# Patient Record
Sex: FEMALE | Race: WHITE | NOT HISPANIC OR LATINO | Employment: FULL TIME | RURAL
[De-identification: names, ages, dates, MRNs, and addresses within clinical notes are randomized per-mention and may not be internally consistent; named-entity substitution may affect disease eponyms.]

---

## 2023-08-24 ENCOUNTER — HOSPITAL ENCOUNTER (EMERGENCY)
Facility: HOSPITAL | Age: 33
Discharge: HOME | End: 2023-08-24
Payer: MEDICAID

## 2023-08-24 VITALS
BODY MASS INDEX: 40.01 KG/M2 | DIASTOLIC BLOOD PRESSURE: 93 MMHG | SYSTOLIC BLOOD PRESSURE: 128 MMHG | TEMPERATURE: 98 F | HEART RATE: 85 BPM | WEIGHT: 225.81 LBS | RESPIRATION RATE: 20 BRPM | HEIGHT: 63 IN | OXYGEN SATURATION: 97 %

## 2023-08-24 DIAGNOSIS — K59.00 CONSTIPATION, UNSPECIFIED CONSTIPATION TYPE: ICD-10-CM

## 2023-08-24 DIAGNOSIS — J02.9 PHARYNGITIS, UNSPECIFIED ETIOLOGY: ICD-10-CM

## 2023-08-24 DIAGNOSIS — B34.9 VIRAL SYNDROME: ICD-10-CM

## 2023-08-24 DIAGNOSIS — G44.209 ACUTE NON INTRACTABLE TENSION-TYPE HEADACHE: Primary | ICD-10-CM

## 2023-08-24 LAB
BILIRUB UR QL STRIP: NEGATIVE
CLARITY UR: CLEAR
COLOR UR: YELLOW
FLUAV AG UPPER RESP QL IA.RAPID: NEGATIVE
FLUBV AG UPPER RESP QL IA.RAPID: NEGATIVE
GLUCOSE UR STRIP-MCNC: NEGATIVE MG/DL
HCG UR QL IA.RAPID: NEGATIVE
KETONES UR STRIP-SCNC: NORMAL MG/DL
LEUKOCYTE ESTERASE UR QL STRIP: NEGATIVE
NITRITE UR QL STRIP: NEGATIVE
PH UR STRIP: 5.5 PH UNITS
PROT UR QL STRIP: NEGATIVE
RBC # UR STRIP: NEGATIVE /UL
SARS-COV-2 RDRP RESP QL NAA+PROBE: NEGATIVE
SP GR UR STRIP: 1.02
UROBILINOGEN UR STRIP-ACNC: 0.2 MG/DL

## 2023-08-24 PROCEDURE — 99284 EMERGENCY DEPT VISIT MOD MDM: CPT

## 2023-08-24 PROCEDURE — 99284 PR EMERGENCY DEPT VISIT,LEVEL IV: ICD-10-PCS | Mod: ,,, | Performed by: NURSE PRACTITIONER

## 2023-08-24 PROCEDURE — 63600175 PHARM REV CODE 636 W HCPCS: Performed by: NURSE PRACTITIONER

## 2023-08-24 PROCEDURE — 96372 THER/PROPH/DIAG INJ SC/IM: CPT | Performed by: NURSE PRACTITIONER

## 2023-08-24 PROCEDURE — 99284 EMERGENCY DEPT VISIT MOD MDM: CPT | Mod: ,,, | Performed by: NURSE PRACTITIONER

## 2023-08-24 PROCEDURE — 87804 INFLUENZA ASSAY W/OPTIC: CPT | Performed by: NURSE PRACTITIONER

## 2023-08-24 PROCEDURE — 87635 SARS-COV-2 COVID-19 AMP PRB: CPT | Performed by: NURSE PRACTITIONER

## 2023-08-24 PROCEDURE — 81003 URINALYSIS AUTO W/O SCOPE: CPT | Performed by: NURSE PRACTITIONER

## 2023-08-24 PROCEDURE — 81025 URINE PREGNANCY TEST: CPT | Performed by: NURSE PRACTITIONER

## 2023-08-24 RX ORDER — BUSPIRONE HYDROCHLORIDE 5 MG/1
5 TABLET ORAL 2 TIMES DAILY
COMMUNITY
End: 2023-10-01 | Stop reason: SDDI

## 2023-08-24 RX ORDER — FLUOXETINE HYDROCHLORIDE 40 MG/1
40 CAPSULE ORAL DAILY
COMMUNITY
End: 2023-10-01 | Stop reason: SDDI

## 2023-08-24 RX ORDER — KETOROLAC TROMETHAMINE 30 MG/ML
15 INJECTION, SOLUTION INTRAMUSCULAR; INTRAVENOUS
Status: COMPLETED | OUTPATIENT
Start: 2023-08-24 | End: 2023-08-24

## 2023-08-24 RX ADMIN — KETOROLAC TROMETHAMINE 15 MG: 30 INJECTION INTRAMUSCULAR; INTRAVENOUS at 09:08

## 2023-08-24 NOTE — DISCHARGE INSTRUCTIONS
Drink more water.  May take miralax or metemucil for constipation.  May use salt water gargles for sore throat.  Take tylenol or ibuprofen as directed if needed for headache.  Return for worsening condition or emergency needs.

## 2023-08-24 NOTE — Clinical Note
"Dalia Claudiomarcellus Collado was seen and treated in our emergency department on 8/24/2023.  She may return to work on 08/25/2023.       If you have any questions or concerns, please don't hesitate to call.      Nan Duarte, FNP"

## 2023-08-24 NOTE — ED TRIAGE NOTES
Patient presents to ED via POV. States she has just been feeling dizzy and weak for the last few days. She is also experiencing an upset stomach and headache. States that today it was just worsening and she didn't feel like she could properly work. VSS.

## 2023-08-24 NOTE — ED PROVIDER NOTES
Encounter Date: 8/24/2023       History     Chief Complaint   Patient presents with    Dizziness    Weakness    Headache     Ms Collado is a pleasant 33 yr old with no significant PMH to ED with c/o headache, sore throat, fever, generalized aching and weakness, dizziness for the past few days.  Reports her friend tested positive for Covid yesterday. Pt also reports constipation at times.  States had BM yesterday but prior to that, it had been a week.      The history is provided by the patient.   Headache   This is a new problem. The current episode started yesterday. The problem has been waxing and waning. The pain is located in the Bilateral region. The quality of the pain is described as aching. Associated symptoms include dizziness, a fever, a sore throat and weakness. Nothing aggravates the symptoms.     Review of patient's allergies indicates:  No Known Allergies  History reviewed. No pertinent past medical history.  History reviewed. No pertinent surgical history.  History reviewed. No pertinent family history.     Review of Systems   Constitutional:  Positive for fatigue and fever.   HENT:  Positive for sore throat.    Respiratory: Negative.     Cardiovascular: Negative.    Genitourinary: Negative.    Skin: Negative.    Neurological:  Positive for dizziness, weakness and headaches.       Physical Exam     Initial Vitals [08/24/23 0805]   BP Pulse Resp Temp SpO2   (!) 128/93 85 20 98.1 °F (36.7 °C) 97 %      MAP       --         Physical Exam    Nursing note and vitals reviewed.  Constitutional: She appears well-developed and well-nourished.   HENT:   Mouth/Throat: Posterior oropharyngeal erythema present.   Neck: Neck supple.   Cardiovascular:  Normal rate and regular rhythm.           Pulmonary/Chest: Breath sounds normal.   Abdominal: Abdomen is soft. There is no abdominal tenderness.   Musculoskeletal:         General: Normal range of motion.      Cervical back: Neck supple.     Neurological: She is  alert and oriented to person, place, and time.   Skin: Skin is warm and dry.         Medical Screening Exam   See Full Note    ED Course   Procedures  Labs Reviewed   RAPID INFLUENZA A/B - Normal   SARS-COV-2 RNA AMPLIFICATION, QUAL - Normal    Narrative:     Negative SARS-CoV results should not be used as the sole basis for treatment or patient management decisions; negative results should be considered in the context of a patient's recent exposures, history and the presene of clinical signs and symptoms consistent with COVID-19.  Negative results should be treated as presumptive and confirmed by molecular assay, if necessary for patient management.   HCG QUALITATIVE URINE - Normal   URINALYSIS          Imaging Results    None          Medications   ketorolac injection 15 mg (15 mg Intramuscular Given 8/24/23 0923)     Medical Decision Making  Ms Collado is a pleasant 33 yr old with no significant PMH to ED with c/o headache, sore throat, fever, generalized aching and weakness, dizziness for the past few days.  Reports her friend tested positive for Covid yesterday. Pt also reports constipation at times.  States had BM yesterday but prior to that, it had been a week.      Amount and/or Complexity of Data Reviewed  Labs: ordered.    Risk  Prescription drug management.                               Clinical Impression:   Final diagnoses:  [G44.209] Acute non intractable tension-type headache (Primary)  [B34.9] Viral syndrome  [J02.9] Pharyngitis, unspecified etiology  [K59.00] Constipation, unspecified constipation type        ED Disposition Condition    Discharge Stable          ED Prescriptions    None       Follow-up Information       Follow up With Specialties Details Why Contact Info    Poonam Hyman FNP Family Medicine Go in 3 days  [address] [facility]  [place] [address]  [phone]               Nan Duarte FNP  08/24/23 8256

## 2023-10-01 ENCOUNTER — HOSPITAL ENCOUNTER (EMERGENCY)
Facility: HOSPITAL | Age: 33
Discharge: HOME | End: 2023-10-01
Payer: MEDICAID

## 2023-10-01 VITALS
WEIGHT: 229 LBS | RESPIRATION RATE: 16 BRPM | HEIGHT: 63 IN | HEART RATE: 69 BPM | DIASTOLIC BLOOD PRESSURE: 85 MMHG | OXYGEN SATURATION: 100 % | BODY MASS INDEX: 40.57 KG/M2 | SYSTOLIC BLOOD PRESSURE: 130 MMHG | TEMPERATURE: 99 F

## 2023-10-01 DIAGNOSIS — S39.012A BACK STRAIN, INITIAL ENCOUNTER: Primary | ICD-10-CM

## 2023-10-01 DIAGNOSIS — N39.0 URINARY TRACT INFECTION WITHOUT HEMATURIA, SITE UNSPECIFIED: ICD-10-CM

## 2023-10-01 LAB
BACTERIA #/AREA URNS HPF: ABNORMAL /HPF
BILIRUB UR QL STRIP: NEGATIVE
CLARITY UR: CLEAR
COLOR UR: YELLOW
GLUCOSE UR STRIP-MCNC: NEGATIVE MG/DL
HCG UR QL IA.RAPID: NEGATIVE
KETONES UR STRIP-SCNC: ABNORMAL MG/DL
LEUKOCYTE ESTERASE UR QL STRIP: ABNORMAL
NITRITE UR QL STRIP: NEGATIVE
PH UR STRIP: 6 PH UNITS
PROT UR QL STRIP: NEGATIVE
RBC # UR STRIP: NEGATIVE /UL
RBC #/AREA URNS HPF: ABNORMAL /HPF
SP GR UR STRIP: 1.02
SQUAMOUS #/AREA URNS LPF: ABNORMAL /LPF
UROBILINOGEN UR STRIP-ACNC: 0.2 MG/DL
WBC #/AREA URNS HPF: ABNORMAL /HPF

## 2023-10-01 PROCEDURE — 81025 URINE PREGNANCY TEST: CPT | Performed by: NURSE PRACTITIONER

## 2023-10-01 PROCEDURE — 63600175 PHARM REV CODE 636 W HCPCS: Performed by: NURSE PRACTITIONER

## 2023-10-01 PROCEDURE — 99284 EMERGENCY DEPT VISIT MOD MDM: CPT | Mod: ,,, | Performed by: NURSE PRACTITIONER

## 2023-10-01 PROCEDURE — 81001 URINALYSIS AUTO W/SCOPE: CPT | Performed by: NURSE PRACTITIONER

## 2023-10-01 PROCEDURE — 99284 PR EMERGENCY DEPT VISIT,LEVEL IV: ICD-10-PCS | Mod: ,,, | Performed by: NURSE PRACTITIONER

## 2023-10-01 PROCEDURE — 96372 THER/PROPH/DIAG INJ SC/IM: CPT | Performed by: NURSE PRACTITIONER

## 2023-10-01 PROCEDURE — 99284 EMERGENCY DEPT VISIT MOD MDM: CPT

## 2023-10-01 RX ORDER — KETOROLAC TROMETHAMINE 30 MG/ML
30 INJECTION, SOLUTION INTRAMUSCULAR; INTRAVENOUS
Status: COMPLETED | OUTPATIENT
Start: 2023-10-01 | End: 2023-10-01

## 2023-10-01 RX ORDER — ORPHENADRINE CITRATE 30 MG/ML
60 INJECTION INTRAMUSCULAR; INTRAVENOUS
Status: COMPLETED | OUTPATIENT
Start: 2023-10-01 | End: 2023-10-01

## 2023-10-01 RX ORDER — CYCLOBENZAPRINE HCL 10 MG
10 TABLET ORAL 3 TIMES DAILY PRN
Qty: 15 TABLET | Refills: 0 | Status: SHIPPED | OUTPATIENT
Start: 2023-10-01 | End: 2023-10-06

## 2023-10-01 RX ORDER — CEPHALEXIN 500 MG/1
500 CAPSULE ORAL EVERY 12 HOURS
Qty: 10 CAPSULE | Refills: 0 | Status: SHIPPED | OUTPATIENT
Start: 2023-10-01 | End: 2023-10-06

## 2023-10-01 RX ORDER — NAPROXEN 250 MG/1
250 TABLET ORAL
Qty: 30 TABLET | Refills: 0 | Status: SHIPPED | OUTPATIENT
Start: 2023-10-01

## 2023-10-01 RX ADMIN — KETOROLAC TROMETHAMINE 30 MG: 30 INJECTION, SOLUTION INTRAMUSCULAR; INTRAVENOUS at 02:10

## 2023-10-01 RX ADMIN — ORPHENADRINE CITRATE 60 MG: 60 INJECTION INTRAMUSCULAR; INTRAVENOUS at 02:10

## 2023-10-01 NOTE — ED TRIAGE NOTES
Pt presents to the ED via POV w/ c/o back pain x 3 days. Pt states she was getting off of toilet and felt a catch in her back and it has been hurting ever since.

## 2023-10-01 NOTE — ED PROVIDER NOTES
Encounter Date: 10/1/2023       History     Chief Complaint   Patient presents with    Back Pain     32 y/o WF presents pov with c/o low back pain x 3 days. Pain began while getting up off the toilet.      Review of patient's allergies indicates:  No Known Allergies  Past Medical History:   Diagnosis Date    Depression      Past Surgical History:   Procedure Laterality Date    TUBAL LIGATION       History reviewed. No pertinent family history.  Social History     Tobacco Use    Smoking status: Never    Smokeless tobacco: Never   Substance Use Topics    Drug use: Never     Review of Systems   Constitutional:  Negative for chills and fever.   Respiratory:  Negative for apnea, cough, choking, chest tightness, shortness of breath, wheezing and stridor.    Cardiovascular:  Negative for chest pain, palpitations and leg swelling.   Genitourinary:  Negative for difficulty urinating, dysuria, flank pain, frequency and hematuria.   All other systems reviewed and are negative.      Physical Exam     Initial Vitals [10/01/23 1357]   BP Pulse Resp Temp SpO2   130/85 69 16 99.4 °F (37.4 °C) 100 %      MAP       --         Physical Exam    Nursing note and vitals reviewed.  Constitutional: She appears well-developed and well-nourished. No distress.   Cardiovascular:  Normal rate, regular rhythm, normal heart sounds and intact distal pulses.     Exam reveals no gallop and no friction rub.       No murmur heard.  Pulmonary/Chest: Breath sounds normal. No respiratory distress. She has no wheezes. She has no rhonchi. She has no rales. She exhibits no tenderness.   Abdominal: Abdomen is soft. Bowel sounds are normal.   Musculoskeletal:      Thoracic back: Spasms and tenderness present. No swelling, signs of trauma or bony tenderness. No scoliosis.     Neurological: She is alert and oriented to person, place, and time. She has normal strength.   Skin: Skin is warm and dry. Capillary refill takes less than 2 seconds.   Psychiatric: She  has a normal mood and affect. Her behavior is normal. Judgment and thought content normal.         Medical Screening Exam   See Full Note    ED Course   Procedures  Labs Reviewed   URINALYSIS, REFLEX TO URINE CULTURE - Abnormal; Notable for the following components:       Result Value    Leukocytes, UA Small (*)     All other components within normal limits   URINALYSIS, MICROSCOPIC - Abnormal; Notable for the following components:    WBC, UA 5-10 (*)     Bacteria, UA Few (*)     Squamous Epithelial Cells, UA Occasional (*)     All other components within normal limits   HCG QUALITATIVE URINE - Normal          Imaging Results    None          Medications   ketorolac injection 30 mg (30 mg Intramuscular Given 10/1/23 1432)   orphenadrine injection 60 mg (60 mg Intramuscular Given 10/1/23 1432)     Medical Decision Making  34 y/o WF presents pov with c/o low back pain x 3 days. Pain began while getting up off the toilet.    Amount and/or Complexity of Data Reviewed  Labs: ordered.     Details: UA:                               Clinical Impression:   Final diagnoses:  [S39.012A] Back strain, initial encounter (Primary)  [N39.0] Urinary tract infection without hematuria, site unspecified        ED Disposition Condition    Discharge Stable          ED Prescriptions       Medication Sig Dispense Start Date End Date Auth. Provider    naproxen (NAPROSYN) 250 MG tablet Take 1 tablet (250 mg total) by mouth every meal as needed (back pain). 30 tablet 10/1/2023 -- Gen Castellon FNP    cyclobenzaprine (FLEXERIL) 10 MG tablet Take 1 tablet (10 mg total) by mouth 3 (three) times daily as needed for Muscle spasms. 15 tablet 10/1/2023 10/6/2023 Gen Castellon FNP    cephALEXin (KEFLEX) 500 MG capsule Take 1 capsule (500 mg total) by mouth every 12 (twelve) hours. for 5 days 10 capsule 10/1/2023 10/6/2023 Gen Castellon FNP          Follow-up Information       Follow up With Specialties Details Why Contact Sophy Hyman  [facility] Go to  If symptoms worsen [address] [facility]  [place] [address]  [phone]               Gen Castellon FNP  10/01/23 6358

## 2024-10-20 ENCOUNTER — HOSPITAL ENCOUNTER (EMERGENCY)
Facility: HOSPITAL | Age: 34
Discharge: ANOTHER HOSPITAL | End: 2024-10-20
Attending: STUDENT IN AN ORGANIZED HEALTH CARE EDUCATION/TRAINING PROGRAM
Payer: MEDICAID

## 2024-10-20 ENCOUNTER — HOSPITAL ENCOUNTER (INPATIENT)
Facility: HOSPITAL | Age: 34
LOS: 2 days | Discharge: HOME | DRG: 123 | End: 2024-10-23
Attending: EMERGENCY MEDICINE | Admitting: STUDENT IN AN ORGANIZED HEALTH CARE EDUCATION/TRAINING PROGRAM
Payer: MEDICAID

## 2024-10-20 VITALS
RESPIRATION RATE: 15 BRPM | OXYGEN SATURATION: 100 % | DIASTOLIC BLOOD PRESSURE: 89 MMHG | HEIGHT: 63 IN | BODY MASS INDEX: 33.66 KG/M2 | WEIGHT: 190 LBS | TEMPERATURE: 98 F | HEART RATE: 83 BPM | SYSTOLIC BLOOD PRESSURE: 131 MMHG

## 2024-10-20 DIAGNOSIS — H46.9 OPTIC NEURITIS: Primary | ICD-10-CM

## 2024-10-20 DIAGNOSIS — H53.47 HEMIANOPSIA: Primary | ICD-10-CM

## 2024-10-20 DIAGNOSIS — R07.9 CHEST PAIN: ICD-10-CM

## 2024-10-20 LAB
ALBUMIN SERPL BCP-MCNC: 3.9 G/DL (ref 3.5–5.2)
ALP SERPL-CCNC: 114 U/L (ref 55–135)
ALT SERPL W/O P-5'-P-CCNC: 16 U/L (ref 10–44)
ANION GAP SERPL CALC-SCNC: 6 MMOL/L (ref 8–16)
AST SERPL-CCNC: 18 U/L (ref 10–40)
B-HCG UR QL: NEGATIVE
BACTERIA #/AREA URNS HPF: NORMAL /HPF
BASOPHILS # BLD AUTO: 0.06 K/UL (ref 0–0.2)
BASOPHILS NFR BLD: 0.7 % (ref 0–1.9)
BILIRUB SERPL-MCNC: 0.7 MG/DL (ref 0.1–1)
BILIRUB UR QL STRIP: NEGATIVE
BUN SERPL-MCNC: 8 MG/DL (ref 6–20)
CALCIUM SERPL-MCNC: 8.9 MG/DL (ref 8.7–10.5)
CHLORIDE SERPL-SCNC: 104 MMOL/L (ref 95–110)
CLARITY UR: CLEAR
CO2 SERPL-SCNC: 28 MMOL/L (ref 23–29)
COLOR UR: YELLOW
CREAT SERPL-MCNC: 0.7 MG/DL (ref 0.5–1.4)
CREAT SERPL-MCNC: 0.8 MG/DL (ref 0.5–1.4)
CTP QC/QA: YES
DIFFERENTIAL METHOD BLD: NORMAL
EOSINOPHIL # BLD AUTO: 0.2 K/UL (ref 0–0.5)
EOSINOPHIL NFR BLD: 2.1 % (ref 0–8)
ERYTHROCYTE [DISTWIDTH] IN BLOOD BY AUTOMATED COUNT: 13 % (ref 11.5–14.5)
EST. GFR  (NO RACE VARIABLE): >60 ML/MIN/1.73 M^2
GLUCOSE SERPL-MCNC: 91 MG/DL (ref 70–110)
GLUCOSE UR QL STRIP: NEGATIVE
HCT VFR BLD AUTO: 42.1 % (ref 37–48.5)
HCV AB SERPL QL IA: NORMAL
HGB BLD-MCNC: 14.2 G/DL (ref 12–16)
HGB UR QL STRIP: NEGATIVE
HIV 1+2 AB+HIV1 P24 AG SERPL QL IA: NORMAL
HYALINE CASTS #/AREA URNS LPF: 0 /LPF
IMM GRANULOCYTES # BLD AUTO: 0.03 K/UL (ref 0–0.04)
IMM GRANULOCYTES NFR BLD AUTO: 0.3 % (ref 0–0.5)
KETONES UR QL STRIP: NEGATIVE
LEUKOCYTE ESTERASE UR QL STRIP: NEGATIVE
LYMPHOCYTES # BLD AUTO: 2.2 K/UL (ref 1–4.8)
LYMPHOCYTES NFR BLD: 24.5 % (ref 18–48)
MAGNESIUM SERPL-MCNC: 1.9 MG/DL (ref 1.6–2.6)
MCH RBC QN AUTO: 30.1 PG (ref 27–31)
MCHC RBC AUTO-ENTMCNC: 33.7 G/DL (ref 32–36)
MCV RBC AUTO: 89 FL (ref 82–98)
MICROSCOPIC COMMENT: NORMAL
MONOCYTES # BLD AUTO: 0.8 K/UL (ref 0.3–1)
MONOCYTES NFR BLD: 8.7 % (ref 4–15)
NEUTROPHILS # BLD AUTO: 5.8 K/UL (ref 1.8–7.7)
NEUTROPHILS NFR BLD: 63.7 % (ref 38–73)
NITRITE UR QL STRIP: NEGATIVE
NRBC BLD-RTO: 0 /100 WBC
PH UR STRIP: 7 [PH] (ref 5–8)
PLATELET # BLD AUTO: 310 K/UL (ref 150–450)
PMV BLD AUTO: 9.7 FL (ref 9.2–12.9)
POTASSIUM SERPL-SCNC: 4.6 MMOL/L (ref 3.5–5.1)
PROT SERPL-MCNC: 7 G/DL (ref 6–8.4)
PROT UR QL STRIP: ABNORMAL
RBC # BLD AUTO: 4.72 M/UL (ref 4–5.4)
RBC #/AREA URNS HPF: 3 /HPF (ref 0–4)
SAMPLE: NORMAL
SODIUM SERPL-SCNC: 138 MMOL/L (ref 136–145)
SP GR UR STRIP: >1.03 (ref 1–1.03)
SQUAMOUS #/AREA URNS HPF: 3 /HPF
TSH SERPL DL<=0.005 MIU/L-ACNC: 2.19 UIU/ML (ref 0.34–5.6)
URN SPEC COLLECT METH UR: ABNORMAL
UROBILINOGEN UR STRIP-ACNC: NEGATIVE EU/DL
WBC # BLD AUTO: 9.1 K/UL (ref 3.9–12.7)
WBC #/AREA URNS HPF: 0 /HPF (ref 0–5)

## 2024-10-20 PROCEDURE — 80053 COMPREHEN METABOLIC PANEL: CPT | Performed by: NURSE PRACTITIONER

## 2024-10-20 PROCEDURE — 94761 N-INVAS EAR/PLS OXIMETRY MLT: CPT

## 2024-10-20 PROCEDURE — 25500020 PHARM REV CODE 255: Performed by: STUDENT IN AN ORGANIZED HEALTH CARE EDUCATION/TRAINING PROGRAM

## 2024-10-20 PROCEDURE — 84443 ASSAY THYROID STIM HORMONE: CPT | Performed by: NURSE PRACTITIONER

## 2024-10-20 PROCEDURE — 93005 ELECTROCARDIOGRAM TRACING: CPT

## 2024-10-20 PROCEDURE — 87389 HIV-1 AG W/HIV-1&-2 AB AG IA: CPT | Performed by: PHYSICIAN ASSISTANT

## 2024-10-20 PROCEDURE — A9585 GADOBUTROL INJECTION: HCPCS | Performed by: STUDENT IN AN ORGANIZED HEALTH CARE EDUCATION/TRAINING PROGRAM

## 2024-10-20 PROCEDURE — 99285 EMERGENCY DEPT VISIT HI MDM: CPT | Mod: 25

## 2024-10-20 PROCEDURE — 63600175 PHARM REV CODE 636 W HCPCS: Performed by: PHYSICIAN ASSISTANT

## 2024-10-20 PROCEDURE — 96375 TX/PRO/DX INJ NEW DRUG ADDON: CPT

## 2024-10-20 PROCEDURE — 93010 ELECTROCARDIOGRAM REPORT: CPT | Mod: ,,, | Performed by: INTERNAL MEDICINE

## 2024-10-20 PROCEDURE — 86363 MOG-IGG1 ANTB FLO CYTMTRY EA: CPT

## 2024-10-20 PROCEDURE — 63600175 PHARM REV CODE 636 W HCPCS

## 2024-10-20 PROCEDURE — 81025 URINE PREGNANCY TEST: CPT | Performed by: NURSE PRACTITIONER

## 2024-10-20 PROCEDURE — G0378 HOSPITAL OBSERVATION PER HR: HCPCS

## 2024-10-20 PROCEDURE — 86803 HEPATITIS C AB TEST: CPT | Performed by: PHYSICIAN ASSISTANT

## 2024-10-20 PROCEDURE — 81001 URINALYSIS AUTO W/SCOPE: CPT | Performed by: NURSE PRACTITIONER

## 2024-10-20 PROCEDURE — 25000003 PHARM REV CODE 250

## 2024-10-20 PROCEDURE — 96365 THER/PROPH/DIAG IV INF INIT: CPT

## 2024-10-20 PROCEDURE — 85025 COMPLETE CBC W/AUTO DIFF WBC: CPT | Performed by: NURSE PRACTITIONER

## 2024-10-20 PROCEDURE — 82565 ASSAY OF CREATININE: CPT

## 2024-10-20 PROCEDURE — 83735 ASSAY OF MAGNESIUM: CPT | Performed by: NURSE PRACTITIONER

## 2024-10-20 RX ORDER — TALC
6 POWDER (GRAM) TOPICAL NIGHTLY PRN
Status: DISCONTINUED | OUTPATIENT
Start: 2024-10-20 | End: 2024-10-23 | Stop reason: HOSPADM

## 2024-10-20 RX ORDER — ASPIRIN 325 MG
50000 TABLET, DELAYED RELEASE (ENTERIC COATED) ORAL
COMMUNITY
Start: 2024-07-01

## 2024-10-20 RX ORDER — ACETAMINOPHEN 325 MG/1
650 TABLET ORAL EVERY 4 HOURS PRN
Status: DISCONTINUED | OUTPATIENT
Start: 2024-10-20 | End: 2024-10-23 | Stop reason: HOSPADM

## 2024-10-20 RX ORDER — BISACODYL 10 MG/1
10 SUPPOSITORY RECTAL DAILY PRN
Status: DISCONTINUED | OUTPATIENT
Start: 2024-10-20 | End: 2024-10-23 | Stop reason: HOSPADM

## 2024-10-20 RX ORDER — POLYETHYLENE GLYCOL 3350 17 G/17G
17 POWDER, FOR SOLUTION ORAL DAILY PRN
Status: DISCONTINUED | OUTPATIENT
Start: 2024-10-20 | End: 2024-10-23 | Stop reason: HOSPADM

## 2024-10-20 RX ORDER — KETOROLAC TROMETHAMINE 30 MG/ML
10 INJECTION, SOLUTION INTRAMUSCULAR; INTRAVENOUS
Status: COMPLETED | OUTPATIENT
Start: 2024-10-20 | End: 2024-10-20

## 2024-10-20 RX ORDER — ENOXAPARIN SODIUM 100 MG/ML
40 INJECTION SUBCUTANEOUS EVERY 24 HOURS
Status: DISCONTINUED | OUTPATIENT
Start: 2024-10-21 | End: 2024-10-23 | Stop reason: HOSPADM

## 2024-10-20 RX ORDER — IPRATROPIUM BROMIDE AND ALBUTEROL SULFATE 2.5; .5 MG/3ML; MG/3ML
3 SOLUTION RESPIRATORY (INHALATION) EVERY 4 HOURS PRN
Status: DISCONTINUED | OUTPATIENT
Start: 2024-10-20 | End: 2024-10-23 | Stop reason: HOSPADM

## 2024-10-20 RX ORDER — GLUCAGON 1 MG
1 KIT INJECTION
Status: DISCONTINUED | OUTPATIENT
Start: 2024-10-20 | End: 2024-10-23 | Stop reason: HOSPADM

## 2024-10-20 RX ORDER — GADOBUTROL 604.72 MG/ML
8 INJECTION INTRAVENOUS
Status: COMPLETED | OUTPATIENT
Start: 2024-10-20 | End: 2024-10-20

## 2024-10-20 RX ORDER — IBUPROFEN 200 MG
16 TABLET ORAL
Status: DISCONTINUED | OUTPATIENT
Start: 2024-10-20 | End: 2024-10-23 | Stop reason: HOSPADM

## 2024-10-20 RX ORDER — IBUPROFEN 200 MG
24 TABLET ORAL
Status: DISCONTINUED | OUTPATIENT
Start: 2024-10-20 | End: 2024-10-23 | Stop reason: HOSPADM

## 2024-10-20 RX ADMIN — ACYCLOVIR SODIUM 520 MG: 50 INJECTION, SOLUTION INTRAVENOUS at 10:10

## 2024-10-20 RX ADMIN — IOHEXOL 100 ML: 350 INJECTION, SOLUTION INTRAVENOUS at 04:10

## 2024-10-20 RX ADMIN — GADOBUTROL 8 ML: 604.72 INJECTION INTRAVENOUS at 06:10

## 2024-10-20 RX ADMIN — KETOROLAC TROMETHAMINE 10 MG: 30 INJECTION, SOLUTION INTRAMUSCULAR; INTRAVENOUS at 09:10

## 2024-10-20 NOTE — Clinical Note
Diagnosis: Optic neuritis [420954]   Future Attending Provider: DEA MARTINO [[id number]]   Is the patient being sent to ED Observation?: No

## 2024-10-20 NOTE — ED PROVIDER NOTES
Encounter Date: 10/20/2024       History     Chief Complaint   Patient presents with    Eye Problem     Pt believes she has a retina detachment. Her vision had went dark and having trouble for the last 5 days.      34-year-old female presents emergency department for evaluation of visual disturbance.  The patient has a 5 day history of seeing floaters and what is described as a dark curtain over part of her visual field in the left eye.  She complains of sharp pressure that is severe in nature.  It is worse with exposure to bright light and with eye movements.  She is also complaining of pain behind her eye.  She denies anything improve his symptoms.  Denies taking any medications treat symptoms.  Denies any other symptoms review of systems.  Patient has no allergies.  Patient states last menstrual cycle was earlier this month.    The history is provided by the patient. No  was used.     Review of patient's allergies indicates:  No Known Allergies  Past Medical History:   Diagnosis Date    Depression      Past Surgical History:   Procedure Laterality Date    TUBAL LIGATION       No family history on file.  Social History     Tobacco Use    Smoking status: Never    Smokeless tobacco: Never   Substance Use Topics    Drug use: Never     Review of Systems   Constitutional:  Negative for chills and fever.   HENT:  Negative for rhinorrhea and sore throat.    Eyes:  Positive for visual disturbance.   Respiratory:  Negative for cough and shortness of breath.    Cardiovascular:  Negative for chest pain.   Gastrointestinal:  Negative for abdominal pain, nausea and vomiting.   Musculoskeletal:  Negative for back pain and neck pain.   Skin:  Negative for rash.   Neurological:  Negative for dizziness, weakness and headaches.   All other systems reviewed and are negative.      Physical Exam     Initial Vitals [10/20/24 1423]   BP Pulse Resp Temp SpO2   (!) 123/95 78 17 98.1 °F (36.7 °C) 99 %      MAP       --          Physical Exam    Nursing note and vitals reviewed.  Constitutional: She appears well-developed and well-nourished. She is not diaphoretic. No distress.   HENT:   Head: Normocephalic and atraumatic.   Nose: Nose normal.   Eyes: Conjunctivae, EOM and lids are normal. Pupils are equal, round, and reactive to light. Right eye exhibits no discharge. Left eye exhibits no discharge. Right eye exhibits normal extraocular motion. Left eye exhibits normal extraocular motion.   Neck: Neck supple.   Normal range of motion.  Cardiovascular:  Normal rate, regular rhythm, normal heart sounds and intact distal pulses.           Pulmonary/Chest: Breath sounds normal. No respiratory distress.   Abdominal: Abdomen is soft. Bowel sounds are normal. There is no abdominal tenderness.   Musculoskeletal:         General: Normal range of motion.      Cervical back: Normal range of motion and neck supple.     Neurological: She is alert and oriented to person, place, and time. She has normal strength. No sensory deficit.   Skin: Skin is warm and dry.   Psychiatric: She has a normal mood and affect. Thought content normal.         ED Course   Procedures  Labs Reviewed   CBC W/ AUTO DIFFERENTIAL       Result Value    WBC 9.10      RBC 4.72      Hemoglobin 14.2      Hematocrit 42.1      MCV 89      MCH 30.1      MCHC 33.7      RDW 13.0      Platelets 310      MPV 9.7      Immature Granulocytes 0.3      Gran # (ANC) 5.8      Immature Grans (Abs) 0.03      Lymph # 2.2      Mono # 0.8      Eos # 0.2      Baso # 0.06      nRBC 0      Gran % 63.7      Lymph % 24.5      Mono % 8.7      Eosinophil % 2.1      Basophil % 0.7      Differential Method Automated     COMPREHENSIVE METABOLIC PANEL   URINALYSIS, REFLEX TO URINE CULTURE   MAGNESIUM   TSH   POCT URINE PREGNANCY    POC Preg Test, Ur Negative       Acceptable Yes     ISTAT CREATININE    POC Creatinine 0.8      Sample VENOUS     POCT CREATININE          Imaging Results               CTA Brain (Final result)  Result time 10/20/24 16:23:36      Final result by Sonny Cruz MD (10/20/24 16:23:36)                   Impression:      No acute abnormality. No high-grade stenosis or major vessel occlusion.      Electronically signed by: Sonny Cruz MD  Date:    10/20/2024  Time:    16:23               Narrative:    EXAMINATION:  CTA HEAD    CLINICAL HISTORY:  visual disturbance;    TECHNIQUE:  CT angiogram was performed from the level of the bottom of C2 to the top of the head following the IV administration of 100 mL of Omnipaque 350.   Sagittal and coronal  maximum intensity projection reconstructions were performed.    COMPARISON:  None    FINDINGS:  Intracranial Compartment:    Limited intracranial views demonstrate no evidence of hydrocephalus or midline shift.  The sellar region appear grossly within normal limits.    Skull/Extracranial Contents (limited evaluation): No fracture. There is trace opacification of the left sphenoid sinus.  The remaining paranasal sinuses and mastoid air cells are relatively well aerated.    Intracranial Arteries: The bilateral intracranial ICAs are patent without evidence of high-grade stenosis or occlusion.  The bilateral MCA's and DONELL's are patent without evidence of high-grade stenosis or proximal large vessel occlusion.    The bilateral distal vertebral arteries and basilar artery are patent.  The bilateral PCA's appear patent without evidence of high-grade stenosis or proximal large vessel occlusion.    Venous structures (limited evaluation): The visualized major dural venous sinuses are patent.  No evidence of vascular malformation.                                       Medications   iohexoL (OMNIPAQUE 350) injection 100 mL (100 mLs Intravenous Given 10/20/24 1610)     Medical Decision Making  I have reviewed the patient's diagnostic workup done here in the emergency department and discussed the case with the ED attending physician.  The ED  attending physician performed a bedside ultrasound of the patient to rule out a retinal detachment as the cause of her symptoms.  The patient maintains normal visual acuity but states that there is a dark curtain over the lateral portion of the left eye only.  There is no acute finding noted on the patient's head CT.  We contacted the on-call neurologist, Dr. Ham Martínez, who recommended an MRI of the brain and admission.  He has also requested that the patient be evaluated within 24 hours by Ophthalmology.  Hospital medicine team has been contacted, and after speaking to the hospital medicine team, we were informed that ophthalmology does not see patient is in-house.  At that point it was recommended that the patient be transferred to another facility for inpatient evaluation by ophthalmology and neurology.    Amount and/or Complexity of Data Reviewed  Labs: ordered.  Radiology: ordered. Decision-making details documented in ED Course.    Risk  Prescription drug management.  Decision regarding hospitalization.               ED Course as of 10/20/24 1750   Sun Oct 20, 2024   1530 Bedside ultrasound performed demonstrates anterior and posterior lens intact, pupils reactive, no vitreous hemorrhage, no vitreous detachment, no retinal detachment, cranial nerves slightly enlarged to 56mm otherwise no structural abnormalities noted on exam.  Based on the sudden change in vision as well as pain behind the eye we will order CT angio of the head to further evaluate. [CH]   7111 CTA Brain  Limited intracranial views demonstrate no evidence of hydrocephalus or midline shift.  The sellar region appear grossly within normal limits.     Skull/Extracranial Contents (limited evaluation): No fracture. There is trace opacification of the left sphenoid sinus.  The remaining paranasal sinuses and mastoid air cells are relatively well aerated.     Intracranial Arteries: The bilateral intracranial ICAs are patent without evidence of  high-grade stenosis or occlusion.  The bilateral MCA's and DONELL's are patent without evidence of high-grade stenosis or proximal large vessel occlusion.     The bilateral distal vertebral arteries and basilar artery are patent.  The bilateral PCA's appear patent without evidence of high-grade stenosis or proximal large vessel occlusion.     Venous structures (limited evaluation): The visualized major dural venous sinuses are patent.  No evidence of vascular malformation.     Impression:     No acute abnormality. No high-grade stenosis or major vessel occlusion.      [CH]   1748 I have spoken with  at [facility].  He agrees that the patient needs to be transferred for ophthalmology and neurology evaluations.  Transfer center is coordinating. [LG]      ED Course User Index  [CH] Garrett Rondon MD  [LG] Thierry Rojas III, NP                             Clinical Impression:  Final diagnoses:  [H53.47] Hemianopsia (Primary)          ED Disposition Condition    Transfer to Another Facility Stable                Thierry Rojas III, NP  10/20/24 1716       Thierry Rojas III, NP  10/20/24 1733       Thierry Rojas III, NP  10/20/24 1756

## 2024-10-21 PROBLEM — E83.39 HYPOPHOSPHATEMIA: Status: ACTIVE | Noted: 2024-10-21

## 2024-10-21 LAB
ANION GAP SERPL CALC-SCNC: 12 MMOL/L (ref 8–16)
BASOPHILS # BLD AUTO: 0.04 K/UL (ref 0–0.2)
BASOPHILS NFR BLD: 0.4 % (ref 0–1.9)
BUN SERPL-MCNC: 9 MG/DL (ref 6–20)
CALCIUM SERPL-MCNC: 8.9 MG/DL (ref 8.7–10.5)
CHLORIDE SERPL-SCNC: 106 MMOL/L (ref 95–110)
CO2 SERPL-SCNC: 20 MMOL/L (ref 23–29)
CREAT SERPL-MCNC: 0.8 MG/DL (ref 0.5–1.4)
DIFFERENTIAL METHOD BLD: ABNORMAL
EOSINOPHIL # BLD AUTO: 0 K/UL (ref 0–0.5)
EOSINOPHIL NFR BLD: 0.3 % (ref 0–8)
ERYTHROCYTE [DISTWIDTH] IN BLOOD BY AUTOMATED COUNT: 12.9 % (ref 11.5–14.5)
EST. GFR  (NO RACE VARIABLE): >60 ML/MIN/1.73 M^2
GLUCOSE SERPL-MCNC: 177 MG/DL (ref 70–110)
HCT VFR BLD AUTO: 40.4 % (ref 37–48.5)
HGB BLD-MCNC: 13.5 G/DL (ref 12–16)
IMM GRANULOCYTES # BLD AUTO: 0.04 K/UL (ref 0–0.04)
IMM GRANULOCYTES NFR BLD AUTO: 0.4 % (ref 0–0.5)
LYMPHOCYTES # BLD AUTO: 1.1 K/UL (ref 1–4.8)
LYMPHOCYTES NFR BLD: 10.6 % (ref 18–48)
MAGNESIUM SERPL-MCNC: 1.9 MG/DL (ref 1.6–2.6)
MCH RBC QN AUTO: 29.5 PG (ref 27–31)
MCHC RBC AUTO-ENTMCNC: 33.4 G/DL (ref 32–36)
MCV RBC AUTO: 88 FL (ref 82–98)
MONOCYTES # BLD AUTO: 0.1 K/UL (ref 0.3–1)
MONOCYTES NFR BLD: 1.3 % (ref 4–15)
NEUTROPHILS # BLD AUTO: 8.9 K/UL (ref 1.8–7.7)
NEUTROPHILS NFR BLD: 87 % (ref 38–73)
NRBC BLD-RTO: 0 /100 WBC
OHS QRS DURATION: 96 MS
OHS QTC CALCULATION: 449 MS
PHOSPHATE SERPL-MCNC: 1.6 MG/DL (ref 2.7–4.5)
PLATELET # BLD AUTO: 292 K/UL (ref 150–450)
PMV BLD AUTO: 9.6 FL (ref 9.2–12.9)
POTASSIUM SERPL-SCNC: 3.6 MMOL/L (ref 3.5–5.1)
RBC # BLD AUTO: 4.58 M/UL (ref 4–5.4)
SODIUM SERPL-SCNC: 138 MMOL/L (ref 136–145)
WBC # BLD AUTO: 10.22 K/UL (ref 3.9–12.7)

## 2024-10-21 PROCEDURE — 84100 ASSAY OF PHOSPHORUS: CPT | Performed by: PHYSICIAN ASSISTANT

## 2024-10-21 PROCEDURE — 11000001 HC ACUTE MED/SURG PRIVATE ROOM

## 2024-10-21 PROCEDURE — 25000003 PHARM REV CODE 250

## 2024-10-21 PROCEDURE — 96367 TX/PROPH/DG ADDL SEQ IV INF: CPT

## 2024-10-21 PROCEDURE — 83735 ASSAY OF MAGNESIUM: CPT | Performed by: PHYSICIAN ASSISTANT

## 2024-10-21 PROCEDURE — 85025 COMPLETE CBC W/AUTO DIFF WBC: CPT | Performed by: PHYSICIAN ASSISTANT

## 2024-10-21 PROCEDURE — 99223 1ST HOSP IP/OBS HIGH 75: CPT | Mod: ,,, | Performed by: STUDENT IN AN ORGANIZED HEALTH CARE EDUCATION/TRAINING PROGRAM

## 2024-10-21 PROCEDURE — 63600175 PHARM REV CODE 636 W HCPCS

## 2024-10-21 PROCEDURE — 63600175 PHARM REV CODE 636 W HCPCS: Performed by: PHYSICIAN ASSISTANT

## 2024-10-21 PROCEDURE — 21400001 HC TELEMETRY ROOM

## 2024-10-21 PROCEDURE — 80048 BASIC METABOLIC PNL TOTAL CA: CPT | Performed by: PHYSICIAN ASSISTANT

## 2024-10-21 RX ORDER — SODIUM,POTASSIUM PHOSPHATES 280-250MG
2 POWDER IN PACKET (EA) ORAL
Status: CANCELLED | OUTPATIENT
Start: 2024-10-21 | End: 2024-10-22

## 2024-10-21 RX ORDER — DIPHENHYDRAMINE HYDROCHLORIDE 50 MG/ML
12.5 INJECTION INTRAMUSCULAR; INTRAVENOUS ONCE
Status: COMPLETED | OUTPATIENT
Start: 2024-10-21 | End: 2024-10-21

## 2024-10-21 RX ADMIN — METHYLPREDNISOLONE SODIUM SUCCINATE 1000 MG: 1 INJECTION INTRAMUSCULAR; INTRAVENOUS at 12:10

## 2024-10-21 RX ADMIN — ACYCLOVIR SODIUM 520 MG: 50 INJECTION, SOLUTION INTRAVENOUS at 11:10

## 2024-10-21 RX ADMIN — ACYCLOVIR SODIUM 520 MG: 50 INJECTION, SOLUTION INTRAVENOUS at 04:10

## 2024-10-21 RX ADMIN — DIPHENHYDRAMINE HYDROCHLORIDE 12.5 MG: 50 INJECTION, SOLUTION INTRAMUSCULAR; INTRAVENOUS at 08:10

## 2024-10-21 RX ADMIN — ACYCLOVIR SODIUM 520 MG: 50 INJECTION, SOLUTION INTRAVENOUS at 07:10

## 2024-10-21 RX ADMIN — ENOXAPARIN SODIUM 40 MG: 40 INJECTION SUBCUTANEOUS at 05:10

## 2024-10-21 NOTE — ED NOTES
Assumed care for pt after recieving report from CHAIM Jones. Pt. resting in bed in NAD, RR e/u. Vital signs stable and within desired limits at this time of assessment. Pt. offered bathroom assistance and denies need at this time. Explanation of care/wait provided. Pt verbalizes no needs at this time. Bed in low, locked position with rails up and call bell in reach. Pt's white board updated with today's care team and plan.

## 2024-10-21 NOTE — ASSESSMENT & PLAN NOTE
- presents with L eye pain x5 days and and dark shadow of L eye x1 day  - MRI with findings as detailed above  - opthalmology consulted; appreciate recs  - neuro consulted; recs below:   --start IVMP 1g daily x5 days  --start empiric acyclovir dosed for CNS coverage for now  --check serum CNS demyelinating disease panel (this includes both NMO and MOG- no need to order these tests separately)  --will obtain LP as next diagnostic measure  - neuro checks q4h

## 2024-10-21 NOTE — ASSESSMENT & PLAN NOTE
Dalia Collado is a 34 year old female who presents for optic neuritis. She has pain localized in the left eye that is exacerbated when looking to the left and minimal left nasal visual field defect. MRI with L optic neuritic and R FLAIR temporal hyperintensity that could be incidental vs. Inflammatory process; case reviewed with radiology. Recommend additional work up to rule out other possible demyelinating lesions and to perform LP after MRI for CSF studies.     Recommendations:  - Continue IV solumedrol x 5 days  - IV acyclovir  - Obtain MRI brain cervical/thoracic demyelinating w/wo contrast  - Will Perform LP after MRI  - Ordered CSF studies and added meningitis-encephalitis panel and additional serum encephalopathy panel  - Consider spot EEG after above work up to rule out focal brain wave abnormality in the right temporal lobe  - Will continue to follow

## 2024-10-21 NOTE — CARE UPDATE
I have reviewed the chart of Dalia Collado who is hospitalized for the following:    Active Hospital Problems    Diagnosis    *Optic neuritis    Hypophosphatemia     POA, Ph 1.6  Replete as needed and needs daily chemistry           Diogenes Calero PA-C  Unit Based [facility]

## 2024-10-21 NOTE — CONSULTS
"Consultation Report  Ophthalmology Service    Date: 10/20/2024    Reason for Consult: "visual disturbance.  Findings concerning for optic neuritis on MRI.  Transfer from [place]"     History of Present Illness: Dalia Collado is a 34 y.o. female with no past ocular history who was transferred from [facility] to [facility] for higher level of care. Symptoms started with pain with eye movements ~5 days ago and has been getting worse. She went tot [facility] because it was getting worse. MRI at OSH found signs of optic neuritis and patient was transferred for further care. Ophthalmology is being consulted to evaluate for optic neuritis. Patient denies any changes in vision, flashes, floaters, or curtain-veil in visual field ou. Has pain with eye movements.     POcularHx: Denies history of ocular problems or past ocular surgeries.    Current eye gtts: Denies     Family Hx: Denies family history of glaucoma, macular degeneration, or blindness. family history is not on file.     PMHx:  has a past medical history of Depression.     PSurgHx:  has a past surgical history that includes Tubal ligation.     Home Medications:   Prior to Admission medications    Medication Sig Start Date End Date Taking? Authorizing Provider   cholecalciferol, vitamin D3, 1,250 mcg (50,000 unit) capsule Take 50,000 Units by mouth every 7 days. 7/1/24   Provider, Historical   naproxen (NAPROSYN) 250 MG tablet Take 1 tablet (250 mg total) by mouth every meal as needed (back pain). 10/1/23   Gen Castellon FNP        Medications this encounter:    ketorolac  9.999 mg Intravenous ED 1 Time       Allergies: has No Known Allergies.     Social:  reports that she has never smoked. She has never used smokeless tobacco. She reports that she does not use drugs.     ROS: As per HPI    Ocular examination/Dilated fundus examination:  Base Eye Exam       Visual Acuity (Snellen - Linear)         Right Left    Dist sc 20/20 20/20              Tonometry " "(Tonopen, 9:16 PM)         Right Left    Pressure 15 14              Pupils         Dark Light Shape React APD    Right 5 3 Round Brisk None    Left 5 5 Round Minimal +3              Visual Fields (Counting fingers)         Right Left     Full Full              Extraocular Movement         Right Left     Full, Ortho Full, Ortho              Neuro/Psych       Oriented x3: Yes    Mood/Affect: Normal              Dilation       Both eyes: 1% Mydriacyl, 2.5% Phenylephrine @ 9:16 PM                  Slit Lamp and Fundus Exam       External Exam         Right Left    External Normal Normal              Slit Lamp Exam         Right Left    Lids/Lashes Normal Normal    Conjunctiva/Sclera Trace Injection Trace Injection    Cornea Clear Clear    Anterior Chamber Deep and quiet Deep and quiet    Iris Round and reactive Round and reactive    Lens Clear Clear    Anterior Vitreous Normal Normal              Fundus Exam         Right Left    Disc Pink and sharp Grade 3 ONH edema    C/D Ratio 0.55     Macula flat flat    Vessels Normal caliber and crossing Normal caliber and crossing    Periphery Flat 360, no H/T/D Flat 360, no H/T/D                      Assessment/Plan:     # Optic Neuritis, Left Eye  # Unilateral optic nerve head edema, left eye  - Patient presents with eye pain worse with eye movement of the affected eye. Also complains of dark shadow in the left eye.  - Base exam with excellent vision but main complain is pain with EOM. IOP 15 // 14. APD in the left eye.  - Color plates 12/12 right eye and 12/12 left eye, red desaturation is present and ~10% of color is lost ("lighter") in the left eye  - Confrontational fields reveals grossly full visual fields.  - Anterior segment exam without remarkable findings.  - Posterior segment exam with grade 3 ONH edema left eye  - Since 2/3 of optic neuritis presentations are retrobulbar, it is not unusual to have normal disc exam findings in optic neuritis cases.  - MRI brain and " orbits with T2 signal hyperintensity of the left optic nerve  - Recommend consulting Neurology or possible admission for IV steroids and workup to rule out NMO/MOG (as IV steroids affects final vision potential in such patients).      Patient's Best Contact Number: [phone]    Puma Soliman MD   [facility] Ophthalmology PGY2  10/20/2024  9:18 PM        Common Ophthalmologic Abbreviations  OD: right eye  OS: left eye  OU: both eyes  IOP: intraocular pressure  VA: visual acuity  PH: pinhole  HM: hand motion  LP: light perception  NLP: no light perception  DFE: dilated fundus examination  SLE: slit lamp examination  RD: retinal detachment   AT: artificial tears  PFAT: preservative free artificial tears

## 2024-10-21 NOTE — PLAN OF CARE
Chart reviewed and case discussed with attending.    Briefly, patient is a 34 year old female who presents with acute onset visual disturbances and pain involving the L eye. CTA was completed which shows no significant vascular abnormalities. MRI Brain completed which shows enhancement of the L optic nerve and Flair hyperintensity in the R temporal lobe.     Findings in the L optic nerve are consistent with optic neuritis.    Unclear at this time what the findings in the R temporal lobe represent. Would be an atypical location to represent a prior demyelinating event.    Recommendations:  -start IVMP 1g daily x5 days  -start empiric acyclovir dosed for CNS coverage for now  -check serum CNS demyelinating disease panel (this includes both NMO and MOG- no need to order these tests separately)  -will obtain LP as next diagnostic measure    Full consult note to follow on 10/21

## 2024-10-21 NOTE — PROGRESS NOTES
Patient was transferred to [facility].  Please call and ensure that they saw and followed up on MRI results

## 2024-10-21 NOTE — PLAN OF CARE
Problem: Adult Inpatient Plan of Care  Goal: Plan of Care Review  Outcome: Progressing  Goal: Patient-Specific Goal (Individualized)  Outcome: Progressing  Goal: Absence of Hospital-Acquired Illness or Injury  Outcome: Progressing  Goal: Optimal Comfort and Wellbeing  Outcome: Progressing  Goal: Readiness for Transition of Care  Outcome: Progressing     Problem: Infection  Goal: Absence of Infection Signs and Symptoms  Outcome: Progressing     Problem: Bariatric Environmental Safety  Goal: Safety Maintained with Care  Outcome: Progressing

## 2024-10-21 NOTE — SUBJECTIVE & OBJECTIVE
Interval History: Patient admitted to hospital medicine. Has ongoing eye pain with left lateral. Vision unchanged from yesterday reports still has dark curtain over vision    Review of Systems  Objective:     Vital Signs (Most Recent):  Temp: 98.7 °F (37.1 °C) (10/21/24 1122)  Pulse: 106 (10/21/24 1122)  Resp: 17 (10/21/24 1122)  BP: 126/84 (10/21/24 1122)  SpO2: 96 % (10/21/24 1122) Vital Signs (24h Range):  Temp:  [98.1 °F (36.7 °C)-98.8 °F (37.1 °C)] 98.7 °F (37.1 °C)  Pulse:  [] 106  Resp:  [14-18] 17  SpO2:  [95 %-100 %] 96 %  BP: (103-131)/(73-95) 126/84     Weight: 110.2 kg (242 lb 15.2 oz)  Body mass index is 43.04 kg/m².    Intake/Output Summary (Last 24 hours) at 10/21/2024 1331  Last data filed at 10/21/2024 1007  Gross per 24 hour   Intake 198.08 ml   Output --   Net 198.08 ml         Physical Exam  Vitals and nursing note reviewed.   Constitutional:       General: She is not in acute distress.     Appearance: She is well-developed. She is obese.   HENT:      Head: Normocephalic and atraumatic.      Mouth/Throat:      Pharynx: No oropharyngeal exudate.   Eyes:      General: No scleral icterus.     Conjunctiva/sclera: Conjunctivae normal.      Comments: Pain with eye movement   Cardiovascular:      Rate and Rhythm: Normal rate and regular rhythm.      Heart sounds: Normal heart sounds.   Pulmonary:      Effort: Pulmonary effort is normal. No respiratory distress.      Breath sounds: Normal breath sounds. No wheezing.   Abdominal:      General: Bowel sounds are normal. There is no distension.      Palpations: Abdomen is soft.      Tenderness: There is no abdominal tenderness.   Musculoskeletal:         General: No tenderness. Normal range of motion.      Cervical back: Normal range of motion and neck supple.   Lymphadenopathy:      Cervical: No cervical adenopathy.   Skin:     General: Skin is warm and dry.      Capillary Refill: Capillary refill takes less than 2 seconds.      Findings: No rash.    Neurological:      Mental Status: She is alert and oriented to person, place, and time.      Cranial Nerves: No cranial nerve deficit.      Sensory: No sensory deficit.      Coordination: Coordination normal.   Psychiatric:         Behavior: Behavior normal.         Thought Content: Thought content normal.         Judgment: Judgment normal.             Significant Labs: All pertinent labs within the past 24 hours have been reviewed.    Significant Imaging: I have reviewed all pertinent imaging results/findings within the past 24 hours.

## 2024-10-21 NOTE — ED NOTES
Report received from CHAIM Bruner. Assume care of pt. Pt resting comfortably and independently repositioned in stretcher with bed locked in lowest position for safety. NAD noted at this time. Respirations even and unlabored and visible chest rise noted.  Patient offered bathroom assistance and denies need at this time. Pt instructed to call if assistance is needed. Pt on continuous cardiac, BP, and O2 monitoring. Call light within reach. No needs at this time. Will continue to monitor.

## 2024-10-21 NOTE — PLAN OF CARE
[facility] - Emergency Dept  Initial Discharge Assessment       Primary Care Provider: Chelsea, Primary Doctor    Admission Diagnosis: Optic neuritis [H46.9]    Admission Date: 10/20/2024  Expected Discharge Date:     Pt stated that she is independent with her ambulation and ADL's and does not require assistance or equipment    Pt to d/c home with no needs when ready    Transition of Care Barriers: (P) None    Payor: MEDICAID / Plan: [employer] / Product Type: Managed Medicaid /     Extended Emergency Contact Information  Primary Emergency Contact: Papo Barbre   [place]  Mobile Phone: [phone]  Relation: Significant other  Preferred language: English   needed? No    Discharge Plan A: (P) Home  Discharge Plan B: (P) Home      [facility] - [place], [place] - [address]  [address]  [place] [address]  Phone: [phone] Fax: [phone]    [facility] [place], [place] - [address]  [address]  [place] [address]  Phone: [phone] Fax: [phone]      Initial Assessment (most recent)       Adult Discharge Assessment - 10/21/24 0718          Discharge Assessment    Assessment Type Discharge Planning Assessment (P)      Confirmed/corrected address, phone number and insurance Yes (P)      Confirmed Demographics Correct on Facesheet (P)      Source of Information patient (P)      Reason For Admission Optic neuritis (P)      People in Home significant other;child(una), dependent (P)      Facility Arrived From: home (P)      Do you expect to return to your current living situation? Yes (P)      Do you have help at home or someone to help you manage your care at home? No (P)      Prior to hospitilization cognitive status: Alert/Oriented;No Deficits (P)      Current cognitive status: Alert/Oriented;No Deficits (P)      Walking or Climbing Stairs Difficulty no (P)      Dressing/Bathing Difficulty no (P)      Home Accessibility wheelchair accessible (P)       Home Layout Able to live on 1st floor (P)      Equipment Currently Used at Home none (P)      Patient currently being followed by outpatient case management? No (P)      Do you currently have service(s) that help you manage your care at home? No (P)      Do you have any problems affording any of your prescribed medications? No (P)      Is the patient taking medications as prescribed? yes (P)      Who is going to help you get home at discharge? family/friends (P)      How do you get to doctors appointments? car, drives self (P)      Are you on dialysis? No (P)      Do you take coumadin? No (P)      Discharge Plan A Home (P)      Discharge Plan B Home (P)      DME Needed Upon Discharge  none (P)      Discharge Plan discussed with: Patient (P)      Transition of Care Barriers None (P)         Physical Activity    On average, how many days per week do you engage in moderate to strenuous exercise (like a brisk walk)? 0 days (P)      On average, how many minutes do you engage in exercise at this level? 0 min (P)         Financial Resource Strain    How hard is it for you to pay for the very basics like food, housing, medical care, and heating? Not very hard (P)         Housing Stability    In the last 12 months, was there a time when you were not able to pay the mortgage or rent on time? No (P)      At any time in the past 12 months, were you homeless or living in a shelter (including now)? No (P)         Transportation Needs    Has the lack of transportation kept you from medical appointments, meetings, work or from getting things needed for daily living? No (P)         Food Insecurity    Within the past 12 months, you worried that your food would run out before you got the money to buy more. Never true (P)      Within the past 12 months, the food you bought just didn't last and you didn't have money to get more. Never true (P)         Stress    Do you feel stress - tense, restless, nervous, or anxious, or unable to  sleep at night because your mind is troubled all the time - these days? To some extent (P)         Social Isolation    How often do you feel lonely or isolated from those around you?  Never (P)         Alcohol Use    Q1: How often do you have a drink containing alcohol? Never (P)      Q2: How many drinks containing alcohol do you have on a typical day when you are drinking? Patient does not drink (P)      Q3: How often do you have six or more drinks on one occasion? Never (P)         Health Literacy    How often do you need to have someone help you when you read instructions, pamphlets, or other written material from your doctor or pharmacy? Rarely (P)         OTHER    Name(s) of People in Home significant other Papo and 2 children (P)

## 2024-10-21 NOTE — SUBJECTIVE & OBJECTIVE
Past Medical History:   Diagnosis Date    Depression        Past Surgical History:   Procedure Laterality Date    TUBAL LIGATION         Review of patient's allergies indicates:  No Known Allergies    Current Facility-Administered Medications on File Prior to Encounter   Medication    [COMPLETED] gadobutroL (GADAVIST) injection 8 mL    [COMPLETED] iohexoL (OMNIPAQUE 350) injection 100 mL     Current Outpatient Medications on File Prior to Encounter   Medication Sig    cholecalciferol, vitamin D3, 1,250 mcg (50,000 unit) capsule Take 50,000 Units by mouth every 7 days.    naproxen (NAPROSYN) 250 MG tablet Take 1 tablet (250 mg total) by mouth every meal as needed (back pain).     Family History    None       Tobacco Use    Smoking status: Never    Smokeless tobacco: Never   Substance and Sexual Activity    Alcohol use: Not on file    Drug use: Never    Sexual activity: Not on file     Review of Systems   Constitutional:  Negative for chills, fatigue and fever.   Eyes:  Positive for pain and visual disturbance.   Respiratory:  Negative for chest tightness, shortness of breath and wheezing.    Cardiovascular:  Negative for chest pain and leg swelling.   Gastrointestinal:  Negative for abdominal distention, abdominal pain, nausea and vomiting.   Genitourinary:  Negative for decreased urine volume, dysuria, frequency and hematuria.   Musculoskeletal:  Negative for arthralgias.   Skin:  Negative for color change and wound.   Neurological:  Positive for numbness and headaches. Negative for dizziness, syncope and weakness.   Psychiatric/Behavioral:  Negative for behavioral problems, confusion and decreased concentration.      Objective:     Vital Signs (Most Recent):  Temp: 98.3 °F (36.8 °C) (10/21/24 0013)  Pulse: 74 (10/21/24 0013)  Resp: 18 (10/21/24 0013)  BP: 114/80 (10/21/24 0013)  SpO2: 97 % (10/21/24 0013) Vital Signs (24h Range):  Temp:  [98.1 °F (36.7 °C)-98.8 °F (37.1 °C)] 98.3 °F (36.8 °C)  Pulse:  [69-90]  74  Resp:  [15-18] 18  SpO2:  [97 %-100 %] 97 %  BP: (114-131)/(80-95) 114/80     Weight: 86.2 kg (190 lb)  Body mass index is 33.66 kg/m².     Physical Exam  Vitals and nursing note reviewed.   Constitutional:       General: She is not in acute distress.     Appearance: She is well-developed. She is obese.   HENT:      Head: Normocephalic and atraumatic.      Mouth/Throat:      Pharynx: No oropharyngeal exudate.   Eyes:      General: No scleral icterus.     Conjunctiva/sclera: Conjunctivae normal.      Comments: Pain with eye movement   Cardiovascular:      Rate and Rhythm: Normal rate and regular rhythm.      Heart sounds: Normal heart sounds.   Pulmonary:      Effort: Pulmonary effort is normal. No respiratory distress.      Breath sounds: Normal breath sounds. No wheezing.   Abdominal:      General: Bowel sounds are normal. There is no distension.      Palpations: Abdomen is soft.      Tenderness: There is no abdominal tenderness.   Musculoskeletal:         General: No tenderness. Normal range of motion.      Cervical back: Normal range of motion and neck supple.   Lymphadenopathy:      Cervical: No cervical adenopathy.   Skin:     General: Skin is warm and dry.      Capillary Refill: Capillary refill takes less than 2 seconds.      Findings: No rash.   Neurological:      Mental Status: She is alert and oriented to person, place, and time.      Cranial Nerves: No cranial nerve deficit.      Sensory: No sensory deficit.      Coordination: Coordination normal.   Psychiatric:         Behavior: Behavior normal.         Thought Content: Thought content normal.         Judgment: Judgment normal.                Significant Labs: All pertinent labs within the past 24 hours have been reviewed.  CBC:   Recent Labs   Lab 10/20/24  1713   WBC 9.10   HGB 14.2   HCT 42.1        CMP:   Recent Labs   Lab 10/20/24  1713      K 4.6      CO2 28   GLU 91   BUN 8   CREATININE 0.7   CALCIUM 8.9   PROT 7.0   ALBUMIN  3.9   BILITOT 0.7   ALKPHOS 114   AST 18   ALT 16   ANIONGAP 6*       Significant Imaging: I have reviewed all pertinent imaging results/findings within the past 24 hours.  MRI Brain W WO Contrast  Narrative: EXAMINATION:  MRI BRAIN W WO CONTRAST    CLINICAL HISTORY:  hemianopsia, thin cuts requested by neurology;    TECHNIQUE:  Multiplanar multisequence MR imaging of the brain was performed before and after the administration of 8 mL Gadavist intravenous contrast.    COMPARISON:  CTA head 10/20/2024    FINDINGS:  There is no evidence of abnormal restricted diffusion to suggest acute infarction.    There is apparent asymmetric T2/FLAIR hyperintensity within the right medial temporal lobe.  No definite associated restricted diffusion or enhancement appreciated.  There are a few scattered foci of nonspecific supratentorial T2/FLAIR hyperintensity.  There is no evidence of midline shift.  There is no hydrocephalus.  No extra-axial collections are appreciated.    There is apparent asymmetric enhancement and T2 hyperintensity of the left optic nerve which may reflect a nonspecific optic neuritis.  The globes appear intact, however correlation with direct physical examination advised.  The craniocervical junction is within normal limits.  The major skull base T2 flow voids are present.  Impression: 1.  Asymmetric T2/FLAIR hyperintensity within the medial right temporal lobe without definite corresponding restricted diffusion or enhancement appreciated.  This is nonspecific with differential considerations including but not limited to sequelae of prior seizure or nonspecific encephalitis including inflammatory, autoimmune (i.e.  limbic), or infectious etiologies including HSV related.  Low-grade neoplastic process not excluded and short-term interval repeat follow-up is advised.  Neurologic consultation is advised as well as consideration for CSF sampling as clinically warranted.    2.  Asymmetric enhancement and T2 signal  hyperintensity of the left optic nerve which may reflect a nonspecific optic neuritis including but not limited to inflammatory or infectious etiologies.    2.  Few scattered punctate foci of supratentorial T2/FLAIR hyperintensity without appreciable enhancement.  These lesions are nonspecific in appearance and may be seen with accelerated small vessel ischemic disease, vasculopathies, migraine headaches, and as the residua from inflammatory and traumatic insults to the brain.    This report was flagged in Epic as abnormal.    Electronically signed by: Sonny Cruz MD  Date:    10/20/2024  Time:    19:46  CTA Brain  Narrative: EXAMINATION:  CTA HEAD    CLINICAL HISTORY:  visual disturbance;    TECHNIQUE:  CT angiogram was performed from the level of the bottom of C2 to the top of the head following the IV administration of 100 mL of Omnipaque 350.   Sagittal and coronal  maximum intensity projection reconstructions were performed.    COMPARISON:  None    FINDINGS:  Intracranial Compartment:    Limited intracranial views demonstrate no evidence of hydrocephalus or midline shift.  The sellar region appear grossly within normal limits.    Skull/Extracranial Contents (limited evaluation): No fracture. There is trace opacification of the left sphenoid sinus.  The remaining paranasal sinuses and mastoid air cells are relatively well aerated.    Intracranial Arteries: The bilateral intracranial ICAs are patent without evidence of high-grade stenosis or occlusion.  The bilateral MCA's and DONELL's are patent without evidence of high-grade stenosis or proximal large vessel occlusion.    The bilateral distal vertebral arteries and basilar artery are patent.  The bilateral PCA's appear patent without evidence of high-grade stenosis or proximal large vessel occlusion.    Venous structures (limited evaluation): The visualized major dural venous sinuses are patent.  No evidence of vascular malformation.  Impression: No acute  abnormality. No high-grade stenosis or major vessel occlusion.    Electronically signed by: Sonny Cruz MD  Date:    10/20/2024  Time:    16:23

## 2024-10-21 NOTE — H&P
"  [facility] - Emergency Dept  [facility] Medicine  History & Physical    Patient Name: Dalia Collado  MRN: [id number]  Patient Class: OP- Observation  Admission Date: 10/20/2024  Attending Physician: Papo Turner MD   Primary Care Provider: Chelsea, Primary Doctor         Patient information was obtained from patient, past medical records, and ER records.     Subjective:     Principal Problem:Optic neuritis    Chief Complaint:   Chief Complaint   Patient presents with    [place] transfer     Retinal detachment, here for optho.         HPI: Dalia Collado is a 34 y.o. female with no significant PMHx who presents to [facility] from [facility] from evaluation of optic neuritis. Patient reports left eye pain began about 5 days ago. Pain is sharp and severe with eye movement, especially when looking to the left. She also feels pressure in her head when she lays down for the past few days. She reports vision changes described as a dark curtain over part of her visual field in the left eye began yesterday when she was outside in the bright sunlight. Says it's like she's looking through a dark film. Says the "dark curtain" vision changes only occur when in light so she's been sitting inside in darkness since yesterday. She has blurry vision since her eyes were dialted by ophthalmology at [facility], but denies any blurry vision prior to this. Additioanlly, she reports mild tingling sensation in her bilteral upper extremities from her elbow to her fingertips when she lays in certain positions for the past few days. Says it feels similar to when your arm or leg "falls asleep". She reports occasional tension headaches which typically resolves with aspirin or tylenol for many years. No thunderclap headaches. Denies any weakness, confusion, new neck/back pain, fever, chills, SOB or syncope.     Patient was seen at [place] where an MRI showed asymmetric T2/FLAIR hyperintensity within the medial right temporal lobe and concern for  " optic neuritis. She was sent here for ophthalmology and neuro eval. In the ED, AFVSS. No leukocytosis of electrolyte abnormalities. Given IV toradol.     Past Medical History:   Diagnosis Date    Depression        Past Surgical History:   Procedure Laterality Date    TUBAL LIGATION         Review of patient's allergies indicates:  No Known Allergies    Current Facility-Administered Medications on File Prior to Encounter   Medication    [COMPLETED] gadobutroL (GADAVIST) injection 8 mL    [COMPLETED] iohexoL (OMNIPAQUE 350) injection 100 mL     Current Outpatient Medications on File Prior to Encounter   Medication Sig    cholecalciferol, vitamin D3, 1,250 mcg (50,000 unit) capsule Take 50,000 Units by mouth every 7 days.    naproxen (NAPROSYN) 250 MG tablet Take 1 tablet (250 mg total) by mouth every meal as needed (back pain).     Family History    None       Tobacco Use    Smoking status: Never    Smokeless tobacco: Never   Substance and Sexual Activity    Alcohol use: Not on file    Drug use: Never    Sexual activity: Not on file     Review of Systems   Constitutional:  Negative for chills, fatigue and fever.   Eyes:  Positive for pain and visual disturbance.   Respiratory:  Negative for chest tightness, shortness of breath and wheezing.    Cardiovascular:  Negative for chest pain and leg swelling.   Gastrointestinal:  Negative for abdominal distention, abdominal pain, nausea and vomiting.   Genitourinary:  Negative for decreased urine volume, dysuria, frequency and hematuria.   Musculoskeletal:  Negative for arthralgias.   Skin:  Negative for color change and wound.   Neurological:  Positive for numbness and headaches. Negative for dizziness, syncope and weakness.   Psychiatric/Behavioral:  Negative for behavioral problems, confusion and decreased concentration.      Objective:     Vital Signs (Most Recent):  Temp: 98.3 °F (36.8 °C) (10/21/24 0013)  Pulse: 74 (10/21/24 0013)  Resp: 18 (10/21/24 0013)  BP: 114/80  (10/21/24 0013)  SpO2: 97 % (10/21/24 0013) Vital Signs (24h Range):  Temp:  [98.1 °F (36.7 °C)-98.8 °F (37.1 °C)] 98.3 °F (36.8 °C)  Pulse:  [69-90] 74  Resp:  [15-18] 18  SpO2:  [97 %-100 %] 97 %  BP: (114-131)/(80-95) 114/80     Weight: 86.2 kg (190 lb)  Body mass index is 33.66 kg/m².     Physical Exam  Vitals and nursing note reviewed.   Constitutional:       General: She is not in acute distress.     Appearance: She is well-developed. She is obese.   HENT:      Head: Normocephalic and atraumatic.      Mouth/Throat:      Pharynx: No oropharyngeal exudate.   Eyes:      General: No scleral icterus.     Conjunctiva/sclera: Conjunctivae normal.      Comments: Pain with eye movement   Cardiovascular:      Rate and Rhythm: Normal rate and regular rhythm.      Heart sounds: Normal heart sounds.   Pulmonary:      Effort: Pulmonary effort is normal. No respiratory distress.      Breath sounds: Normal breath sounds. No wheezing.   Abdominal:      General: Bowel sounds are normal. There is no distension.      Palpations: Abdomen is soft.      Tenderness: There is no abdominal tenderness.   Musculoskeletal:         General: No tenderness. Normal range of motion.      Cervical back: Normal range of motion and neck supple.   Lymphadenopathy:      Cervical: No cervical adenopathy.   Skin:     General: Skin is warm and dry.      Capillary Refill: Capillary refill takes less than 2 seconds.      Findings: No rash.   Neurological:      Mental Status: She is alert and oriented to person, place, and time.      Cranial Nerves: No cranial nerve deficit.      Sensory: No sensory deficit.      Coordination: Coordination normal.   Psychiatric:         Behavior: Behavior normal.         Thought Content: Thought content normal.         Judgment: Judgment normal.                Significant Labs: All pertinent labs within the past 24 hours have been reviewed.  CBC:   Recent Labs   Lab 10/20/24  1713   WBC 9.10   HGB 14.2   HCT 42.1   PLT  310     CMP:   Recent Labs   Lab 10/20/24  1713      K 4.6      CO2 28   GLU 91   BUN 8   CREATININE 0.7   CALCIUM 8.9   PROT 7.0   ALBUMIN 3.9   BILITOT 0.7   ALKPHOS 114   AST 18   ALT 16   ANIONGAP 6*       Significant Imaging: I have reviewed all pertinent imaging results/findings within the past 24 hours.  MRI Brain W WO Contrast  Narrative: EXAMINATION:  MRI BRAIN W WO CONTRAST    CLINICAL HISTORY:  hemianopsia, thin cuts requested by neurology;    TECHNIQUE:  Multiplanar multisequence MR imaging of the brain was performed before and after the administration of 8 mL Gadavist intravenous contrast.    COMPARISON:  CTA head 10/20/2024    FINDINGS:  There is no evidence of abnormal restricted diffusion to suggest acute infarction.    There is apparent asymmetric T2/FLAIR hyperintensity within the right medial temporal lobe.  No definite associated restricted diffusion or enhancement appreciated.  There are a few scattered foci of nonspecific supratentorial T2/FLAIR hyperintensity.  There is no evidence of midline shift.  There is no hydrocephalus.  No extra-axial collections are appreciated.    There is apparent asymmetric enhancement and T2 hyperintensity of the left optic nerve which may reflect a nonspecific optic neuritis.  The globes appear intact, however correlation with direct physical examination advised.  The craniocervical junction is within normal limits.  The major skull base T2 flow voids are present.  Impression: 1.  Asymmetric T2/FLAIR hyperintensity within the medial right temporal lobe without definite corresponding restricted diffusion or enhancement appreciated.  This is nonspecific with differential considerations including but not limited to sequelae of prior seizure or nonspecific encephalitis including inflammatory, autoimmune (i.e.  limbic), or infectious etiologies including HSV related.  Low-grade neoplastic process not excluded and short-term interval repeat follow-up is  advised.  Neurologic consultation is advised as well as consideration for CSF sampling as clinically warranted.    2.  Asymmetric enhancement and T2 signal hyperintensity of the left optic nerve which may reflect a nonspecific optic neuritis including but not limited to inflammatory or infectious etiologies.    2.  Few scattered punctate foci of supratentorial T2/FLAIR hyperintensity without appreciable enhancement.  These lesions are nonspecific in appearance and may be seen with accelerated small vessel ischemic disease, vasculopathies, migraine headaches, and as the residua from inflammatory and traumatic insults to the brain.    This report was flagged in Epic as abnormal.    Electronically signed by: Sonny Cruz MD  Date:    10/20/2024  Time:    19:46  CTA Brain  Narrative: EXAMINATION:  CTA HEAD    CLINICAL HISTORY:  visual disturbance;    TECHNIQUE:  CT angiogram was performed from the level of the bottom of C2 to the top of the head following the IV administration of 100 mL of Omnipaque 350.   Sagittal and coronal  maximum intensity projection reconstructions were performed.    COMPARISON:  None    FINDINGS:  Intracranial Compartment:    Limited intracranial views demonstrate no evidence of hydrocephalus or midline shift.  The sellar region appear grossly within normal limits.    Skull/Extracranial Contents (limited evaluation): No fracture. There is trace opacification of the left sphenoid sinus.  The remaining paranasal sinuses and mastoid air cells are relatively well aerated.    Intracranial Arteries: The bilateral intracranial ICAs are patent without evidence of high-grade stenosis or occlusion.  The bilateral MCA's and DONELL's are patent without evidence of high-grade stenosis or proximal large vessel occlusion.    The bilateral distal vertebral arteries and basilar artery are patent.  The bilateral PCA's appear patent without evidence of high-grade stenosis or proximal large vessel  occlusion.    Venous structures (limited evaluation): The visualized major dural venous sinuses are patent.  No evidence of vascular malformation.  Impression: No acute abnormality. No high-grade stenosis or major vessel occlusion.    Electronically signed by: Sonny Cruz MD  Date:    10/20/2024  Time:    16:23      Assessment/Plan:     * Optic neuritis  - presents with L eye pain x5 days and and dark shadow of L eye x1 day  - MRI with findings as detailed above  - opthalmology consulted; appreciate recs  - neuro consulted; recs below:   --start IVMP 1g daily x5 days  --start empiric acyclovir dosed for CNS coverage for now  --check serum CNS demyelinating disease panel (this includes both NMO and MOG- no need to order these tests separately)  --will obtain LP as next diagnostic measure  - neuro checks q4h      VTE Risk Mitigation (From admission, onward)           Ordered     enoxaparin injection 40 mg  Daily         10/20/24 2210     IP VTE HIGH RISK PATIENT  Once         10/20/24 2210                         On 10/21/2024, patient should be placed in hospital observation services under my care in collaboration with Dr. Erich Thomason.           Stephanie Aguilar PA-C  Department of Hospital Medicine  [facility] - Emergency Dept

## 2024-10-21 NOTE — CONSULTS
"[facility] - Internal Medicine Telemetry  Neurology  Consult Note    Patient Name: Dalia Collado  MRN: [id number]  Admission Date: 10/20/2024  Hospital Length of Stay: 0 days  Code Status: Full Code   Attending Provider: Jose Angel Chaney*   Consulting Provider: Juancarlos Zambrano MD  Primary Care Physician: Chelsea, Primary Doctor  Principal Problem:Optic neuritis    Inpatient consult to neurology  Consult performed by: Juancarlos Zambrano MD  Consult ordered by: Brunilda Simmons PA-C         Subjective:     Chief Complaint:  Optic neuritis      HPI:   Dalia Collado is a 34 year old female with history of depression who presents to [place] for optic neuritis. She reports sharp left eye pain that started 5 days ago, worse with looking to the left. In addition, she describes a "dark curtain" vision change in the left eye that has since improved. There is reported tingling in bilateral upper extremities from the elbow to her fingertips. CTA without vessel occlusions. MRI with L optic nerve enhancement and FLAIR hypertintensity in R temporal lobe. She denies other notable family history or new medications. Given findings on imaging we recommended starting IV solumedrol and acyclovir. Temporal lobe findings can be seen with HSV encephalitis though her exam is not consistent and could also be incidental. Nonetheless additional cervical and thoracic warranted to rule out demyelinating disease elsewhere.      Past Medical History:   Diagnosis Date    Depression        Past Surgical History:   Procedure Laterality Date    TUBAL LIGATION         Review of patient's allergies indicates:  No Known Allergies    Current Neurological Medications: see below     Current Facility-Administered Medications on File Prior to Encounter   Medication    [COMPLETED] gadobutroL (GADAVIST) injection 8 mL     Current Outpatient Medications on File Prior to Encounter   Medication Sig    cholecalciferol, vitamin D3, 1,250 mcg (50,000 unit) " capsule Take 50,000 Units by mouth every 7 days.    naproxen (NAPROSYN) 250 MG tablet Take 1 tablet (250 mg total) by mouth every meal as needed (back pain).     Family History    None       Tobacco Use    Smoking status: Never    Smokeless tobacco: Never   Substance and Sexual Activity    Alcohol use: Not on file    Drug use: Never    Sexual activity: Not on file     Review of Systems   Constitutional:  Negative for fever.   Eyes:  Positive for visual disturbance.   Respiratory:  Negative for shortness of breath.    Neurological:  Negative for tremors, seizures, weakness and headaches.   Psychiatric/Behavioral:  Negative for behavioral problems.      Objective:     Vital Signs (Most Recent):  Temp: 98 °F (36.7 °C) (10/21/24 1546)  Pulse: (!) 111 (10/21/24 1546)  Resp: 16 (10/21/24 1546)  BP: (!) 140/89 (10/21/24 1546)  SpO2: 99 % (10/21/24 1546) Vital Signs (24h Range):  Temp:  [98 °F (36.7 °C)-98.8 °F (37.1 °C)] 98 °F (36.7 °C)  Pulse:  [] 111  Resp:  [14-18] 16  SpO2:  [95 %-100 %] 99 %  BP: (103-140)/(73-94) 140/89     Weight: 110.2 kg (242 lb 15.2 oz)  Body mass index is 43.04 kg/m².     Physical Exam  Eyes:      Pupils: Pupils are equal, round, and reactive to light.      Comments: Small area of left nasal visual field defect, EOM normal, no gaze palsy   Cardiovascular:      Rate and Rhythm: Normal rate.   Pulmonary:      Effort: Pulmonary effort is normal.   Neurological:      Mental Status: She is alert and oriented to person, place, and time.      Cranial Nerves: Cranial nerves 2-12 are intact.          NEUROLOGICAL EXAMINATION:     MENTAL STATUS   Oriented to person, place, and time.     CRANIAL NERVES   Cranial nerves II through XII intact.     CN III, IV, VI   Pupils are equal, round, and reactive to light.    MOTOR EXAM   Overall muscle tone: normal    SENSORY EXAM   Light touch normal.     GAIT AND COORDINATION     Tremor   Resting tremor: absent      Significant Labs: CBC:   Recent Labs   Lab  10/20/24  1713 10/21/24  0415   WBC 9.10 10.22   HGB 14.2 13.5   HCT 42.1 40.4    292     CMP:   Recent Labs   Lab 10/20/24  1713 10/21/24  0415   GLU 91 177*    138   K 4.6 3.6    106   CO2 28 20*   BUN 8 9   CREATININE 0.7 0.8   CALCIUM 8.9 8.9   MG 1.9 1.9   PROT 7.0  --    ALBUMIN 3.9  --    BILITOT 0.7  --    ALKPHOS 114  --    AST 18  --    ALT 16  --    ANIONGAP 6* 12       Significant Imaging: I have reviewed all pertinent imaging results/findings within the past 24 hours.  Assessment and Plan:     * Optic neuritis  Dalia Collado is a 34 year old female who presents for optic neuritis. She has pain localized in the left eye that is exacerbated when looking to the left and minimal left nasal visual field defect. MRI with L optic neuritic and R FLAIR temporal hyperintensity that could be incidental vs. Inflammatory process; case reviewed with radiology. Recommend additional work up to rule out other possible demyelinating lesions and to perform LP after MRI for CSF studies.     Recommendations:  - Continue IV solumedrol x 5 days  - IV acyclovir  - Obtain MRI brain cervical/thoracic demyelinating w/wo contrast  - Will Perform LP after MRI  - Ordered CSF studies and added meningitis-encephalitis panel and additional serum encephalopathy panel  - Consider spot EEG after above work up to rule out focal brain wave abnormality in the right temporal lobe  - Will continue to follow         VTE Risk Mitigation (From admission, onward)           Ordered     enoxaparin injection 40 mg  Daily         10/20/24 2210     IP VTE HIGH RISK PATIENT  Once         10/20/24 2210                    Thank you for your consult. I will follow-up with patient. Please contact us if you have any additional questions.    Juancarlos Zambrano MD  Neurology  [facility] - Internal Medicine Telemetry

## 2024-10-21 NOTE — HPI
"Dalia Collado is a 34 y.o. female with no significant PMHx who presents to [facility] from [facility] from evaluation of optic neuritis. Patient reports left eye pain began about 5 days ago. Pain is sharp and severe with eye movement, especially when looking to the left. She also feels pressure in her head when she lays down for the past few days. She reports vision changes described as a dark curtain over part of her visual field in the left eye began yesterday when she was outside in the bright sunlight. Says it's like she's looking through a dark film. Says the "dark curtain" vision changes only occur when in light so she's been sitting inside in darkness since yesterday. She has blurry vision since her eyes were dialted by ophthalmology at [facility], but denies any blurry vision prior to this. Additioanlly, she reports mild tingling sensation in her bilteral upper extremities from her elbow to her fingertips when she lays in certain positions for the past few days. Says it feels similar to when your arm or leg "falls asleep". She reports occasional tension headaches which typically resolves with aspirin or tylenol for many years. No thunderclap headaches. Denies any weakness, confusion, new neck/back pain, fever, chills, SOB or syncope.     Patient was seen at [place] where an MRI showed asymmetric T2/FLAIR hyperintensity within the medial right temporal lobe and concern for  optic neuritis. She was sent here for ophthalmology and neuro eval. In the ED, AFVSS. No leukocytosis of electrolyte abnormalities. Given IV toradol.   "
"Dalia Collado is a 34 year old female with history of depression who presents to [place] for optic neuritis. She reports sharp left eye pain that started 5 days ago, worse with looking to the left. In addition, she describes a "dark curtain" vision change in the left eye that has since improved. There is reported tingling in bilateral upper extremities from the elbow to her fingertips. CTA without vessel occlusions. MRI with L optic nerve enhancement and FLAIR hypertintensity in R temporal lobe. She denies other notable family history or new medications. Given findings on imaging we recommended starting IV solumedrol and acyclovir. Temporal lobe findings can be seen with HSV encephalitis though her exam is not consistent and could also be incidental. Nonetheless additional cervical and thoracic warranted to rule out demyelinating disease elsewhere.   "
10

## 2024-10-21 NOTE — PROGRESS NOTES
"[facility] - Internal Medicine [facility] Medicine  Progress Note    Patient Name: Dalia Collado  MRN: [id number]  Patient Class: IP- Inpatient   Admission Date: 10/20/2024  Length of Stay: 0 days  Attending Physician: Jose Angel Chaney*  Primary Care Provider: Chelsea, Primary Doctor        Subjective:     Principal Problem:Optic neuritis        HPI:  Dalia Collado is a 34 y.o. female with no significant PMHx who presents to [facility] from [facility] from evaluation of optic neuritis. Patient reports left eye pain began about 5 days ago. Pain is sharp and severe with eye movement, especially when looking to the left. She also feels pressure in her head when she lays down for the past few days. She reports vision changes described as a dark curtain over part of her visual field in the left eye began yesterday when she was outside in the bright sunlight. Says it's like she's looking through a dark film. Says the "dark curtain" vision changes only occur when in light so she's been sitting inside in darkness since yesterday. She has blurry vision since her eyes were dialted by ophthalmology at [facility], but denies any blurry vision prior to this. Additioanlly, she reports mild tingling sensation in her bilteral upper extremities from her elbow to her fingertips when she lays in certain positions for the past few days. Says it feels similar to when your arm or leg "falls asleep". She reports occasional tension headaches which typically resolves with aspirin or tylenol for many years. No thunderclap headaches. Denies any weakness, confusion, new neck/back pain, fever, chills, SOB or syncope.     Patient was seen at [facility] where an MRI showed asymmetric T2/FLAIR hyperintensity within the medial right temporal lobe and concern for  optic neuritis. She was sent here for ophthalmology and neuro eval. In the ED, AFVSS. No leukocytosis of electrolyte abnormalities. Given IV toradol.     Overview/Hospital " Course:  No notes on file    Interval History: Patient admitted to hospital medicine. Has ongoing eye pain with left lateral. Vision unchanged from yesterday reports still has dark curtain over vision    Review of Systems  Objective:     Vital Signs (Most Recent):  Temp: 98.7 °F (37.1 °C) (10/21/24 1122)  Pulse: 106 (10/21/24 1122)  Resp: 17 (10/21/24 1122)  BP: 126/84 (10/21/24 1122)  SpO2: 96 % (10/21/24 1122) Vital Signs (24h Range):  Temp:  [98.1 °F (36.7 °C)-98.8 °F (37.1 °C)] 98.7 °F (37.1 °C)  Pulse:  [] 106  Resp:  [14-18] 17  SpO2:  [95 %-100 %] 96 %  BP: (103-131)/(73-95) 126/84     Weight: 110.2 kg (242 lb 15.2 oz)  Body mass index is 43.04 kg/m².    Intake/Output Summary (Last 24 hours) at 10/21/2024 1331  Last data filed at 10/21/2024 1007  Gross per 24 hour   Intake 198.08 ml   Output --   Net 198.08 ml         Physical Exam  Vitals and nursing note reviewed.   Constitutional:       General: She is not in acute distress.     Appearance: She is well-developed. She is obese.   HENT:      Head: Normocephalic and atraumatic.      Mouth/Throat:      Pharynx: No oropharyngeal exudate.   Eyes:      General: No scleral icterus.     Conjunctiva/sclera: Conjunctivae normal.      Comments: Pain with eye movement   Cardiovascular:      Rate and Rhythm: Normal rate and regular rhythm.      Heart sounds: Normal heart sounds.   Pulmonary:      Effort: Pulmonary effort is normal. No respiratory distress.      Breath sounds: Normal breath sounds. No wheezing.   Abdominal:      General: Bowel sounds are normal. There is no distension.      Palpations: Abdomen is soft.      Tenderness: There is no abdominal tenderness.   Musculoskeletal:         General: No tenderness. Normal range of motion.      Cervical back: Normal range of motion and neck supple.   Lymphadenopathy:      Cervical: No cervical adenopathy.   Skin:     General: Skin is warm and dry.      Capillary Refill: Capillary refill takes less than 2  seconds.      Findings: No rash.   Neurological:      Mental Status: She is alert and oriented to person, place, and time.      Cranial Nerves: No cranial nerve deficit.      Sensory: No sensory deficit.      Coordination: Coordination normal.   Psychiatric:         Behavior: Behavior normal.         Thought Content: Thought content normal.         Judgment: Judgment normal.             Significant Labs: All pertinent labs within the past 24 hours have been reviewed.    Significant Imaging: I have reviewed all pertinent imaging results/findings within the past 24 hours.    Assessment/Plan:      * Optic neuritis  - presents with L eye pain x5 days and and dark shadow of L eye x1 day  - MRI with findings as detailed above  - opthalmology consulted; appreciate recs  - neuro consulted; recs below:   --start IVMP 1g daily x5 days  --start empiric acyclovir dosed for CNS coverage for now  --check serum CNS demyelinating disease panel (this includes both NMO and MOG- no need to order these tests separately)  --will obtain LP as next diagnostic measure  - neuro checks q4h      VTE Risk Mitigation (From admission, onward)           Ordered     enoxaparin injection 40 mg  Daily         10/20/24 2210     IP VTE HIGH RISK PATIENT  Once         10/20/24 2210                    Discharge Planning   JORDEN:      Code Status: Full Code   Is the patient medically ready for discharge?:     Reason for patient still in hospital (select all that apply): Patient trending condition, Treatment, and Consult recommendations  Discharge Plan A: Home        Jose Angel Chaney MD  Department of Hospital Medicine   [facility] - Internal Medicine Telemetry

## 2024-10-21 NOTE — SUBJECTIVE & OBJECTIVE
Past Medical History:   Diagnosis Date    Depression        Past Surgical History:   Procedure Laterality Date    TUBAL LIGATION         Review of patient's allergies indicates:  No Known Allergies    Current Neurological Medications: see below     Current Facility-Administered Medications on File Prior to Encounter   Medication    [COMPLETED] gadobutroL (GADAVIST) injection 8 mL     Current Outpatient Medications on File Prior to Encounter   Medication Sig    cholecalciferol, vitamin D3, 1,250 mcg (50,000 unit) capsule Take 50,000 Units by mouth every 7 days.    naproxen (NAPROSYN) 250 MG tablet Take 1 tablet (250 mg total) by mouth every meal as needed (back pain).     Family History    None       Tobacco Use    Smoking status: Never    Smokeless tobacco: Never   Substance and Sexual Activity    Alcohol use: Not on file    Drug use: Never    Sexual activity: Not on file     Review of Systems   Constitutional:  Negative for fever.   Eyes:  Positive for visual disturbance.   Respiratory:  Negative for shortness of breath.    Neurological:  Negative for tremors, seizures, weakness and headaches.   Psychiatric/Behavioral:  Negative for behavioral problems.      Objective:     Vital Signs (Most Recent):  Temp: 98 °F (36.7 °C) (10/21/24 1546)  Pulse: (!) 111 (10/21/24 1546)  Resp: 16 (10/21/24 1546)  BP: (!) 140/89 (10/21/24 1546)  SpO2: 99 % (10/21/24 1546) Vital Signs (24h Range):  Temp:  [98 °F (36.7 °C)-98.8 °F (37.1 °C)] 98 °F (36.7 °C)  Pulse:  [] 111  Resp:  [14-18] 16  SpO2:  [95 %-100 %] 99 %  BP: (103-140)/(73-94) 140/89     Weight: 110.2 kg (242 lb 15.2 oz)  Body mass index is 43.04 kg/m².     Physical Exam  Eyes:      Pupils: Pupils are equal, round, and reactive to light.      Comments: Small area of left nasal visual field defect, EOM normal, no gaze palsy   Cardiovascular:      Rate and Rhythm: Normal rate.   Pulmonary:      Effort: Pulmonary effort is normal.   Neurological:      Mental Status: She  is alert and oriented to person, place, and time.      Cranial Nerves: Cranial nerves 2-12 are intact.          NEUROLOGICAL EXAMINATION:     MENTAL STATUS   Oriented to person, place, and time.     CRANIAL NERVES   Cranial nerves II through XII intact.     CN III, IV, VI   Pupils are equal, round, and reactive to light.    MOTOR EXAM   Overall muscle tone: normal    SENSORY EXAM   Light touch normal.     GAIT AND COORDINATION     Tremor   Resting tremor: absent      Significant Labs: CBC:   Recent Labs   Lab 10/20/24  1713 10/21/24  0415   WBC 9.10 10.22   HGB 14.2 13.5   HCT 42.1 40.4    292     CMP:   Recent Labs   Lab 10/20/24  1713 10/21/24  0415   GLU 91 177*    138   K 4.6 3.6    106   CO2 28 20*   BUN 8 9   CREATININE 0.7 0.8   CALCIUM 8.9 8.9   MG 1.9 1.9   PROT 7.0  --    ALBUMIN 3.9  --    BILITOT 0.7  --    ALKPHOS 114  --    AST 18  --    ALT 16  --    ANIONGAP 6* 12       Significant Imaging: I have reviewed all pertinent imaging results/findings within the past 24 hours.

## 2024-10-21 NOTE — ED TRIAGE NOTES
Pt Arrives to ED via EMS from [facility]  with complaints of eye pain and here for opthalmology. PT states she has a sharp left eye pain when trying to look left . She described the vision in her left  like a sheer curtain blurring her vision from the proximal side of her left eye to her distal side . She endorses a headache, photophobia , and the sensation of pressure in her left eye.  At [facility] they did an MRI , CT  and ruled out retinal detachment.

## 2024-10-21 NOTE — ED PROVIDER NOTES
"Encounter Date: 10/20/2024       History     Chief Complaint   Patient presents with    [facility] transfer     Retinal detachment, here for optho.      34-year-old female presents to the ED as a transfer for ophthalmology and neurology evaluation of visual disturbance as well as MRI imaging with evidence of optic neuritis.  Per previous ED provider:  "The patient has a 5 day history of seeing floaters and what is described as a dark curtain over part of her visual field in the left eye.  She complains of sharp pressure that is severe in nature.  It is worse with exposure to bright light and with eye movements.  She is also complaining of pain behind her eye.  She denies anything improve his symptoms.  Denies taking any medications treat symptoms."    Patient denies any acute changes in vision during transfer.      Review of patient's allergies indicates:  No Known Allergies  Past Medical History:   Diagnosis Date    Depression      Past Surgical History:   Procedure Laterality Date    TUBAL LIGATION       No family history on file.  Social History     Tobacco Use    Smoking status: Never    Smokeless tobacco: Never   Substance Use Topics    Drug use: Never     Review of Systems    Physical Exam     Initial Vitals [10/20/24 2024]   BP Pulse Resp Temp SpO2   (!) 131/94 90 17 98.8 °F (37.1 °C) 100 %      MAP       --         Physical Exam    Nursing note and vitals reviewed.  Constitutional: She appears well-developed and well-nourished. She is not diaphoretic.  Non-toxic appearance. She does not appear ill. No distress.   HENT:   Head: Normocephalic and atraumatic.   Neck: Neck supple.   Cardiovascular:  Normal rate and regular rhythm.     Exam reveals no gallop and no friction rub.       No murmur heard.  Pulmonary/Chest: Effort normal and breath sounds normal. No accessory muscle usage. No tachypnea. No respiratory distress. She has no decreased breath sounds. She has no wheezes. She has no rhonchi. She has no rales. "   Abdominal: She exhibits no distension.   Musculoskeletal:      Cervical back: Neck supple.     Neurological: She is alert.   Skin: No rash noted.   Psychiatric: She has a normal mood and affect. Her behavior is normal.         ED Course   Procedures  Labs Reviewed   GRAM STAIN   CULTURE, CSF  (INCLUDES STAIN)   HIV 1 / 2 ANTIBODY   HEPATITIS C ANTIBODY   CNS DEMYELINATING DIS EVAL (AQP-4 IGG AND MOG IGG)   CSF CELL COUNT WITH DIFFERENTIAL   PROTEIN, CSF   GLUCOSE, CSF   FREEZE AND HOLD -    CSF CELL COUNT WITH DIFFERENTIAL   MS PROFILE   MS PROFILE BLOOD COLLECTION   NMO AQUAPORIN-4-IGG, CSF          Imaging Results    None          Medications   methylPREDNISolone sodium succinate (SOLU-MEDROL) 1,000 mg in D5W 100 mL IVPB (has no administration in time range)   acyclovir 520 mg in D5W 100 mL IVPB (has no administration in time range)   enoxaparin injection 40 mg (has no administration in time range)   albuterol-ipratropium 2.5 mg-0.5 mg/3 mL nebulizer solution 3 mL (has no administration in time range)   melatonin tablet 6 mg (has no administration in time range)   polyethylene glycol packet 17 g (has no administration in time range)   bisacodyL suppository 10 mg (has no administration in time range)   acetaminophen tablet 650 mg (has no administration in time range)   glucose chewable tablet 16 g (has no administration in time range)   glucose chewable tablet 24 g (has no administration in time range)   glucagon (human recombinant) injection 1 mg (has no administration in time range)   dextrose 10% bolus 125 mL 125 mL (has no administration in time range)   dextrose 10% bolus 250 mL 250 mL (has no administration in time range)   ketorolac injection 9.999 mg (9.999 mg Intravenous Given 10/20/24 2148)     Medical Decision Making  34-year-old female presents to the ED as a transfer for neurology and ophthalmology evaluation of visual disturbance as well as MRI imaging concerning for optic neuritis.  Vitals  within while limits.  Afebrile.    Consulted Ophthalmology and Neurology promptly upon patient's arrival.  She will be admitted to Hospital Medicine for further management and treatment of optic neuritis.  Discussed likely possibility of LP with the patient.  She voiced understanding and is agreeable to admission.     Discussed with Hospital Medicine.      Risk  Prescription drug management.                                      Clinical Impression:  Final diagnoses:  [H46.9] Optic neuritis (Primary)  [R07.9] Chest pain          ED Disposition Condition    Observation Stable                Brunilda Simmons PA-C  10/20/24 9784

## 2024-10-21 NOTE — ED NOTES
Telemetry Verification   Patient placed on Telemetry Box  Verified with War Room  Box # [id number]   Monitor Tech War room   Rate 85   Rhythm Normal sinus

## 2024-10-22 LAB
ANION GAP SERPL CALC-SCNC: 9 MMOL/L (ref 8–16)
BASOPHILS # BLD AUTO: 0.01 K/UL (ref 0–0.2)
BASOPHILS NFR BLD: 0 % (ref 0–1.9)
BUN SERPL-MCNC: 10 MG/DL (ref 6–20)
C GATTII+NEOFOR DNA CSF QL NAA+NON-PROBE: NOT DETECTED
CALCIUM SERPL-MCNC: 9.4 MG/DL (ref 8.7–10.5)
CHLORIDE SERPL-SCNC: 109 MMOL/L (ref 95–110)
CLARITY CSF: CLEAR
CLARITY CSF: CLEAR
CMV DNA CSF QL NAA+NON-PROBE: NOT DETECTED
CO2 SERPL-SCNC: 22 MMOL/L (ref 23–29)
COLOR CSF: COLORLESS
COLOR CSF: COLORLESS
CREAT SERPL-MCNC: 0.8 MG/DL (ref 0.5–1.4)
CSF TUBE NUMBER: 2
CSF TUBE NUMBER: 2
DIFFERENTIAL METHOD BLD: ABNORMAL
E COLI K1 DNA CSF QL NAA+NON-PROBE: NOT DETECTED
EOSINOPHIL # BLD AUTO: 0 K/UL (ref 0–0.5)
EOSINOPHIL NFR BLD: 0 % (ref 0–8)
ERYTHROCYTE [DISTWIDTH] IN BLOOD BY AUTOMATED COUNT: 13 % (ref 11.5–14.5)
EST. GFR  (NO RACE VARIABLE): >60 ML/MIN/1.73 M^2
EV RNA CSF QL NAA+NON-PROBE: NOT DETECTED
GLUCOSE CSF-MCNC: 99 MG/DL (ref 40–70)
GLUCOSE SERPL-MCNC: 170 MG/DL (ref 70–110)
GP B STREP DNA CSF QL NAA+NON-PROBE: NOT DETECTED
HAEM INFLU DNA CSF QL NAA+NON-PROBE: NOT DETECTED
HAEM INFLU DNA CSF QL NAA+NON-PROBE: NOT DETECTED
HCT VFR BLD AUTO: 38.5 % (ref 37–48.5)
HGB BLD-MCNC: 13 G/DL (ref 12–16)
HHV6 DNA CSF QL NAA+NON-PROBE: NOT DETECTED
HSV1 DNA CSF QL NAA+NON-PROBE: NOT DETECTED
HSV2 DNA CSF QL NAA+NON-PROBE: NOT DETECTED
IMM GRANULOCYTES # BLD AUTO: 0.18 K/UL (ref 0–0.04)
IMM GRANULOCYTES NFR BLD AUTO: 0.9 % (ref 0–0.5)
LYMPHOCYTES # BLD AUTO: 1.1 K/UL (ref 1–4.8)
LYMPHOCYTES NFR BLD: 5.1 % (ref 18–48)
LYMPHOCYTES NFR CSF MANUAL: 72 % (ref 40–80)
LYMPHOCYTES NFR CSF MANUAL: 82 % (ref 40–80)
MAGNESIUM SERPL-MCNC: 1.9 MG/DL (ref 1.6–2.6)
MCH RBC QN AUTO: 29.5 PG (ref 27–31)
MCHC RBC AUTO-ENTMCNC: 33.8 G/DL (ref 32–36)
MCV RBC AUTO: 88 FL (ref 82–98)
MONOCYTES # BLD AUTO: 0.4 K/UL (ref 0.3–1)
MONOCYTES NFR BLD: 2 % (ref 4–15)
MONOS+MACROS NFR CSF MANUAL: 12 % (ref 15–45)
MONOS+MACROS NFR CSF MANUAL: 27 % (ref 15–45)
N MEN DNA CSF QL NAA+NON-PROBE: NOT DETECTED
NEUTROPHILS # BLD AUTO: 19.4 K/UL (ref 1.8–7.7)
NEUTROPHILS NFR BLD: 92 % (ref 38–73)
NEUTROPHILS NFR CSF MANUAL: 1 % (ref 0–6)
NEUTROPHILS NFR CSF MANUAL: 6 % (ref 0–6)
NRBC BLD-RTO: 0 /100 WBC
PARECHOVIRUS A RNA CSF QL NAA+NON-PROBE: NOT DETECTED
PHOSPHATE SERPL-MCNC: 2.6 MG/DL (ref 2.7–4.5)
PLATELET # BLD AUTO: 326 K/UL (ref 150–450)
PMV BLD AUTO: 10.2 FL (ref 9.2–12.9)
POTASSIUM SERPL-SCNC: 4.2 MMOL/L (ref 3.5–5.1)
PROT CSF-MCNC: 15 MG/DL (ref 15–40)
RBC # BLD AUTO: 4.4 M/UL (ref 4–5.4)
RBC # CSF: 1 /CU MM
RBC # CSF: 9 /CU MM
S PNEUM DNA CSF QL NAA+NON-PROBE: NOT DETECTED
SODIUM SERPL-SCNC: 140 MMOL/L (ref 136–145)
SPECIMEN VOL CSF: 6 ML
SPECIMEN VOL CSF: 6 ML
VZV DNA CSF QL NAA+NON-PROBE: NOT DETECTED
WBC # BLD AUTO: 21.1 K/UL (ref 3.9–12.7)
WBC # CSF: 1 /CU MM (ref 0–5)
WBC # CSF: 3 /CU MM (ref 0–5)

## 2024-10-22 PROCEDURE — 85025 COMPLETE CBC W/AUTO DIFF WBC: CPT | Performed by: PHYSICIAN ASSISTANT

## 2024-10-22 PROCEDURE — 80048 BASIC METABOLIC PNL TOTAL CA: CPT | Performed by: PHYSICIAN ASSISTANT

## 2024-10-22 PROCEDURE — 82945 GLUCOSE OTHER FLUID: CPT

## 2024-10-22 PROCEDURE — 21400001 HC TELEMETRY ROOM

## 2024-10-22 PROCEDURE — 87483 CNS DNA AMP PROBE TYPE 12-25: CPT

## 2024-10-22 PROCEDURE — 84157 ASSAY OF PROTEIN OTHER: CPT

## 2024-10-22 PROCEDURE — 25000003 PHARM REV CODE 250

## 2024-10-22 PROCEDURE — 86053 AQAPRN-4 ANTB FLO CYTMTRY EA: CPT

## 2024-10-22 PROCEDURE — A9585 GADOBUTROL INJECTION: HCPCS | Performed by: STUDENT IN AN ORGANIZED HEALTH CARE EDUCATION/TRAINING PROGRAM

## 2024-10-22 PROCEDURE — 84100 ASSAY OF PHOSPHORUS: CPT | Performed by: PHYSICIAN ASSISTANT

## 2024-10-22 PROCEDURE — 63600175 PHARM REV CODE 636 W HCPCS

## 2024-10-22 PROCEDURE — 009U3ZX DRAINAGE OF SPINAL CANAL, PERCUTANEOUS APPROACH, DIAGNOSTIC: ICD-10-PCS | Performed by: STUDENT IN AN ORGANIZED HEALTH CARE EDUCATION/TRAINING PROGRAM

## 2024-10-22 PROCEDURE — 25500020 PHARM REV CODE 255: Performed by: STUDENT IN AN ORGANIZED HEALTH CARE EDUCATION/TRAINING PROGRAM

## 2024-10-22 PROCEDURE — 99233 SBSQ HOSP IP/OBS HIGH 50: CPT | Mod: ,,, | Performed by: STUDENT IN AN ORGANIZED HEALTH CARE EDUCATION/TRAINING PROGRAM

## 2024-10-22 PROCEDURE — 89051 BODY FLUID CELL COUNT: CPT

## 2024-10-22 PROCEDURE — 86255 FLUORESCENT ANTIBODY SCREEN: CPT | Mod: 59

## 2024-10-22 PROCEDURE — 25000003 PHARM REV CODE 250: Performed by: PHYSICIAN ASSISTANT

## 2024-10-22 PROCEDURE — 87070 CULTURE OTHR SPECIMN AEROBIC: CPT

## 2024-10-22 PROCEDURE — 87205 SMEAR GRAM STAIN: CPT

## 2024-10-22 PROCEDURE — 83735 ASSAY OF MAGNESIUM: CPT | Performed by: PHYSICIAN ASSISTANT

## 2024-10-22 RX ORDER — GADOBUTROL 604.72 MG/ML
10 INJECTION INTRAVENOUS
Status: COMPLETED | OUTPATIENT
Start: 2024-10-22 | End: 2024-10-22

## 2024-10-22 RX ADMIN — ACYCLOVIR SODIUM 520 MG: 50 INJECTION, SOLUTION INTRAVENOUS at 11:10

## 2024-10-22 RX ADMIN — ACYCLOVIR SODIUM 520 MG: 50 INJECTION, SOLUTION INTRAVENOUS at 02:10

## 2024-10-22 RX ADMIN — ACETAMINOPHEN 650 MG: 325 TABLET ORAL at 08:10

## 2024-10-22 RX ADMIN — GADOBUTROL 10 ML: 604.72 INJECTION INTRAVENOUS at 09:10

## 2024-10-22 RX ADMIN — METHYLPREDNISOLONE SODIUM SUCCINATE 1000 MG: 1 INJECTION INTRAMUSCULAR; INTRAVENOUS at 12:10

## 2024-10-22 NOTE — ASSESSMENT & PLAN NOTE
Dalia Collado is a 34 year old female who presents for optic neuritis. She has pain localized in the left eye that is exacerbated when looking to the left and minimal left nasal visual field defect. MRI with L optic neuritic and R FLAIR temporal hyperintensity that could be incidental vs. Inflammatory process; case reviewed with radiology. Recommend additional work up to rule out other possible demyelinating lesions and to perform LP after MRI for CSF studies.     10/22: MRI cervical/thoracic without acute demyelinating lesions. Spondylosis at C5-C6. LP performed this afternoon and CSF samples sent to lab.    Recommendations:  - Continue IV solumedrol x 5 days, day 2  - IV acyclovir, day 2  - LP completed this afternoon; pending csf studies   - Advised patient to lay flat for minimum of two hours and to stay hydrated  - Added meningitis-encephalitis panel and additional serum encephalopathy panel  - Perform Spot EEG tomorrow   - Will follow up

## 2024-10-22 NOTE — HOSPITAL COURSE
Transferred to [facility] and admitted to hospital medicine. Neurology and ophthalmology consulted. Started on pulse dose steroids. Started on acyclovir for temporal enhancement of uncertain etiology.

## 2024-10-22 NOTE — SUBJECTIVE & OBJECTIVE
Interval History: No acute events. MRI with No focal cord signal abnormalities or abnormal enhancement in the cervicothoracic spine.   D3 of solumedrol    Review of Systems  Objective:     Vital Signs (Most Recent):  Temp: 98.1 °F (36.7 °C) (10/22/24 1146)  Pulse: 90 (10/22/24 1146)  Resp: 18 (10/22/24 1146)  BP: 128/79 (10/22/24 1146)  SpO2: 97 % (10/22/24 1146) Vital Signs (24h Range):  Temp:  [97.5 °F (36.4 °C)-98.1 °F (36.7 °C)] 98.1 °F (36.7 °C)  Pulse:  [] 90  Resp:  [16-18] 18  SpO2:  [94 %-99 %] 97 %  BP: ()/(52-89) 128/79     Weight: 110.2 kg (242 lb 15.2 oz)  Body mass index is 43.04 kg/m².    Intake/Output Summary (Last 24 hours) at 10/22/2024 1214  Last data filed at 10/21/2024 1838  Gross per 24 hour   Intake 542.57 ml   Output --   Net 542.57 ml         Physical Exam  Vitals and nursing note reviewed.   Constitutional:       General: She is not in acute distress.     Appearance: She is well-developed. She is obese.   HENT:      Head: Normocephalic and atraumatic.      Mouth/Throat:      Pharynx: No oropharyngeal exudate.   Eyes:      General: No scleral icterus.     Extraocular Movements: Extraocular movements intact.      Conjunctiva/sclera: Conjunctivae normal.      Comments: Pain with eye movement   Cardiovascular:      Rate and Rhythm: Normal rate and regular rhythm.      Heart sounds: Normal heart sounds.   Pulmonary:      Effort: Pulmonary effort is normal. No respiratory distress.      Breath sounds: Normal breath sounds. No wheezing.   Abdominal:      General: Bowel sounds are normal. There is no distension.      Palpations: Abdomen is soft.      Tenderness: There is no abdominal tenderness.   Musculoskeletal:         General: No tenderness. Normal range of motion.      Cervical back: Normal range of motion and neck supple.   Lymphadenopathy:      Cervical: No cervical adenopathy.   Skin:     General: Skin is warm and dry.      Capillary Refill: Capillary refill takes less than 2  seconds.      Findings: No rash.   Neurological:      Mental Status: She is alert and oriented to person, place, and time.      Cranial Nerves: No cranial nerve deficit.      Sensory: No sensory deficit.      Coordination: Coordination normal.   Psychiatric:         Judgment: Judgment normal.             Significant Labs: All pertinent labs within the past 24 hours have been reviewed.    Significant Imaging: I have reviewed all pertinent imaging results/findings within the past 24 hours.

## 2024-10-22 NOTE — PROCEDURES
"Dalia Collado is a 34 y.o. female patient  presents for optic neuritis. She has pain localized in the left eye that is exacerbated when looking to the left and minimal left nasal visual field defect. MRI with L optic neuritic and R FLAIR temporal hyperintensity that could be incidental vs. Inflammatory process; case reviewed with radiology. Recommend additional work up to rule out other possible demyelinating lesions and to perform LP after MRI for CSF studies.     Temp: 97.7 °F (36.5 °C) (10/22/24 1642)  Pulse: 74 (10/22/24 1642)  Resp: 18 (10/22/24 1642)  BP: 122/75 (10/22/24 1642)  SpO2: 97 % (10/22/24 1642)  Weight: 110.2 kg (242 lb 15.2 oz) (10/21/24 0830)  Height: 5' 3" (160 cm) (10/21/24 0830)       Lumbar Puncture    Date/Time: 10/22/2024 6:25 PM  Location procedure was performed: [facility] NEUROLOGY    Performed by: Douglas You MD  Authorized by: Douglas You MD  Assisting provider: Juancarlos Zambrano MD  Pre-operative diagnosis:  Optic Neuritis  Post-operative diagnosis: Optic Neuritis  Consent Done: Yes  Indications: evaluation for infection  Anesthesia: local infiltration    Anesthesia:  Local Anesthetic: lidocaine 1% without epinephrine  Description of findings: na   Lumbar space: L3-L4 interspace  Patient's position: right lateral decubitus  Needle gauge: 18  Needle type: spinal needle - Quincke tip  Needle length: 3.5 in  Number of attempts: 3  Tubes of fluid: 4  Total volume: 21 ml  Post-procedure: site cleaned, pressure dressing applied and adhesive bandage applied  Technical procedures used: na  Significant surgical tasks conducted by the assistant(s): na  Complications: No  Estimated blood loss (mL): 0.5  Specimens: No  Implants: No        10/22/2024    "

## 2024-10-22 NOTE — PROGRESS NOTES
"[facility] - Internal Medicine [facility] Medicine  Progress Note    Patient Name: Dalia Collado  MRN: [id number]  Patient Class: IP- Inpatient   Admission Date: 10/20/2024  Length of Stay: 1 days  Attending Physician: Jose Angel Chaney*  Primary Care Provider: Chelsea, Primary Doctor        Subjective:     Principal Problem:Optic neuritis        HPI:  Dalia Collado is a 34 y.o. female with no significant PMHx who [place] [facility] [place] [facility] from evaluation of optic neuritis. Patient reports left eye pain began about 5 days ago. Pain is sharp and severe with eye movement, especially when looking to the left. She also feels pressure in her head when she lays down for the past few days. She reports vision changes described as a dark curtain over part of her visual field in the left eye began yesterday when she was outside in the bright sunlight. Says it's like she's looking through a dark film. Says the "dark curtain" vision changes only occur when in light so she's been sitting inside in darkness since yesterday. She has blurry vision since her eyes were dialted by ophthalmology at [facility], but denies any blurry vision prior to this. Additioanlly, she reports mild tingling sensation in her bilteral upper extremities from her elbow to her fingertips when she lays in certain positions for the past few days. Says it feels similar to when your arm or leg "falls asleep". She reports occasional tension headaches which typically resolves with aspirin or tylenol for many years. No thunderclap headaches. Denies any weakness, confusion, new neck/back pain, fever, chills, SOB or syncope.     Patient was seen at [facility] where an MRI showed asymmetric T2/FLAIR hyperintensity within the medial right temporal lobe and concern for  optic neuritis. She was sent here for ophthalmology and neuro eval. In the ED, AFVSS. No leukocytosis of electrolyte abnormalities. Given IV toradol.     Overview/Hospital " Course:  Transferred to [facility] [place] and admitted to hospital medicine. Neurology and ophthalmology consulted. Started on pulse dose steroids. Started on acyclovir for temporal enhancement of uncertain etiology.     Interval History: No acute events. MRI with No focal cord signal abnormalities or abnormal enhancement in the cervicothoracic spine.   D3 of solumedrol    Review of Systems  Objective:     Vital Signs (Most Recent):  Temp: 98.1 °F (36.7 °C) (10/22/24 1146)  Pulse: 90 (10/22/24 1146)  Resp: 18 (10/22/24 1146)  BP: 128/79 (10/22/24 1146)  SpO2: 97 % (10/22/24 1146) Vital Signs (24h Range):  Temp:  [97.5 °F (36.4 °C)-98.1 °F (36.7 °C)] 98.1 °F (36.7 °C)  Pulse:  [] 90  Resp:  [16-18] 18  SpO2:  [94 %-99 %] 97 %  BP: ()/(52-89) 128/79     Weight: 110.2 kg (242 lb 15.2 oz)  Body mass index is 43.04 kg/m².    Intake/Output Summary (Last 24 hours) at 10/22/2024 1214  Last data filed at 10/21/2024 1838  Gross per 24 hour   Intake 542.57 ml   Output --   Net 542.57 ml         Physical Exam  Vitals and nursing note reviewed.   Constitutional:       General: She is not in acute distress.     Appearance: She is well-developed. She is obese.   HENT:      Head: Normocephalic and atraumatic.      Mouth/Throat:      Pharynx: No oropharyngeal exudate.   Eyes:      General: No scleral icterus.     Extraocular Movements: Extraocular movements intact.      Conjunctiva/sclera: Conjunctivae normal.      Comments: Pain with eye movement   Cardiovascular:      Rate and Rhythm: Normal rate and regular rhythm.      Heart sounds: Normal heart sounds.   Pulmonary:      Effort: Pulmonary effort is normal. No respiratory distress.      Breath sounds: Normal breath sounds. No wheezing.   Abdominal:      General: Bowel sounds are normal. There is no distension.      Palpations: Abdomen is soft.      Tenderness: There is no abdominal tenderness.   Musculoskeletal:         General: No tenderness. Normal range of motion.      Cervical  back: Normal range of motion and neck supple.   Lymphadenopathy:      Cervical: No cervical adenopathy.   Skin:     General: Skin is warm and dry.      Capillary Refill: Capillary refill takes less than 2 seconds.      Findings: No rash.   Neurological:      Mental Status: She is alert and oriented to person, place, and time.      Cranial Nerves: No cranial nerve deficit.      Sensory: No sensory deficit.      Coordination: Coordination normal.   Psychiatric:         Judgment: Judgment normal.             Significant Labs: All pertinent labs within the past 24 hours have been reviewed.    Significant Imaging: I have reviewed all pertinent imaging results/findings within the past 24 hours.    Assessment/Plan:      * Optic neuritis  - presents with L eye pain x5 days and and dark shadow of L eye x1 day  - MRI with findings as detailed above  - opthalmology consulted; appreciate recs  - neuro consulted; recs below:   --start IVMP 1g daily x5 days  --start empiric acyclovir dosed for CNS coverage for now  --check serum CNS demyelinating disease panel (this includes both NMO and MOG- no need to order these tests separately)  --will obtain LP as next diagnostic measure  - neuro checks q4h  - MRI c/t spine No focal cord signal abnormalities or abnormal enhancement in the cervicothoracic spine.       VTE Risk Mitigation (From admission, onward)           Ordered     enoxaparin injection 40 mg  Daily         10/20/24 2210     IP VTE HIGH RISK PATIENT  Once         10/20/24 2210                    Discharge Planning   JORDEN: 10/25/2024     Code Status: Full Code   Is the patient medically ready for discharge?:     Reason for patient still in hospital (select all that apply): Patient trending condition, Treatment, and Consult recommendations  Discharge Plan A: Home        Jose Angel Chaney MD  Department of Hospital Medicine   [facility] - Internal Medicine Telemetry

## 2024-10-22 NOTE — SUBJECTIVE & OBJECTIVE
Interval History: No overnight events. No changes in vision since yesterday. MRI cervical and thoracic without acute findings. LP completed this afternoon and CSF samples sent to lab.    Past Medical History:   Diagnosis Date    Depression        Past Surgical History:   Procedure Laterality Date    TUBAL LIGATION         Review of patient's allergies indicates:  No Known Allergies    Current Neurological Medications: see below     No current facility-administered medications on file prior to encounter.     Current Outpatient Medications on File Prior to Encounter   Medication Sig    cholecalciferol, vitamin D3, 1,250 mcg (50,000 unit) capsule Take 50,000 Units by mouth every 7 days.    naproxen (NAPROSYN) 250 MG tablet Take 1 tablet (250 mg total) by mouth every meal as needed (back pain).     Family History    None       Tobacco Use    Smoking status: Never    Smokeless tobacco: Never   Substance and Sexual Activity    Alcohol use: Not on file    Drug use: Never    Sexual activity: Not on file     Review of Systems   Constitutional:  Negative for fever.   Eyes:  Positive for visual disturbance.   Respiratory:  Negative for shortness of breath.    Neurological:  Negative for tremors, seizures, weakness and headaches.   Psychiatric/Behavioral:  Negative for behavioral problems.      Objective:     Vital Signs (Most Recent):  Temp: 97.6 °F (36.4 °C) (10/22/24 0754)  Pulse: 70 (10/22/24 0755)  Resp: 16 (10/22/24 0754)  BP: (!) 93/52 (10/22/24 0755)  SpO2: (!) 94 % (10/22/24 0755) Vital Signs (24h Range):  Temp:  [97.5 °F (36.4 °C)-98 °F (36.7 °C)] 97.6 °F (36.4 °C)  Pulse:  [] 70  Resp:  [16-18] 16  SpO2:  [94 %-99 %] 94 %  BP: ()/(52-89) 93/52     Weight: 110.2 kg (242 lb 15.2 oz)  Body mass index is 43.04 kg/m².     Physical Exam  Eyes:      Pupils: Pupils are equal, round, and reactive to light.      Comments: Small area of left nasal visual field defect, EOM normal, no gaze palsy   Cardiovascular:       Rate and Rhythm: Normal rate.   Pulmonary:      Effort: Pulmonary effort is normal.   Neurological:      Mental Status: She is alert and oriented to person, place, and time.      Cranial Nerves: Cranial nerves 2-12 are intact.          NEUROLOGICAL EXAMINATION:     MENTAL STATUS   Oriented to person, place, and time.     CRANIAL NERVES   Cranial nerves II through XII intact.     CN III, IV, VI   Pupils are equal, round, and reactive to light.    MOTOR EXAM   Overall muscle tone: normal    SENSORY EXAM   Light touch normal.     GAIT AND COORDINATION     Tremor   Resting tremor: absent      Significant Labs: CBC:   Recent Labs   Lab 10/20/24  1713 10/21/24  0415 10/22/24  0443   WBC 9.10 10.22 21.10*   HGB 14.2 13.5 13.0   HCT 42.1 40.4 38.5    292 326     CMP:   Recent Labs   Lab 10/20/24  1713 10/21/24  0415 10/22/24  0443   GLU 91 177* 170*    138 140   K 4.6 3.6 4.2    106 109   CO2 28 20* 22*   BUN 8 9 10   CREATININE 0.7 0.8 0.8   CALCIUM 8.9 8.9 9.4   MG 1.9 1.9 1.9   PROT 7.0  --   --    ALBUMIN 3.9  --   --    BILITOT 0.7  --   --    ALKPHOS 114  --   --    AST 18  --   --    ALT 16  --   --    ANIONGAP 6* 12 9       Significant Imaging: I have reviewed all pertinent imaging results/findings within the past 24 hours.

## 2024-10-22 NOTE — ASSESSMENT & PLAN NOTE
- presents with L eye pain x5 days and and dark shadow of L eye x1 day  - MRI with findings as detailed above  - opthalmology consulted; appreciate recs  - neuro consulted; recs below:   --start IVMP 1g daily x5 days  --start empiric acyclovir dosed for CNS coverage for now  --check serum CNS demyelinating disease panel (this includes both NMO and MOG- no need to order these tests separately)  --will obtain LP as next diagnostic measure  - neuro checks q4h  - MRI c/t spine No focal cord signal abnormalities or abnormal enhancement in the cervicothoracic spine.

## 2024-10-22 NOTE — PROGRESS NOTES
"[facility] - Internal Medicine Telemetry  Neurology  Progress Note    Patient Name: Dalia Collado  MRN: [id number]  Admission Date: 10/20/2024  Hospital Length of Stay: 1 days  Code Status: Full Code   Attending Provider: Jose Angel Chaney*  Primary Care Physician: Chelsea, Primary Doctor   Principal Problem:Optic neuritis    HPI:   Dalia Collado is a 34 year old female with history of depression who presents to [place] for optic neuritis. She reports sharp left eye pain that started 5 days ago, worse with looking to the left. In addition, she describes a "dark curtain" vision change in the left eye that has since improved. There is reported tingling in bilateral upper extremities from the elbow to her fingertips. CTA without vessel occlusions. MRI with L optic nerve enhancement and FLAIR hypertintensity in R temporal lobe. She denies other notable family history or new medications. Given findings on imaging we recommended starting IV solumedrol and acyclovir. Temporal lobe findings can be seen with HSV encephalitis though her exam is not consistent and could also be incidental. Nonetheless additional cervical and thoracic warranted to rule out demyelinating disease elsewhere.     Overview/Hospital Course:  No notes on file      Interval History: No overnight events. No changes in vision since yesterday. MRI cervical and thoracic without acute findings. LP completed this afternoon and CSF samples sent to lab.    Past Medical History:   Diagnosis Date    Depression        Past Surgical History:   Procedure Laterality Date    TUBAL LIGATION         Review of patient's allergies indicates:  No Known Allergies    Current Neurological Medications: see below     No current facility-administered medications on file prior to encounter.     Current Outpatient Medications on File Prior to Encounter   Medication Sig    cholecalciferol, vitamin D3, 1,250 mcg (50,000 unit) capsule Take 50,000 Units by mouth every 7 " days.    naproxen (NAPROSYN) 250 MG tablet Take 1 tablet (250 mg total) by mouth every meal as needed (back pain).     Family History    None       Tobacco Use    Smoking status: Never    Smokeless tobacco: Never   Substance and Sexual Activity    Alcohol use: Not on file    Drug use: Never    Sexual activity: Not on file     Review of Systems   Constitutional:  Negative for fever.   Eyes:  Positive for visual disturbance.   Respiratory:  Negative for shortness of breath.    Neurological:  Negative for tremors, seizures, weakness and headaches.   Psychiatric/Behavioral:  Negative for behavioral problems.      Objective:     Vital Signs (Most Recent):  Temp: 97.6 °F (36.4 °C) (10/22/24 0754)  Pulse: 70 (10/22/24 0755)  Resp: 16 (10/22/24 0754)  BP: (!) 93/52 (10/22/24 0755)  SpO2: (!) 94 % (10/22/24 0755) Vital Signs (24h Range):  Temp:  [97.5 °F (36.4 °C)-98 °F (36.7 °C)] 97.6 °F (36.4 °C)  Pulse:  [] 70  Resp:  [16-18] 16  SpO2:  [94 %-99 %] 94 %  BP: ()/(52-89) 93/52     Weight: 110.2 kg (242 lb 15.2 oz)  Body mass index is 43.04 kg/m².     Physical Exam  Eyes:      Pupils: Pupils are equal, round, and reactive to light.      Comments: Small area of left nasal visual field defect, EOM normal, no gaze palsy   Cardiovascular:      Rate and Rhythm: Normal rate.   Pulmonary:      Effort: Pulmonary effort is normal.   Neurological:      Mental Status: She is alert and oriented to person, place, and time.      Cranial Nerves: Cranial nerves 2-12 are intact.          NEUROLOGICAL EXAMINATION:     MENTAL STATUS   Oriented to person, place, and time.     CRANIAL NERVES   Cranial nerves II through XII intact.     CN III, IV, VI   Pupils are equal, round, and reactive to light.    MOTOR EXAM   Overall muscle tone: normal    SENSORY EXAM   Light touch normal.     GAIT AND COORDINATION     Tremor   Resting tremor: absent      Significant Labs: CBC:   Recent Labs   Lab 10/20/24  1713 10/21/24  0415 10/22/24  9327    WBC 9.10 10.22 21.10*   HGB 14.2 13.5 13.0   HCT 42.1 40.4 38.5    292 326     CMP:   Recent Labs   Lab 10/20/24  1713 10/21/24  0415 10/22/24  0443   GLU 91 177* 170*    138 140   K 4.6 3.6 4.2    106 109   CO2 28 20* 22*   BUN 8 9 10   CREATININE 0.7 0.8 0.8   CALCIUM 8.9 8.9 9.4   MG 1.9 1.9 1.9   PROT 7.0  --   --    ALBUMIN 3.9  --   --    BILITOT 0.7  --   --    ALKPHOS 114  --   --    AST 18  --   --    ALT 16  --   --    ANIONGAP 6* 12 9       Significant Imaging: I have reviewed all pertinent imaging results/findings within the past 24 hours.  Assessment and Plan:     * Optic neuritis  Dalia Collado is a 34 year old female who presents for optic neuritis. She has pain localized in the left eye that is exacerbated when looking to the left and minimal left nasal visual field defect. MRI with L optic neuritic and R FLAIR temporal hyperintensity that could be incidental vs. Inflammatory process; case reviewed with radiology. Recommend additional work up to rule out other possible demyelinating lesions and to perform LP after MRI for CSF studies.     10/22: MRI cervical/thoracic without acute demyelinating lesions. Spondylosis at C5-C6. LP performed this afternoon and CSF samples sent to lab.    Recommendations:  - Continue IV solumedrol x 5 days, day 2  - IV acyclovir, day 2  - LP completed this afternoon; pending csf studies   - Advised patient to lay flat for minimum of two hours and to stay hydrated  - Added meningitis-encephalitis panel and additional serum encephalopathy panel  - Perform Spot EEG tomorrow   - Will follow up        VTE Risk Mitigation (From admission, onward)           Ordered     enoxaparin injection 40 mg  Daily         10/20/24 2210     IP VTE HIGH RISK PATIENT  Once         10/20/24 2210                    Juancarlos Zambrano MD  Neurology  [facility] - Internal Medicine Telemetry

## 2024-10-23 ENCOUNTER — DOCUMENTATION ONLY (OUTPATIENT)
Dept: NEUROLOGY | Facility: CLINIC | Age: 34
End: 2024-10-23
Payer: MEDICAID

## 2024-10-23 VITALS
RESPIRATION RATE: 17 BRPM | SYSTOLIC BLOOD PRESSURE: 126 MMHG | HEIGHT: 63 IN | DIASTOLIC BLOOD PRESSURE: 86 MMHG | HEART RATE: 70 BPM | TEMPERATURE: 98 F | WEIGHT: 242.94 LBS | BODY MASS INDEX: 43.05 KG/M2 | OXYGEN SATURATION: 96 %

## 2024-10-23 LAB
ANION GAP SERPL CALC-SCNC: 9 MMOL/L (ref 8–16)
BASOPHILS # BLD AUTO: 0.01 K/UL (ref 0–0.2)
BASOPHILS NFR BLD: 0.1 % (ref 0–1.9)
BUN SERPL-MCNC: 15 MG/DL (ref 6–20)
CALCIUM SERPL-MCNC: 8.8 MG/DL (ref 8.7–10.5)
CCP AB SER IA-ACNC: <0.5 U/ML
CHLORIDE SERPL-SCNC: 108 MMOL/L (ref 95–110)
CO2 SERPL-SCNC: 21 MMOL/L (ref 23–29)
CREAT SERPL-MCNC: 0.8 MG/DL (ref 0.5–1.4)
CRP SERPL-MCNC: <0.3 MG/L (ref 0–8.2)
DIFFERENTIAL METHOD BLD: ABNORMAL
EOSINOPHIL # BLD AUTO: 0 K/UL (ref 0–0.5)
EOSINOPHIL NFR BLD: 0 % (ref 0–8)
ERYTHROCYTE [DISTWIDTH] IN BLOOD BY AUTOMATED COUNT: 13.1 % (ref 11.5–14.5)
ERYTHROCYTE [SEDIMENTATION RATE] IN BLOOD BY PHOTOMETRIC METHOD: 9 MM/HR (ref 0–36)
EST. GFR  (NO RACE VARIABLE): >60 ML/MIN/1.73 M^2
GLUCOSE SERPL-MCNC: 139 MG/DL (ref 70–110)
HCT VFR BLD AUTO: 39 % (ref 37–48.5)
HGB BLD-MCNC: 13.2 G/DL (ref 12–16)
IMM GRANULOCYTES # BLD AUTO: 0.12 K/UL (ref 0–0.04)
IMM GRANULOCYTES NFR BLD AUTO: 0.6 % (ref 0–0.5)
LYMPHOCYTES # BLD AUTO: 1.3 K/UL (ref 1–4.8)
LYMPHOCYTES NFR BLD: 6.5 % (ref 18–48)
MAGNESIUM SERPL-MCNC: 1.9 MG/DL (ref 1.6–2.6)
MCH RBC QN AUTO: 29.9 PG (ref 27–31)
MCHC RBC AUTO-ENTMCNC: 33.8 G/DL (ref 32–36)
MCV RBC AUTO: 88 FL (ref 82–98)
MONOCYTES # BLD AUTO: 0.8 K/UL (ref 0.3–1)
MONOCYTES NFR BLD: 4 % (ref 4–15)
NEUTROPHILS # BLD AUTO: 17.3 K/UL (ref 1.8–7.7)
NEUTROPHILS NFR BLD: 88.8 % (ref 38–73)
NRBC BLD-RTO: 0 /100 WBC
PHOSPHATE SERPL-MCNC: 2.7 MG/DL (ref 2.7–4.5)
PLATELET # BLD AUTO: 303 K/UL (ref 150–450)
PMV BLD AUTO: 10.1 FL (ref 9.2–12.9)
POTASSIUM SERPL-SCNC: 4 MMOL/L (ref 3.5–5.1)
RBC # BLD AUTO: 4.42 M/UL (ref 4–5.4)
RHEUMATOID FACT SERPL-ACNC: <13 IU/ML (ref 0–15)
SODIUM SERPL-SCNC: 138 MMOL/L (ref 136–145)
WBC # BLD AUTO: 19.46 K/UL (ref 3.9–12.7)

## 2024-10-23 PROCEDURE — 85025 COMPLETE CBC W/AUTO DIFF WBC: CPT | Performed by: PHYSICIAN ASSISTANT

## 2024-10-23 PROCEDURE — 36415 COLL VENOUS BLD VENIPUNCTURE: CPT | Performed by: PHYSICIAN ASSISTANT

## 2024-10-23 PROCEDURE — 36415 COLL VENOUS BLD VENIPUNCTURE: CPT | Performed by: STUDENT IN AN ORGANIZED HEALTH CARE EDUCATION/TRAINING PROGRAM

## 2024-10-23 PROCEDURE — 99233 SBSQ HOSP IP/OBS HIGH 50: CPT | Mod: ,,, | Performed by: STUDENT IN AN ORGANIZED HEALTH CARE EDUCATION/TRAINING PROGRAM

## 2024-10-23 PROCEDURE — 86140 C-REACTIVE PROTEIN: CPT | Performed by: STUDENT IN AN ORGANIZED HEALTH CARE EDUCATION/TRAINING PROGRAM

## 2024-10-23 PROCEDURE — 86225 DNA ANTIBODY NATIVE: CPT | Performed by: STUDENT IN AN ORGANIZED HEALTH CARE EDUCATION/TRAINING PROGRAM

## 2024-10-23 PROCEDURE — 86431 RHEUMATOID FACTOR QUANT: CPT | Performed by: STUDENT IN AN ORGANIZED HEALTH CARE EDUCATION/TRAINING PROGRAM

## 2024-10-23 PROCEDURE — 83735 ASSAY OF MAGNESIUM: CPT | Performed by: PHYSICIAN ASSISTANT

## 2024-10-23 PROCEDURE — 86146 BETA-2 GLYCOPROTEIN ANTIBODY: CPT | Performed by: STUDENT IN AN ORGANIZED HEALTH CARE EDUCATION/TRAINING PROGRAM

## 2024-10-23 PROCEDURE — 86038 ANTINUCLEAR ANTIBODIES: CPT | Performed by: STUDENT IN AN ORGANIZED HEALTH CARE EDUCATION/TRAINING PROGRAM

## 2024-10-23 PROCEDURE — 25000003 PHARM REV CODE 250

## 2024-10-23 PROCEDURE — 95813 EEG EXTND MNTR 61-119 MIN: CPT

## 2024-10-23 PROCEDURE — 84100 ASSAY OF PHOSPHORUS: CPT | Performed by: PHYSICIAN ASSISTANT

## 2024-10-23 PROCEDURE — 85652 RBC SED RATE AUTOMATED: CPT | Performed by: STUDENT IN AN ORGANIZED HEALTH CARE EDUCATION/TRAINING PROGRAM

## 2024-10-23 PROCEDURE — 80048 BASIC METABOLIC PNL TOTAL CA: CPT | Performed by: PHYSICIAN ASSISTANT

## 2024-10-23 PROCEDURE — 95816 EEG AWAKE AND DROWSY: CPT | Mod: 26,,, | Performed by: PSYCHIATRY & NEUROLOGY

## 2024-10-23 PROCEDURE — 86225 DNA ANTIBODY NATIVE: CPT | Mod: 59 | Performed by: STUDENT IN AN ORGANIZED HEALTH CARE EDUCATION/TRAINING PROGRAM

## 2024-10-23 PROCEDURE — 63600175 PHARM REV CODE 636 W HCPCS

## 2024-10-23 PROCEDURE — 86200 CCP ANTIBODY: CPT | Performed by: STUDENT IN AN ORGANIZED HEALTH CARE EDUCATION/TRAINING PROGRAM

## 2024-10-23 PROCEDURE — 86147 CARDIOLIPIN ANTIBODY EA IG: CPT | Performed by: STUDENT IN AN ORGANIZED HEALTH CARE EDUCATION/TRAINING PROGRAM

## 2024-10-23 RX ORDER — METHYLPREDNISOLONE SODIUM SUCCINATE 1 G/16ML
INJECTION INTRAMUSCULAR; INTRAVENOUS
Qty: 1 EACH | Refills: 0 | Status: SHIPPED | OUTPATIENT
Start: 2024-10-23

## 2024-10-23 RX ADMIN — METHYLPREDNISOLONE SODIUM SUCCINATE 1000 MG: 1 INJECTION INTRAMUSCULAR; INTRAVENOUS at 12:10

## 2024-10-23 RX ADMIN — ACYCLOVIR SODIUM 520 MG: 50 INJECTION, SOLUTION INTRAVENOUS at 06:10

## 2024-10-23 NOTE — PLAN OF CARE
Follow up with [facility] NEUROLOGY  [facility] called to schedule Neurology appt, Lupe sent message to clinic to call patient to schedule appt. [address]  [facility] [id number]  [phone]

## 2024-10-23 NOTE — PROGRESS NOTES
"[facility] - Internal Medicine Telemetry  Neurology  Progress Note    Patient Name: Dalia Collado  MRN: [id number]  Admission Date: 10/20/2024  Hospital Length of Stay: 2 days  Code Status: Full Code   Attending Provider: Jose Angel Chaney*  Primary Care Physician: Chelsea, Primary Doctor   Principal Problem:Optic neuritis    HPI:   Dalia Collado is a 34 year old female with history of depression who presents to [place] for optic neuritis. She reports sharp left eye pain that started 5 days ago, worse with looking to the left. In addition, she describes a "dark curtain" vision change in the left eye that has since improved. There is reported tingling in bilateral upper extremities from the elbow to her fingertips. CTA without vessel occlusions. MRI with L optic nerve enhancement and FLAIR hypertintensity in R temporal lobe. She denies other notable family history or new medications. Given findings on imaging we recommended starting IV solumedrol and acyclovir. Temporal lobe findings can be seen with HSV encephalitis though her exam is not consistent and could also be incidental. Nonetheless additional cervical and thoracic warranted to rule out demyelinating disease elsewhere.     Overview/Hospital Course:  No notes on file      Interval History: LP completed yesterday afternoon. Preliminary labs non revealing, pending demyelinating work up. Acyclovir discontinued. EEG completed without focal deficits. Plan to continue remaining course of steroids outpatient in the form of a smoothie. Follow up with neurology outpatient.     Past Medical History:   Diagnosis Date    Depression        Past Surgical History:   Procedure Laterality Date    TUBAL LIGATION         Review of patient's allergies indicates:  No Known Allergies    Current Neurological Medications: see below     No current facility-administered medications on file prior to encounter.     Current Outpatient Medications on File Prior to Encounter "   Medication Sig    cholecalciferol, vitamin D3, 1,250 mcg (50,000 unit) capsule Take 50,000 Units by mouth every 7 days.    naproxen (NAPROSYN) 250 MG tablet Take 1 tablet (250 mg total) by mouth every meal as needed (back pain).     Family History    None       Tobacco Use    Smoking status: Never    Smokeless tobacco: Never   Substance and Sexual Activity    Alcohol use: Not on file    Drug use: Never    Sexual activity: Not on file     Review of Systems   Constitutional:  Negative for fever.   Eyes:  Positive for visual disturbance.   Respiratory:  Negative for shortness of breath.    Neurological:  Negative for tremors, seizures, weakness and headaches.   Psychiatric/Behavioral:  Negative for behavioral problems.      Objective:     Vital Signs (Most Recent):  Temp: 98.6 °F (37 °C) (10/23/24 1139)  Pulse: 72 (10/23/24 1149)  Resp: 18 (10/23/24 1139)  BP: 122/80 (10/23/24 1139)  SpO2: 97 % (10/23/24 1149) Vital Signs (24h Range):  Temp:  [97.7 °F (36.5 °C)-98.6 °F (37 °C)] 98.6 °F (37 °C)  Pulse:  [] 72  Resp:  [18] 18  SpO2:  [94 %-97 %] 97 %  BP: ()/(60-86) 122/80     Weight: 110.2 kg (242 lb 15.2 oz)  Body mass index is 43.04 kg/m².     Physical Exam  Eyes:      Pupils: Pupils are equal, round, and reactive to light.      Comments: Small area of left nasal visual field defect, EOM normal, no gaze palsy   Cardiovascular:      Rate and Rhythm: Normal rate.   Pulmonary:      Effort: Pulmonary effort is normal.   Neurological:      Mental Status: She is alert and oriented to person, place, and time.      Cranial Nerves: Cranial nerves 2-12 are intact.          NEUROLOGICAL EXAMINATION:     MENTAL STATUS   Oriented to person, place, and time.     CRANIAL NERVES   Cranial nerves II through XII intact.     CN III, IV, VI   Pupils are equal, round, and reactive to light.    MOTOR EXAM   Overall muscle tone: normal    SENSORY EXAM   Light touch normal.     GAIT AND COORDINATION     Tremor   Resting tremor:  absent      Significant Labs: CBC:   Recent Labs   Lab 10/22/24  0443 10/23/24  0327   WBC 21.10* 19.46*   HGB 13.0 13.2   HCT 38.5 39.0    303     CMP:   Recent Labs   Lab 10/22/24  0443 10/23/24  0327   * 139*    138   K 4.2 4.0    108   CO2 22* 21*   BUN 10 15   CREATININE 0.8 0.8   CALCIUM 9.4 8.8   MG 1.9 1.9   ANIONGAP 9 9       Significant Imaging: I have reviewed all pertinent imaging results/findings within the past 24 hours.  Assessment and Plan:     * Optic neuritis  Dalia Collado is a 34 year old female who presents for optic neuritis. She has pain localized in the left eye that is exacerbated when looking to the left and minimal left nasal visual field defect. MRI with L optic neuritic and R FLAIR temporal hyperintensity that could be incidental vs. Inflammatory process; case reviewed with radiology. Recommend additional work up to rule out other possible demyelinating lesions and to perform LP after MRI for CSF studies.     10/23: Initial LP studies non revealing. EEG without focal abnormality. Plan to continue remaining dose of steroids in smoothie form. Complete general rheumatological work up. Follow up with neurology outpatient.     Recommendations:  - IV solumedrol x 5 days, day 3 ; Complete remaining two days in smoothie form outpatient  - Discontinue acyclovir  - STEPHANIE, dsDNA, Rf, anti-ccp, ESR, CRP   - Follow up with neurology outpatient  - Signing off; reach out if additional questions         VTE Risk Mitigation (From admission, onward)           Ordered     enoxaparin injection 40 mg  Daily         10/20/24 2210     IP VTE HIGH RISK PATIENT  Once         10/20/24 2210                    Juancarlos Zambrano MD  Neurology  [facility] - Internal Medicine Telemetry

## 2024-10-23 NOTE — SUBJECTIVE & OBJECTIVE
Interval History: LP completed yesterday afternoon. Preliminary labs non revealing, pending demyelinating work up. Acyclovir discontinued. EEG completed without focal deficits. Plan to continue remaining course of steroids outpatient in the form of a smoothie. Follow up with neurology outpatient.     Past Medical History:   Diagnosis Date    Depression        Past Surgical History:   Procedure Laterality Date    TUBAL LIGATION         Review of patient's allergies indicates:  No Known Allergies    Current Neurological Medications: see below     No current facility-administered medications on file prior to encounter.     Current Outpatient Medications on File Prior to Encounter   Medication Sig    cholecalciferol, vitamin D3, 1,250 mcg (50,000 unit) capsule Take 50,000 Units by mouth every 7 days.    naproxen (NAPROSYN) 250 MG tablet Take 1 tablet (250 mg total) by mouth every meal as needed (back pain).     Family History    None       Tobacco Use    Smoking status: Never    Smokeless tobacco: Never   Substance and Sexual Activity    Alcohol use: Not on file    Drug use: Never    Sexual activity: Not on file     Review of Systems   Constitutional:  Negative for fever.   Eyes:  Positive for visual disturbance.   Respiratory:  Negative for shortness of breath.    Neurological:  Negative for tremors, seizures, weakness and headaches.   Psychiatric/Behavioral:  Negative for behavioral problems.      Objective:     Vital Signs (Most Recent):  Temp: 98.6 °F (37 °C) (10/23/24 1139)  Pulse: 72 (10/23/24 1149)  Resp: 18 (10/23/24 1139)  BP: 122/80 (10/23/24 1139)  SpO2: 97 % (10/23/24 1149) Vital Signs (24h Range):  Temp:  [97.7 °F (36.5 °C)-98.6 °F (37 °C)] 98.6 °F (37 °C)  Pulse:  [] 72  Resp:  [18] 18  SpO2:  [94 %-97 %] 97 %  BP: ()/(60-86) 122/80     Weight: 110.2 kg (242 lb 15.2 oz)  Body mass index is 43.04 kg/m².     Physical Exam  Eyes:      Pupils: Pupils are equal, round, and reactive to light.       Comments: Small area of left nasal visual field defect, EOM normal, no gaze palsy   Cardiovascular:      Rate and Rhythm: Normal rate.   Pulmonary:      Effort: Pulmonary effort is normal.   Neurological:      Mental Status: She is alert and oriented to person, place, and time.      Cranial Nerves: Cranial nerves 2-12 are intact.          NEUROLOGICAL EXAMINATION:     MENTAL STATUS   Oriented to person, place, and time.     CRANIAL NERVES   Cranial nerves II through XII intact.     CN III, IV, VI   Pupils are equal, round, and reactive to light.    MOTOR EXAM   Overall muscle tone: normal    SENSORY EXAM   Light touch normal.     GAIT AND COORDINATION     Tremor   Resting tremor: absent      Significant Labs: CBC:   Recent Labs   Lab 10/22/24  0443 10/23/24  0327   WBC 21.10* 19.46*   HGB 13.0 13.2   HCT 38.5 39.0    303     CMP:   Recent Labs   Lab 10/22/24  0443 10/23/24  0327   * 139*    138   K 4.2 4.0    108   CO2 22* 21*   BUN 10 15   CREATININE 0.8 0.8   CALCIUM 9.4 8.8   MG 1.9 1.9   ANIONGAP 9 9       Significant Imaging: I have reviewed all pertinent imaging results/findings within the past 24 hours.

## 2024-10-23 NOTE — ASSESSMENT & PLAN NOTE
Dalia Collado is a 34 year old female who presents for optic neuritis. She has pain localized in the left eye that is exacerbated when looking to the left and minimal left nasal visual field defect. MRI with L optic neuritic and R FLAIR temporal hyperintensity that could be incidental vs. Inflammatory process; case reviewed with radiology. Recommend additional work up to rule out other possible demyelinating lesions and to perform LP after MRI for CSF studies.     10/23: Initial LP studies non revealing. EEG without focal abnormality. Plan to continue remaining dose of steroids in smoothie form. Complete general rheumatological work up. Follow up with neurology outpatient.     Recommendations:  - IV solumedrol x 5 days, day 3 ; Complete remaining two days in smoothie form outpatient  - Discontinue acyclovir  - STEPHANIE, dsDNA, Rf, anti-ccp, ESR, CRP   - Follow up with neurology outpatient  - Signing off; reach out if additional questions

## 2024-10-23 NOTE — PLAN OF CARE
1358-CM notified CHW to assist with neurology follow up appointment.       Lit Larios, RN, BSN, MSN  RN Case Management  [phone]

## 2024-10-23 NOTE — PLAN OF CARE
[facility] - Internal Medicine Telemetry  Discharge Final Note    Primary Care Provider: Chelsea, Primary Doctor    Expected Discharge Date: 10/23/2024    Patient discharged to home via Papo Barber (spouse) will provide transportation.     Patient's bedside nurse and pt notified of the above.    Discharge Plan A and Plan B have been determined by review of patient's clinical status, future medical and therapeutic needs, and coverage/benefits for post-acute care in coordination with multidisciplinary team members.        Final Discharge Note (most recent)       Final Note - 10/23/24 1426          Final Note    Assessment Type Final Discharge Note     Anticipated Discharge Disposition Home or Self Care     Hospital Resources/Appts/Education Provided Provided patient/caregiver with written discharge plan information (P)         Post-Acute Status    Discharge Delays None known at this time (P)                      Important Message from Medicare             Contact Info       [facility]UR[facility]   Specialty: Neurology    [address]  [facility] [address]   Phone: [phone]       Next Steps: Follow up    Instructions: BRET called to schedule Neurology appt, Lupe sent message to clinic to call patient to schedule appt.            No future appointments.    Lit Larios, RN, BSN, MSN  RN Case Management  [phone]

## 2024-10-23 NOTE — PROGRESS NOTES
EEG Hook up  AM Check Electrodes had to be fixed.Yes    Skin Integrity: Normal   No signs of skin breakdown seen during hookup  Kamille Lane   10/23/2024 9:54 AM

## 2024-10-23 NOTE — PROCEDURES
DATE: 10/23/24    EEG NUMBER:       REFERRING PHYSICIAN:  Dr. Adan      This EEG was performed to assess for evidence of underlying epilepsy.     ELECTROENCEPHALOGRAM REPORT     METHODOLOGY:  Electroencephalographic (EEG) recording is with electrodes placed according to the International 10-20 placement system.  Thirty two (32) channels of digital signal are simultaneously recorded from the scalp and may include EKG, EMG, and/or eye monitors.   Recording band pass was 0.1 to 512 hz.  Digital video recording of the patient is simultaneously recorded with the EEG.  The staff report clinical symptoms and may press an event button when the patient has symptoms of clinical interest to the treating physicians.  EEG and video recording is stored and archived in digital format.  The entire recording is visually reviewed, and the times identified by computer analysis as being spikes or seizures are reviewed again.  Activation procedures which include photic stimulation, hyperventilation and instructing patients to perform simple task are done in selected patients.   Compresses spectral analysis (CSA) is also performed on the activity recorded from each individual channel.  This is displayed as a power display of frequencies from 0 to 30 Hz over time.   The CSA analysis is done and displayed continuously.  This is reviewed for asymmetries in power between homologous areas of the scalp and for presence of changes in power which can be seen when seizures occur.  Sections of suspected abnormalities on the CSA is then compared with the original EEG recording.                [employer] software was also utilized in the review of this study.  This software suite analyzes the EEG recording in multiple domains.  Coherence and rhythmicity is computed to identify EEG sections which may contain organized seizures.  Each channel undergoes analysis to detect presence of spike and sharp waves which have special and morphological  characteristic of epileptic activity.  The routine EEG recording is converted from spacial into frequency domain.  This is then displayed comparing homologous areas to identify areas of significant asymmetry.  Algorithm to identify non-cortically generated artifact is used to separate eye movement, EMG and other artifact from the EEG.     EEG FINDINGS:  The recording was obtained with a number of standard bipolar and referential montages during wakefulness, drowsiness.  In the alert state, the posterior background rhythm was a symmetric, well-modulated 9 to 10 Hz alpha rhythm, which reacted symmetrically to eye opening.  Activation procedures were not performed  During drowsiness, the background rhythm waxed and waned and there were periods of slowing. There were no focal abnormalities.  There were no interictal epileptiform abnormalities and no clinical or electrographic seizures were recorded.    The EKG channel revealed a sinus rhythm.     IMPRESSION:  This is a normal EEG during wakefulness, drowsiness     CLINICAL CORRELATION:  The patient is a  34-year-old male who is being evaluated for altered sensorium.  The patient is currently not maintained on any anti-seizure medications.  This is a normal EEG during wakefulness, drowsiness.  There is no evidence for neither cortical dysfunction nor an epileptic process on this recording.  No seizures were recorded during this study.

## 2024-10-24 LAB
ANA SER QL IF: NORMAL
DNA TITER: 0
DSDNA AB SER-ACNC: NORMAL [IU]/ML
KAPPA LC FREE CSF-MCNC: <0.0083 MG/DL

## 2024-10-26 LAB
BACTERIA CSF CULT: NORMAL
GRAM STN SPEC: NORMAL

## 2024-10-28 DIAGNOSIS — H46.9 OPTIC NEURITIS: Primary | ICD-10-CM

## 2024-11-18 ENCOUNTER — TELEPHONE (OUTPATIENT)
Dept: OPHTHALMOLOGY | Facility: CLINIC | Age: 34
End: 2024-11-18
Payer: MEDICAID

## 2024-11-18 NOTE — TELEPHONE ENCOUNTER
----- Message from Glennraymond sent at 11/18/2024  2:14 PM CST -----  Regarding: appt  Type:  Sooner Apoointment Request      Name of Caller:pt     When is the first available appointment?dept booked     Symptoms:vision gone in right eye/ blurry vision in left eye       Best Call Back Number:[phone]      Additional Information: pt is looking to get schedule asap.   please call to discuss.

## 2024-11-19 ENCOUNTER — OFFICE VISIT (OUTPATIENT)
Dept: OPHTHALMOLOGY | Facility: CLINIC | Age: 34
End: 2024-11-19
Payer: MEDICAID

## 2024-11-19 ENCOUNTER — HOSPITAL ENCOUNTER (INPATIENT)
Facility: HOSPITAL | Age: 34
LOS: 3 days | Discharge: HOME | DRG: 059 | End: 2024-11-22
Attending: EMERGENCY MEDICINE | Admitting: INTERNAL MEDICINE
Payer: MEDICAID

## 2024-11-19 DIAGNOSIS — H46.9 OPTIC NEURITIS: Primary | ICD-10-CM

## 2024-11-19 DIAGNOSIS — R00.0 TACHYCARDIA: ICD-10-CM

## 2024-11-19 DIAGNOSIS — R07.9 CHEST PAIN: ICD-10-CM

## 2024-11-19 DIAGNOSIS — H53.9 VISUAL DISTURBANCE: ICD-10-CM

## 2024-11-19 DIAGNOSIS — G37.81 MYELIN OLIGODENDROCYTE GLYCOPROTEIN ANTIBODY DISORDER (MOGAD): ICD-10-CM

## 2024-11-19 PROBLEM — E66.01 MORBID OBESITY: Status: ACTIVE | Noted: 2024-11-19

## 2024-11-19 LAB
ALBUMIN SERPL BCP-MCNC: 4.1 G/DL (ref 3.5–5.2)
ALP SERPL-CCNC: 126 U/L (ref 55–135)
ALT SERPL W/O P-5'-P-CCNC: 21 U/L (ref 10–44)
ANION GAP SERPL CALC-SCNC: 7 MMOL/L (ref 8–16)
AST SERPL-CCNC: 14 U/L (ref 10–40)
BASOPHILS # BLD AUTO: 0.06 K/UL (ref 0–0.2)
BASOPHILS NFR BLD: 0.7 % (ref 0–1.9)
BILIRUB SERPL-MCNC: 0.8 MG/DL (ref 0.1–1)
BUN SERPL-MCNC: 7 MG/DL (ref 6–20)
CALCIUM SERPL-MCNC: 9.4 MG/DL (ref 8.7–10.5)
CHLORIDE SERPL-SCNC: 105 MMOL/L (ref 95–110)
CO2 SERPL-SCNC: 27 MMOL/L (ref 23–29)
CREAT SERPL-MCNC: 0.7 MG/DL (ref 0.5–1.4)
CRP SERPL-MCNC: 0.2 MG/DL
DIFFERENTIAL METHOD BLD: NORMAL
EOSINOPHIL # BLD AUTO: 0.2 K/UL (ref 0–0.5)
EOSINOPHIL NFR BLD: 2.1 % (ref 0–8)
ERYTHROCYTE [DISTWIDTH] IN BLOOD BY AUTOMATED COUNT: 13.3 % (ref 11.5–14.5)
ERYTHROCYTE [SEDIMENTATION RATE] IN BLOOD BY WESTERGREN METHOD: 4 MM/HR (ref 0–20)
EST. GFR  (NO RACE VARIABLE): >60 ML/MIN/1.73 M^2
GLUCOSE SERPL-MCNC: 103 MG/DL (ref 70–110)
HCT VFR BLD AUTO: 39.4 % (ref 37–48.5)
HGB BLD-MCNC: 13.4 G/DL (ref 12–16)
IMM GRANULOCYTES # BLD AUTO: 0.02 K/UL (ref 0–0.04)
IMM GRANULOCYTES NFR BLD AUTO: 0.2 % (ref 0–0.5)
LYMPHOCYTES # BLD AUTO: 2 K/UL (ref 1–4.8)
LYMPHOCYTES NFR BLD: 24.3 % (ref 18–48)
MCH RBC QN AUTO: 30.5 PG (ref 27–31)
MCHC RBC AUTO-ENTMCNC: 34 G/DL (ref 32–36)
MCV RBC AUTO: 90 FL (ref 82–98)
MONOCYTES # BLD AUTO: 0.7 K/UL (ref 0.3–1)
MONOCYTES NFR BLD: 8.2 % (ref 4–15)
NEUTROPHILS # BLD AUTO: 5.4 K/UL (ref 1.8–7.7)
NEUTROPHILS NFR BLD: 64.5 % (ref 38–73)
NRBC BLD-RTO: 0 /100 WBC
PLATELET # BLD AUTO: 364 K/UL (ref 150–450)
PMV BLD AUTO: 10.5 FL (ref 9.2–12.9)
POTASSIUM SERPL-SCNC: 3.9 MMOL/L (ref 3.5–5.1)
PROT SERPL-MCNC: 7 G/DL (ref 6–8.4)
RBC # BLD AUTO: 4.39 M/UL (ref 4–5.4)
SODIUM SERPL-SCNC: 139 MMOL/L (ref 136–145)
WBC # BLD AUTO: 8.41 K/UL (ref 3.9–12.7)

## 2024-11-19 PROCEDURE — 86140 C-REACTIVE PROTEIN: CPT | Performed by: EMERGENCY MEDICINE

## 2024-11-19 PROCEDURE — A9585 GADOBUTROL INJECTION: HCPCS | Performed by: INTERNAL MEDICINE

## 2024-11-19 PROCEDURE — 1159F MED LIST DOCD IN RCRD: CPT | Mod: CPTII,,, | Performed by: OPHTHALMOLOGY

## 2024-11-19 PROCEDURE — 99212 OFFICE O/P EST SF 10 MIN: CPT | Mod: PBBFAC,PO | Performed by: OPHTHALMOLOGY

## 2024-11-19 PROCEDURE — 92133 CPTRZD OPH DX IMG PST SGM ON: CPT | Mod: PBBFAC,PO | Performed by: OPHTHALMOLOGY

## 2024-11-19 PROCEDURE — 99214 OFFICE O/P EST MOD 30 MIN: CPT | Mod: S$PBB,,, | Performed by: OPHTHALMOLOGY

## 2024-11-19 PROCEDURE — 63600175 PHARM REV CODE 636 W HCPCS: Performed by: EMERGENCY MEDICINE

## 2024-11-19 PROCEDURE — 12000002 HC ACUTE/MED SURGE SEMI-PRIVATE ROOM

## 2024-11-19 PROCEDURE — 99285 EMERGENCY DEPT VISIT HI MDM: CPT | Mod: 25

## 2024-11-19 PROCEDURE — 25000003 PHARM REV CODE 250: Performed by: INTERNAL MEDICINE

## 2024-11-19 PROCEDURE — 99999 PR PBB SHADOW E&M-EST. PATIENT-LVL II: CPT | Mod: PBBFAC,,, | Performed by: OPHTHALMOLOGY

## 2024-11-19 PROCEDURE — 25000003 PHARM REV CODE 250: Performed by: EMERGENCY MEDICINE

## 2024-11-19 PROCEDURE — 85025 COMPLETE CBC W/AUTO DIFF WBC: CPT | Performed by: EMERGENCY MEDICINE

## 2024-11-19 PROCEDURE — 85651 RBC SED RATE NONAUTOMATED: CPT | Performed by: EMERGENCY MEDICINE

## 2024-11-19 PROCEDURE — 25500020 PHARM REV CODE 255: Performed by: INTERNAL MEDICINE

## 2024-11-19 PROCEDURE — 1111F DSCHRG MED/CURRENT MED MERGE: CPT | Mod: CPTII,,, | Performed by: OPHTHALMOLOGY

## 2024-11-19 PROCEDURE — 80053 COMPREHEN METABOLIC PANEL: CPT | Performed by: EMERGENCY MEDICINE

## 2024-11-19 PROCEDURE — 1160F RVW MEDS BY RX/DR IN RCRD: CPT | Mod: CPTII,,, | Performed by: OPHTHALMOLOGY

## 2024-11-19 RX ORDER — SODIUM,POTASSIUM PHOSPHATES 280-250MG
2 POWDER IN PACKET (EA) ORAL
Status: DISCONTINUED | OUTPATIENT
Start: 2024-11-19 | End: 2024-11-22 | Stop reason: HOSPADM

## 2024-11-19 RX ORDER — ACETAMINOPHEN 325 MG/1
650 TABLET ORAL EVERY 8 HOURS PRN
Status: DISCONTINUED | OUTPATIENT
Start: 2024-11-19 | End: 2024-11-22 | Stop reason: HOSPADM

## 2024-11-19 RX ORDER — FAMOTIDINE 20 MG/1
20 TABLET, FILM COATED ORAL 2 TIMES DAILY
Status: DISCONTINUED | OUTPATIENT
Start: 2024-11-19 | End: 2024-11-22 | Stop reason: HOSPADM

## 2024-11-19 RX ORDER — HYDROCODONE BITARTRATE AND ACETAMINOPHEN 5; 325 MG/1; MG/1
1 TABLET ORAL EVERY 6 HOURS PRN
Status: DISCONTINUED | OUTPATIENT
Start: 2024-11-19 | End: 2024-11-22 | Stop reason: HOSPADM

## 2024-11-19 RX ORDER — ALUMINUM HYDROXIDE, MAGNESIUM HYDROXIDE, AND SIMETHICONE 1200; 120; 1200 MG/30ML; MG/30ML; MG/30ML
30 SUSPENSION ORAL 4 TIMES DAILY PRN
Status: DISCONTINUED | OUTPATIENT
Start: 2024-11-19 | End: 2024-11-22 | Stop reason: HOSPADM

## 2024-11-19 RX ORDER — GADOBUTROL 604.72 MG/ML
10 INJECTION INTRAVENOUS
Status: COMPLETED | OUTPATIENT
Start: 2024-11-19 | End: 2024-11-19

## 2024-11-19 RX ORDER — LANOLIN ALCOHOL/MO/W.PET/CERES
800 CREAM (GRAM) TOPICAL
Status: DISCONTINUED | OUTPATIENT
Start: 2024-11-19 | End: 2024-11-22 | Stop reason: HOSPADM

## 2024-11-19 RX ORDER — ONDANSETRON HYDROCHLORIDE 2 MG/ML
4 INJECTION, SOLUTION INTRAVENOUS EVERY 6 HOURS PRN
Status: DISCONTINUED | OUTPATIENT
Start: 2024-11-19 | End: 2024-11-22 | Stop reason: HOSPADM

## 2024-11-19 RX ORDER — TALC
6 POWDER (GRAM) TOPICAL NIGHTLY PRN
Status: DISCONTINUED | OUTPATIENT
Start: 2024-11-19 | End: 2024-11-22 | Stop reason: HOSPADM

## 2024-11-19 RX ORDER — ACETAMINOPHEN 325 MG/1
650 TABLET ORAL EVERY 4 HOURS PRN
Status: DISCONTINUED | OUTPATIENT
Start: 2024-11-19 | End: 2024-11-22 | Stop reason: HOSPADM

## 2024-11-19 RX ORDER — NALOXONE HCL 0.4 MG/ML
0.02 VIAL (ML) INJECTION
Status: DISCONTINUED | OUTPATIENT
Start: 2024-11-19 | End: 2024-11-22 | Stop reason: HOSPADM

## 2024-11-19 RX ADMIN — GADOBUTROL 10 ML: 604.72 INJECTION INTRAVENOUS at 09:11

## 2024-11-19 RX ADMIN — METHYLPREDNISOLONE SODIUM SUCCINATE 1000 MG: 500 INJECTION INTRAMUSCULAR; INTRAVENOUS at 08:11

## 2024-11-19 RX ADMIN — FAMOTIDINE 20 MG: 20 TABLET ORAL at 10:11

## 2024-11-19 NOTE — PROGRESS NOTES
HPI    New pt here for black out vision OD x 6 days. States OD is getting really   dim and blurry. Worse since yesterday .     Pain OD. Feels pressure and pain in upper park of eyes.     No gtts.       States went to hospital on 10/20 with dimming vision. States was in pt for   a few day. Given IV steroids. MRI, CT and lumbar puncture done. Given dx   of optic neuritis.  was told to f/u with neuro opht. States hasn't   been able to make appt yet.     States after leaving hospital OD vision started getting dimmer and darker,   states can no longer see anything OD. States has been about 6 days. Pt   states OS is now getting dimmer.       States sent home with methylprednisolone syringe to take with smoothie. Pt   states did take medication     No gtts.     Last edited by Katelynn Jones on 11/19/2024  2:25 PM.            Assessment /Plan     For exam results, see Encounter Report.    Optic neuritis    Myelin oligodendrocyte glycoprotein antibody disorder (MOGAD)      Pt with known hx of optic neuritis OS, s/p admission and workup last month with neurology  MOGAD+  Today she presents with LP vision OD, 20/400 vision OS with optic neuritis OD>OS  She has been off steroids for nearly a month, has not yet seen neurology as outpatient    Discussed risk of permanent vision loss, need for urgent steroid treatment  Discussed case with Dr. Rdz with Neurology, who recommended admission for IV pulse steroids  Pt can't make it to [facility], will present to [facility] ED for admission/neurology/IV steroids

## 2024-11-20 PROBLEM — R00.0 TACHYCARDIA: Status: ACTIVE | Noted: 2024-11-20

## 2024-11-20 LAB
ALBUMIN SERPL BCP-MCNC: 4.1 G/DL (ref 3.5–5.2)
ALP SERPL-CCNC: 122 U/L (ref 55–135)
ALT SERPL W/O P-5'-P-CCNC: 18 U/L (ref 10–44)
ANION GAP SERPL CALC-SCNC: 9 MMOL/L (ref 8–16)
AST SERPL-CCNC: 11 U/L (ref 10–40)
BASOPHILS # BLD AUTO: 0.01 K/UL (ref 0–0.2)
BASOPHILS NFR BLD: 0.1 % (ref 0–1.9)
BILIRUB SERPL-MCNC: 0.6 MG/DL (ref 0.1–1)
BUN SERPL-MCNC: 10 MG/DL (ref 6–20)
CALCIUM SERPL-MCNC: 9.7 MG/DL (ref 8.7–10.5)
CHLORIDE SERPL-SCNC: 109 MMOL/L (ref 95–110)
CO2 SERPL-SCNC: 20 MMOL/L (ref 23–29)
CREAT SERPL-MCNC: 0.7 MG/DL (ref 0.5–1.4)
DIFFERENTIAL METHOD BLD: ABNORMAL
EOSINOPHIL # BLD AUTO: 0 K/UL (ref 0–0.5)
EOSINOPHIL NFR BLD: 0 % (ref 0–8)
ERYTHROCYTE [DISTWIDTH] IN BLOOD BY AUTOMATED COUNT: 13.1 % (ref 11.5–14.5)
EST. GFR  (NO RACE VARIABLE): >60 ML/MIN/1.73 M^2
GLUCOSE SERPL-MCNC: 195 MG/DL (ref 70–110)
HCT VFR BLD AUTO: 40.2 % (ref 37–48.5)
HGB BLD-MCNC: 13.9 G/DL (ref 12–16)
IMM GRANULOCYTES # BLD AUTO: 0.09 K/UL (ref 0–0.04)
IMM GRANULOCYTES NFR BLD AUTO: 0.6 % (ref 0–0.5)
LYMPHOCYTES # BLD AUTO: 1.1 K/UL (ref 1–4.8)
LYMPHOCYTES NFR BLD: 7.7 % (ref 18–48)
MAGNESIUM SERPL-MCNC: 1.8 MG/DL (ref 1.6–2.6)
MCH RBC QN AUTO: 30.5 PG (ref 27–31)
MCHC RBC AUTO-ENTMCNC: 34.6 G/DL (ref 32–36)
MCV RBC AUTO: 88 FL (ref 82–98)
MONOCYTES # BLD AUTO: 0.3 K/UL (ref 0.3–1)
MONOCYTES NFR BLD: 2 % (ref 4–15)
NEUTROPHILS # BLD AUTO: 13.2 K/UL (ref 1.8–7.7)
NEUTROPHILS NFR BLD: 89.6 % (ref 38–73)
NRBC BLD-RTO: 0 /100 WBC
PLATELET # BLD AUTO: 350 K/UL (ref 150–450)
PMV BLD AUTO: 9.5 FL (ref 9.2–12.9)
POTASSIUM SERPL-SCNC: 4 MMOL/L (ref 3.5–5.1)
PROT SERPL-MCNC: 7.2 G/DL (ref 6–8.4)
RBC # BLD AUTO: 4.56 M/UL (ref 4–5.4)
SODIUM SERPL-SCNC: 138 MMOL/L (ref 136–145)
TSH SERPL DL<=0.005 MIU/L-ACNC: 1.05 UIU/ML (ref 0.34–5.6)
WBC # BLD AUTO: 14.69 K/UL (ref 3.9–12.7)

## 2024-11-20 PROCEDURE — 99900031 HC PATIENT EDUCATION (STAT)

## 2024-11-20 PROCEDURE — 25000003 PHARM REV CODE 250: Performed by: INTERNAL MEDICINE

## 2024-11-20 PROCEDURE — 63600175 PHARM REV CODE 636 W HCPCS: Performed by: INTERNAL MEDICINE

## 2024-11-20 PROCEDURE — 80053 COMPREHEN METABOLIC PANEL: CPT | Performed by: INTERNAL MEDICINE

## 2024-11-20 PROCEDURE — 94761 N-INVAS EAR/PLS OXIMETRY MLT: CPT

## 2024-11-20 PROCEDURE — 12000002 HC ACUTE/MED SURGE SEMI-PRIVATE ROOM

## 2024-11-20 PROCEDURE — 84443 ASSAY THYROID STIM HORMONE: CPT | Performed by: INTERNAL MEDICINE

## 2024-11-20 PROCEDURE — 93005 ELECTROCARDIOGRAM TRACING: CPT | Performed by: INTERNAL MEDICINE

## 2024-11-20 PROCEDURE — 93010 ELECTROCARDIOGRAM REPORT: CPT | Mod: ,,, | Performed by: INTERNAL MEDICINE

## 2024-11-20 PROCEDURE — 99900035 HC TECH TIME PER 15 MIN (STAT)

## 2024-11-20 PROCEDURE — 83735 ASSAY OF MAGNESIUM: CPT | Performed by: INTERNAL MEDICINE

## 2024-11-20 PROCEDURE — 94799 UNLISTED PULMONARY SVC/PX: CPT

## 2024-11-20 PROCEDURE — 85025 COMPLETE CBC W/AUTO DIFF WBC: CPT | Performed by: INTERNAL MEDICINE

## 2024-11-20 RX ADMIN — FAMOTIDINE 20 MG: 20 TABLET ORAL at 09:11

## 2024-11-20 RX ADMIN — FAMOTIDINE 20 MG: 20 TABLET ORAL at 08:11

## 2024-11-20 RX ADMIN — DEXTROSE MONOHYDRATE 1000 MG: 50 INJECTION, SOLUTION INTRAVENOUS at 11:11

## 2024-11-20 NOTE — HPI
24 year old pt getting admitted with optic neuritis  On October she was diagnosed with optic neuritis on L eye  She was admitted in [facility] [address] and had MRI/LP and ws started on iv steroids  She did well for about a month  Later she staretd having vision problems on R eye   She saw an Ophthalmologist  today and advised to come to ER and got admitted

## 2024-11-20 NOTE — CONSULTS
[facility]  Department of Neurology  Neurology Consultation Note        PATIENT NAME: Dalia Collado  MRN: [id number]  CSN: [id number]      TODAY'S DATE: 11/20/2024  ADMIT DATE: 11/19/2024                            CONSULTING PROVIDER: Blake Rdz MD  CONSULT REQUESTED BY: Tarik Rodrigez MD      Reason for consult:  Optic neuritis      History obtained from chart review and the patient.    HPI: Dalia Collado is a 34 y.o. female with a history of optic neuritis, recently diagnosed MOGAD, presents to the hospital with right optic neuritis.      Patient had decreased vision in her right eye associated with painful eye movements started about 8 days ago and also worsening vision in the left eye for the past 4-5 days for which she presented to the ophthalmologist office and was diagnosed with optic neuritis of the right eye and was recommended to go to the ER.    Patient was admitted to [facility] about a month ago with left eye optic neuritis and workup with MRI brain showing FLAIR hyperintensity in the right temporal lobe without enhancement, enhancement of the left optic nerve was noted and lumbar puncture was done at that time with negative workup in CSF.  She was treated with 5 days of pulse dose steroids 1000 mg daily with some improvement in her vision pain with eye movements in left eye.  Later Serum MOG antibody titer was reported positive however does not followed up with neurology clinic sensory discharge and she is not on any immunosuppressants.    Now presented to the hospital with decreased vision in right greater than left eyes.  She denies any other associated symptoms including nausea, vomiting, gait imbalance, focal weakness or sensory changes, bowel bladder abnormalities.        PREVIOUS MEDICAL HISTORY:  Past Medical History:   Diagnosis Date    Depression      PREVIOUS SURGICAL HISTORY:  Past Surgical History:   Procedure Laterality Date    TUBAL LIGATION  "      FAMILY MEDICAL HISTORY:  Family History   Problem Relation Name Age of Onset    Arthritis Mother       ALLERGIES:  Review of patient's allergies indicates:  No Known Allergies  HOME MEDICATIONS:  Prior to Admission medications    Medication Sig Start Date End Date Taking? Authorizing Provider   methylPREDNISolone sodium succinate (SOLU-MEDROL) 1,000 mg injection Mix contents of 1 syringe smoothie for 1 dose  Patient not taking: Reported on 11/19/2024 10/23/24   Jose Angel Chaney MD     CURRENT SCHEDULED MEDICATIONS:   famotidine  20 mg Oral BID    methylPREDNISolone injection (PEDS and ADULTS)  1,000 mg Intravenous Q12H     CURRENT INFUSIONS:    CURRENT PRN MEDICATIONS:    Current Facility-Administered Medications:     acetaminophen, 650 mg, Oral, Q8H PRN    acetaminophen, 650 mg, Oral, Q4H PRN    aluminum-magnesium hydroxide-simethicone, 30 mL, Oral, QID PRN    HYDROcodone-acetaminophen, 1 tablet, Oral, Q6H PRN    magnesium oxide, 800 mg, Oral, PRN    magnesium oxide, 800 mg, Oral, PRN    melatonin, 6 mg, Oral, Nightly PRN    naloxone, 0.02 mg, Intravenous, PRN    ondansetron, 4 mg, Intravenous, Q6H PRN    potassium bicarbonate, 35 mEq, Oral, PRN    potassium bicarbonate, 50 mEq, Oral, PRN    potassium bicarbonate, 60 mEq, Oral, PRN    potassium, sodium phosphates, 2 packet, Oral, PRN    potassium, sodium phosphates, 2 packet, Oral, PRN    potassium, sodium phosphates, 2 packet, Oral, PRN    REVIEW OF SYSTEMS:  Please refer to the HPI for all pertinent positive and negative findings. A comprehensive review of all other systems was negative.    PHYSICAL EXAM:  Patient Vitals for the past 24 hrs:   BP Temp Temp src Pulse Resp SpO2 Height Weight   11/20/24 0730 114/74 97.9 °F (36.6 °C) Oral 84 18 95 % -- --   11/20/24 0703 -- -- -- 92 -- -- -- --   11/20/24 0400 (!) 97/52 97.6 °F (36.4 °C) -- 99 16 95 % -- --   11/20/24 0015 -- -- -- -- -- -- 5' 3" (1.6 m) 112 kg (246 lb 14.6 oz)   11/20/24 0007 -- " "-- -- 86 17 97 % -- --   11/19/24 2356 120/85 98.6 °F (37 °C) Oral 88 16 97 % -- --   11/19/24 2236 116/76 -- -- 86 16 96 % -- --   11/19/24 1603 (!) 134/95 98.5 °F (36.9 °C) Oral 93 16 97 % 5' 3" (1.6 m) 110 kg (242 lb 8 oz)       GENERAL APPEARANCE: Alert, well-developed, well-nourished female in no acute distress.  HEENT: Normocephalic and atraumatic. PERRL. Oropharynx unremarkable.  PULM: Normal respiratory effort. No accessory muscle use.  CV: RRR.  ABDOMEN: Soft, nontender.  EXTREMITIES: No obvious signs of vascular compromise. Pulses present. No cyanosis, clubbing or edema.  SKIN: Clear; no rashes, lesions or skin breaks in exposed areas.    NEURO:  MENTAL STATUS: Patient awake and oriented to time, place, and person, recent/remote memory normal, attention span/concentration normal, and speech fluent without paraphasic errors.  Affect euthymic.    CRANIAL NERVES:  CN I: Not tested.  CN II: Fundoscopic exam deferred.  CN III, IV, VI: Pupils equal, round and reactive to light.  Extraocular movements full and intact.  Diminished vision right greater than left  CN V: Facial sensation normal.  CN VII: Facial asymmetry absent.  CN VIII: Hearing grossly normal and equal bilaterally.  No skew deviation or pathologic nystagmus.  CN IX, X: Palate elevates symmetrically. Speech/articulation is clear without dysarthria.  CN XI: Shoulder shrug and chin rotation equal with good strength.  CN XII: Tongue protrusion midline.    MOTOR:  Bulk normal. Tone normal and symmetric throughout.  Abnormal movements absent.  Tremor: none present.  Strength 5/5 throughout.    REFLEXES:  DTRs 2+ throughout.  Plantar response downgoing bilaterally.  SENSATION: grossly intact throughout.  COORDINATION: normal finger-to-nose.  STATION: not tested.  GAIT: not tested.         Labs:  Recent Labs   Lab 11/19/24 2034      K 3.9      CO2 27   BUN 7   CREATININE 0.7      CALCIUM 9.4     Recent Labs   Lab 11/19/24 2034   WBC " "8.41   HGB 13.4   HCT 39.4        Recent Labs   Lab 11/19/24 2034   ALBUMIN 4.1   PROT 7.0   BILITOT 0.8   ALKPHOS 126   ALT 21   AST 14     No results found for: "PT", "PTT", "INR"  No results found for: "CHOLTOT", "TRIG", "HDL", "CHOLHDL", "LDL"  No results found for: "HGBA1C"  Lab Results   Component Value Date    PROTEINCSF 15 10/22/2024    GLUCCSF 99 (H) 10/22/2024       Imaging:  I have reviewed and interpreted the pertinent imaging and lab results.      MRI Brain W WO Contrast  Narrative: EXAMINATION:  MRI BRAIN W WO CONTRAST    CLINICAL HISTORY:  optic neuritis;    TECHNIQUE:  Multiplanar multisequence MR imaging of the brain was performed with and without IV contrast.    COMPARISON:  MR brain 10/20/2024.    FINDINGS:  Gray matter distinction is preserved throughout the brain parenchyma.  The ventricles are midline without mass effect. There is interval resolution of previously described FLAIR and T2 hyperintensity of the medial right temporal lobe.  No new abnormal areas of FLAIR or T2 hyperintensity.  No intra-axial hemorrhage or restricted diffusion.  No intra-axial fluid collection or mass.  There is no abnormal intracranial enhancement.  Bone marrow signal of the calvarium is within normal limits.  Major vascular flow voids are within normal limits.  The orbits globes and optic nerves are grossly unremarkable.  Paranasal sinuses are unremarkable.  Impression: Interval resolution of previously described medial right temporal lobe FLAIR T2 hyperintensity.  No acute intracranial abnormality observed.    Electronically signed by: Long Obregon  Date:    11/20/2024  Time:    07:06  MRI Cervical Spine W WO Cont  Narrative: EXAMINATION:  MRI CERVICAL SPINE W WO CONTRAST    CLINICAL HISTORY:  optic neuritis;    TECHNIQUE:  Multisequence, multiplanar MR imaging of the cervical spine with and without 10 cc Gadavist    COMPARISON:  10/22/2024    FINDINGS:  Slight reversal of the normal cervical " lordosis.    Normal bone marrow signal without fracture or aggressive marrow infiltrative process.    Normal cord signal and contour.  No abnormal enhancement.    C5-6: Mild broad-based disc bulge abuts the ventral thecal sac.  Superimposed 3 mm right posterolateral disc herniation of the protrusion type with annular fissure.  Mild central canal stenosis and mild right foraminal stenosis.  No left foraminal stenosis.    Remaining levels are unremarkable.    Unremarkable prevertebral soft tissues  Impression: No demyelinating disease.  Other findings above    Electronically signed by: Akil Walker  Date:    11/20/2024  Time:    00:03         ASSESSMENT & PLAN:      Optic neuritis  MOGAD    Plan:   Presented to the hospital with optic neuritis which likely secondary to MOGAD.  Workup during her previous hospitalization for NMO disease, MS profile has been negative.  MOG antibody titer was positive.    Treat with pulse dose steroids 1000 mg daily for 5 days.    MRI brain with and without contrast repeated now showed interval resolution of the medial temporal lobe FLAIR hyperintensity.  MRI orbits with and without contrast started and pending.    PT OT evaluate and treat   Outpatient follow-up with neuro immunologist.        Thank you kindly for including us in the care of this patient. Please do not hesitate to contact us with any questions.           Blake Rdz MD  Neurology/vascular Neurology  Date of Service: 11/20/2024  10:00 AM    --------------------------------------------------------------------------------------------------------------------------------------------------------------------------------------------------------------------------------------------------------------  Please note: This note was transcribed using voice recognition software. Because of this technology there are often uinintended grammatical, spelling, and other transcription errors. Please disregard these errors.

## 2024-11-20 NOTE — ASSESSMENT & PLAN NOTE
Body mass index is 43.74 kg/m². Morbid obesity complicates all aspects of disease management from diagnostic modalities to treatment.

## 2024-11-20 NOTE — PROGRESS NOTES
Formerly [facility] Medicine  Progress Note    Patient Name: Dalia Collado  MRN: [id number]  Patient Class: IP- Inpatient   Admission Date: 11/19/2024  Length of Stay: 1 days  Attending Physician: Tarik Rodrigez MD  Primary Care Provider: Chelsea, Primary Doctor        Subjective:     Principal Problem:Optic neuritis        HPI:  24 year old pt getting admitted with optic neuritis  On October she was diagnosed with optic neuritis on L eye  She was admitted in [facility] [address] and had MRI/LP and ws started on iv steroids  She did well for about a month  Later she staretd having vision problems on R eye   She saw an Ophthalmologist  today and advised to come to ER and got admitted     Overview/Hospital Course:  No notes on file    Interval History:  Patient awake alert and in no acute distress.  Remains afebrile.  Denies chest pain shortness for breath.  Sitting up in bed.  Complains of blurry vision worse in her right eye.  Complains of double vision in her left eye.  States when she was admitted at [facility] in October they gave her 3 days of high-dose Solu-Medrol 1000 mg IV q.8h or q.12 hours.  It helped her vision.  She was sent home with a dose of oral Solu-Medrol.  Patient denies any anxiety.  Nurses reported tachycardia with heart rate ranging from 120s 145.  Patient is currently tachycardic but appears comfortable.  Appears to be sinus tach.  Likely due to steroids and maybe a component of anxiety    Review of Systems   Constitutional:  Negative for activity change, fatigue and fever.   Eyes:  Positive for visual disturbance.   Respiratory:  Negative for cough and shortness of breath.    Cardiovascular:  Negative for chest pain and leg swelling.   Gastrointestinal:  Negative for abdominal pain, nausea and vomiting.   All other systems reviewed and are negative.    Objective:     Vital Signs (Most Recent):  Temp: 97.9 °F (36.6 °C) (11/20/24 0730)  Pulse: 84 (11/20/24 0730)  Resp: 18 (11/20/24  0730)  BP: 114/74 (11/20/24 0730)  SpO2: 95 % (11/20/24 0730) Vital Signs (24h Range):  Temp:  [97.6 °F (36.4 °C)-98.6 °F (37 °C)] 97.9 °F (36.6 °C)  Pulse:  [84-99] 84  Resp:  [16-18] 18  SpO2:  [95 %-97 %] 95 %  BP: ()/(52-95) 114/74     Weight: 112 kg (246 lb 14.6 oz)  Body mass index is 43.74 kg/m².    Intake/Output Summary (Last 24 hours) at 11/20/2024 0901  Last data filed at 11/20/2024 0559  Gross per 24 hour   Intake 340 ml   Output --   Net 340 ml         Physical Exam  Constitutional:       General: She is not in acute distress.     Appearance: Normal appearance. She is obese.   HENT:      Head: Normocephalic and atraumatic.      Nose: Nose normal.      Mouth/Throat:      Mouth: Mucous membranes are moist.   Eyes:      Comments: Left eye vision very poor.  Patient barely able to see my silhouette from 3 ft.  Right eye vision is better.  Patient able to see me without overt difficulty but states it is dim   Cardiovascular:      Rate and Rhythm: Regular rhythm. Tachycardia present.      Pulses: Normal pulses.      Heart sounds: Normal heart sounds. No murmur heard.     No friction rub. No gallop.   Pulmonary:      Effort: Pulmonary effort is normal. No respiratory distress.      Breath sounds: Normal breath sounds.   Abdominal:      General: Bowel sounds are normal. There is no distension.      Tenderness: There is no abdominal tenderness.   Neurological:      General: No focal deficit present.      Mental Status: She is alert and oriented to person, place, and time.      Cranial Nerves: No cranial nerve deficit.   Psychiatric:         Mood and Affect: Mood normal.         Behavior: Behavior normal.             Significant Labs: All pertinent labs within the past 24 hours have been reviewed.    Significant Imaging: I have reviewed all pertinent imaging results/findings within the past 24 hours.    Assessment/Plan:      * Optic neuritis  -POA   -afebrile   -no leukocytosis = may have subsequent  leukocytosis due to steroids  -sed rate on 11/19/2024 = 4 (within normal limits)  -CRP on 11/19/2024 = within normal limits  -MRI Brain with and without contrast on 11/19/2024 =Interval resolution of previously described medial right temporal lobe FLAIR T2 hyperintensity. No acute intracranial abnormality observed.   -MRI Cervical spine W WO Contrast on 11/19/2024 =No demyelinating disease   -neuro checks  -status post Solu-Medrol 1 g IV once on 11/19/2024  -Solu-Medrol 1 g IV b.i.d. x1 day.  Ordered on 11/2024  -Consulted neurology = follow recommendations      Tachycardia  -patient denies chest pain or shortness on breath.  She is asymptomatic   -likely due to steroids and maybe a component of anxiety post steroids and due to visual disturbance  -EKG on 11/20/2024 = pending  -TSH on 11/20/2024 = pending      Morbid obesity  Body mass index is 43.74 kg/m². Morbid obesity complicates all aspects of disease management from diagnostic modalities to treatment.       VTE Risk Mitigation (From admission, onward)           Ordered     IP VTE HIGH RISK PATIENT  Once         11/19/24 2017     Place sequential compression device  Until discontinued         11/19/24 2017                    Discharge Planning   JORDEN: 11/22/2024     Code Status: Full Code   Is the patient medically ready for discharge?:     Reason for patient still in hospital (select all that apply): Patient trending condition, Treatment, and Consult recommendations  Discharge Plan A: Home with family          Discharge planning to home upon clinical improvement and when cleared by Neurology.  Possible discharge in 1-2 days        Orestes Willis MD  Department of Hospital Medicine   [facility]

## 2024-11-20 NOTE — ASSESSMENT & PLAN NOTE
Body mass index is 42.96 kg/m². Morbid obesity complicates all aspects of disease management from diagnostic modalities to treatment.

## 2024-11-20 NOTE — PLAN OF CARE
Formerly [facility], later [facility] - Emergency Dept  Initial Discharge Assessment       Primary Care Provider: Chelsea, Primary Doctor    Admission Diagnosis: Optic neuritis [H46.9]    Admission Date: 11/19/2024  Expected Discharge Date:      met with Pt at bedside to complete discharge assessment. Pt AAOx4s. Demographics, PCP, and insurance verified. No home health. No dialysis. Pt reports ability to complete ADLs without assistance. Pt verbalized plan to discharge home via family transport. Pt has no other needs to be addressed at this time.     Transition of Care Barriers: (P) None    Payor: MEDICAID / Plan: [employer] / Product Type: Managed Medicaid /     Extended Emergency Contact Information  Primary Emergency Contact: Papo Barber  Address: [address] [place] [address] [place]  Mobile Phone: [phone]  Relation: Significant other  Preferred language: English   needed? No    Discharge Plan A: (P) Home with family  Discharge Plan B: (P) Home      [facility] [address] [facility] [address] [facility].  [address] [place] [id number]  Phone: [phone] Fax: [phone]      Initial Assessment (most recent)       Adult Discharge Assessment - 11/19/24 2048          Discharge Assessment    Assessment Type Discharge Planning Assessment (P)      Confirmed/corrected address, phone number and insurance Yes (P)      Confirmed Demographics Correct on Facesheet (P)      Source of Information patient (P)      When was your last doctors appointment? -- (P)    No PCP    Communicated JORDEN with patient/caregiver Date not available/Unable to determine (P)      Reason For Admission Optic Neuritis (P)      People in Home significant other (P)      Facility Arrived From: Home (P)      Do you expect to return to your current living situation? Yes (P)      Do you have help at home or someone to help you manage your care at home? Yes (P)      Who are your  caregiver(s) and their phone number(s)? Significant Other: Papo Barber [phone] (P)      Prior to hospitilization cognitive status: Alert/Oriented (P)      Current cognitive status: Alert/Oriented (P)      Walking or Climbing Stairs Difficulty no (P)      Dressing/Bathing Difficulty no (P)      Home Accessibility wheelchair accessible (P)      Home Layout Able to live on 1st floor (P)      Equipment Currently Used at Home none (P)      Readmission within 30 days? Yes (P)      Patient currently being followed by outpatient case management? No (P)      Do you currently have service(s) that help you manage your care at home? No (P)      Do you take prescription medications? Yes (P)      Do you have prescription coverage? Yes (P)      Coverage Payor: MEDICAID - [employer] - (P)      Do you have any problems affording any of your prescribed medications? No (P)      Is the patient taking medications as prescribed? yes (P)      Who is going to help you get home at discharge? Significant Other: Papo Barber [phone] (P)      How do you get to doctors appointments? family or friend will provide (P)      Are you on dialysis? No (P)      Do you take coumadin? No (P)      Discharge Plan A Home with family (P)      Discharge Plan B Home (P)      DME Needed Upon Discharge  none (P)      Discharge Plan discussed with: Patient (P)      Transition of Care Barriers None (P)

## 2024-11-20 NOTE — ASSESSMENT & PLAN NOTE
-POA   -afebrile   -no leukocytosis = may have subsequent leukocytosis due to steroids  -sed rate on 11/19/2024 = 4 (within normal limits)  -CRP on 11/19/2024 = within normal limits  -MRI Brain with and without contrast on 11/19/2024 =Interval resolution of previously described medial right temporal lobe FLAIR T2 hyperintensity. No acute intracranial abnormality observed.   -MRI Cervical spine W WO Contrast on 11/19/2024 =No demyelinating disease   -neuro checks  -status post Solu-Medrol 1 g IV once on 11/19/2024  -Solu-Medrol 1 g IV b.i.d. x1 day.  Ordered on 11/2024  -Consulted neurology = follow recommendations

## 2024-11-20 NOTE — SUBJECTIVE & OBJECTIVE
Past Medical History:   Diagnosis Date    Depression        Past Surgical History:   Procedure Laterality Date    TUBAL LIGATION         Review of patient's allergies indicates:  No Known Allergies    No current facility-administered medications on file prior to encounter.     Current Outpatient Medications on File Prior to Encounter   Medication Sig    methylPREDNISolone sodium succinate (SOLU-MEDROL) 1,000 mg injection Mix contents of 1 syringe smoothie for 1 dose (Patient not taking: Reported on 11/19/2024)    [DISCONTINUED] cholecalciferol, vitamin D3, 1,250 mcg (50,000 unit) capsule Take 50,000 Units by mouth every 7 days.    [DISCONTINUED] naproxen (NAPROSYN) 250 MG tablet Take 1 tablet (250 mg total) by mouth every meal as needed (back pain).     Family History       Problem Relation (Age of Onset)    Arthritis Mother          Tobacco Use    Smoking status: Never    Smokeless tobacco: Never   Substance and Sexual Activity    Alcohol use: Not on file    Drug use: Never    Sexual activity: Not on file     Review of Systems   Constitutional:  Negative for activity change and appetite change.   HENT:  Negative for congestion and dental problem.    Eyes:  Negative for discharge and itching.   Respiratory:  Negative for shortness of breath.    Cardiovascular:  Negative for chest pain.   Gastrointestinal:  Negative for abdominal distention and abdominal pain.   Endocrine: Negative for cold intolerance.   Genitourinary:  Negative for difficulty urinating and dysuria.   Musculoskeletal:  Negative for arthralgias and back pain.   Skin:  Negative for color change.   Neurological:  Negative for dizziness and facial asymmetry.   Hematological:  Negative for adenopathy.   Psychiatric/Behavioral:  Negative for agitation and behavioral problems.      Objective:     Vital Signs (Most Recent):  Temp: 98.5 °F (36.9 °C) (11/19/24 1603)  Pulse: 93 (11/19/24 1603)  Resp: 16 (11/19/24 1603)  BP: (!) 134/95 (11/19/24 1603)  SpO2: 97  "% (11/19/24 1603) Vital Signs (24h Range):  Temp:  [98.5 °F (36.9 °C)] 98.5 °F (36.9 °C)  Pulse:  [93] 93  Resp:  [16] 16  SpO2:  [97 %] 97 %  BP: (134)/(95) 134/95     Weight: 110 kg (242 lb 8 oz)  Body mass index is 42.96 kg/m².     Physical Exam  Vitals and nursing note reviewed.   Constitutional:       General: She is not in acute distress.  HENT:      Head: Atraumatic.      Right Ear: External ear normal.      Left Ear: External ear normal.      Nose: Nose normal.      Mouth/Throat:      Mouth: Mucous membranes are moist.   Eyes:      Extraocular Movements: Extraocular movements intact.   Cardiovascular:      Rate and Rhythm: Normal rate.   Pulmonary:      Effort: Pulmonary effort is normal.   Musculoskeletal:         General: Normal range of motion.      Cervical back: Normal range of motion.   Skin:     General: Skin is dry.   Neurological:      Mental Status: She is alert and oriented to person, place, and time.   Psychiatric:         Behavior: Behavior normal.                Significant Labs: All pertinent labs within the past 24 hours have been reviewed.  CBC: No results for input(s): "WBC", "HGB", "HCT", "PLT" in the last 48 hours.  CMP: No results for input(s): "NA", "K", "CL", "CO2", "GLU", "BUN", "CREATININE", "CALCIUM", "PROT", "ALBUMIN", "BILITOT", "ALKPHOS", "AST", "ALT", "ANIONGAP", "EGFRNONAA" in the last 48 hours.    Invalid input(s): "ESTGFAFRICA"    Significant Imaging: I have reviewed all pertinent imaging results/findings within the past 24 hours.  "

## 2024-11-20 NOTE — SUBJECTIVE & OBJECTIVE
Interval History:  Patient awake alert and in no acute distress.  Remains afebrile.  Denies chest pain shortness for breath.  Sitting up in bed.  Complains of blurry vision worse in her right eye.  Complains of double vision in her left eye.  States when she was admitted at [facility] in October they gave her 3 days of high-dose Solu-Medrol 1000 mg IV q.8h or q.12 hours.  It helped her vision.  She was sent home with a dose of oral Solu-Medrol.  Patient denies any anxiety.  Nurses reported tachycardia with heart rate ranging from 120s 145.  Patient is currently tachycardic but appears comfortable.  Appears to be sinus tach.  Likely due to steroids and maybe a component of anxiety    Review of Systems   Constitutional:  Negative for activity change, fatigue and fever.   Eyes:  Positive for visual disturbance.   Respiratory:  Negative for cough and shortness of breath.    Cardiovascular:  Negative for chest pain and leg swelling.   Gastrointestinal:  Negative for abdominal pain, nausea and vomiting.   All other systems reviewed and are negative.    Objective:     Vital Signs (Most Recent):  Temp: 97.9 °F (36.6 °C) (11/20/24 0730)  Pulse: 84 (11/20/24 0730)  Resp: 18 (11/20/24 0730)  BP: 114/74 (11/20/24 0730)  SpO2: 95 % (11/20/24 0730) Vital Signs (24h Range):  Temp:  [97.6 °F (36.4 °C)-98.6 °F (37 °C)] 97.9 °F (36.6 °C)  Pulse:  [84-99] 84  Resp:  [16-18] 18  SpO2:  [95 %-97 %] 95 %  BP: ()/(52-95) 114/74     Weight: 112 kg (246 lb 14.6 oz)  Body mass index is 43.74 kg/m².    Intake/Output Summary (Last 24 hours) at 11/20/2024 0901  Last data filed at 11/20/2024 0559  Gross per 24 hour   Intake 340 ml   Output --   Net 340 ml         Physical Exam  Constitutional:       General: She is not in acute distress.     Appearance: Normal appearance. She is obese.   HENT:      Head: Normocephalic and atraumatic.      Nose: Nose normal.      Mouth/Throat:      Mouth: Mucous membranes are moist.   Eyes:      Comments: Left  eye vision very poor.  Patient barely able to see my silhouette from 3 ft.  Right eye vision is better.  Patient able to see me without overt difficulty but states it is dim   Cardiovascular:      Rate and Rhythm: Regular rhythm. Tachycardia present.      Pulses: Normal pulses.      Heart sounds: Normal heart sounds. No murmur heard.     No friction rub. No gallop.   Pulmonary:      Effort: Pulmonary effort is normal. No respiratory distress.      Breath sounds: Normal breath sounds.   Abdominal:      General: Bowel sounds are normal. There is no distension.      Tenderness: There is no abdominal tenderness.   Neurological:      General: No focal deficit present.      Mental Status: She is alert and oriented to person, place, and time.      Cranial Nerves: No cranial nerve deficit.   Psychiatric:         Mood and Affect: Mood normal.         Behavior: Behavior normal.             Significant Labs: All pertinent labs within the past 24 hours have been reviewed.    Significant Imaging: I have reviewed all pertinent imaging results/findings within the past 24 hours.

## 2024-11-20 NOTE — ED PROVIDER NOTES
Encounter Date: 11/19/2024       History     Chief Complaint   Patient presents with    Eye Problem     R eye optic nerve is very swollen and patient's vision in right eye is very dark and dim in left.  Patient also has issue with L optic nerve.  Patient sent by Dr. Pineda to be admitted to the hospital for IV steroids.     34-year-old female presents emergency department with visual disturbance.  Patient emergency department has a history of optic neuritis.  Likely is having a flare of her optic neuritis.  Was evaluated today by Ophthalmology who saw evidence of optic neuritis right eye.  Left eye within normal limits.  Patient has had blurry vision in the right eye now can not see this out of the right eye.  Please see ophthalmology note for further details intra-ocular pressure was normal in both eyes.  Patient had this issue 1 month ago and was on high dose steroids.  Patient has been off of steroids for 3 weeks.  She says since being off of steroids her symptoms have worsened.  She says she was tested for MS and it was negative.  Case was discussed with Dr. Rdz who is aware of the patient.      Review of patient's allergies indicates:  No Known Allergies  Past Medical History:   Diagnosis Date    Depression      Past Surgical History:   Procedure Laterality Date    TUBAL LIGATION       Family History   Problem Relation Name Age of Onset    Arthritis Mother       Social History     Tobacco Use    Smoking status: Never    Smokeless tobacco: Never   Substance Use Topics    Drug use: Never     Review of Systems   Constitutional:  Negative for chills and fever.   HENT:  Negative for sore throat.    Eyes:  Positive for visual disturbance. Negative for pain.   Respiratory:  Negative for shortness of breath.    Cardiovascular:  Negative for chest pain.   Gastrointestinal:  Negative for nausea and vomiting.   Genitourinary:  Negative for dysuria.   Musculoskeletal:  Negative for back pain.   Skin:  Negative for rash.    Neurological:  Negative for weakness.   Hematological:  Does not bruise/bleed easily.   All other systems reviewed and are negative.      Physical Exam     Initial Vitals [11/19/24 1603]   BP Pulse Resp Temp SpO2   (!) 134/95 93 16 98.5 °F (36.9 °C) 97 %      MAP       --         Physical Exam    Nursing note and vitals reviewed.  Constitutional: She appears well-developed and well-nourished. No distress.   HENT:   Head: Normocephalic and atraumatic. Mouth/Throat: No oropharyngeal exudate.   Eyes: Conjunctivae and EOM are normal. Pupils are equal, round, and reactive to light.   No nystagmus.  Patient has no visual field cut.  Patient only see shadows in light out of the right eye.  Visual acuity as noted in ophthalmology note.   Neck: Neck supple. No tracheal deviation present.   Normal range of motion.  Cardiovascular:  Normal rate, regular rhythm, normal heart sounds and intact distal pulses.           No murmur heard.  Pulmonary/Chest: Breath sounds normal. No stridor. No respiratory distress. She has no wheezes. She has no rhonchi. She has no rales.   Abdominal: Abdomen is soft. She exhibits no distension. There is no abdominal tenderness. There is no rebound.   Musculoskeletal:         General: No tenderness or edema. Normal range of motion.      Cervical back: Normal range of motion and neck supple.     Neurological: She is alert and oriented to person, place, and time. She has normal strength. No cranial nerve deficit or sensory deficit.   Skin: Skin is warm and dry. Capillary refill takes less than 2 seconds. No rash noted. No erythema. No pallor.   Psychiatric: She has a normal mood and affect. Thought content normal.         ED Course   Procedures  Labs Reviewed   COMPREHENSIVE METABOLIC PANEL - Abnormal       Result Value    Sodium 139      Potassium 3.9      Chloride 105      CO2 27      Glucose 103      BUN 7      Creatinine 0.7      Calcium 9.4      Total Protein 7.0      Albumin 4.1      Total  Bilirubin 0.8      Alkaline Phosphatase 126      AST 14      ALT 21      eGFR >60.0      Anion Gap 7 (*)    CBC W/ AUTO DIFFERENTIAL    WBC 8.41      RBC 4.39      Hemoglobin 13.4      Hematocrit 39.4      MCV 90      MCH 30.5      MCHC 34.0      RDW 13.3      Platelets 364      MPV 10.5      Immature Granulocytes 0.2      Gran # (ANC) 5.4      Immature Grans (Abs) 0.02      Lymph # 2.0      Mono # 0.7      Eos # 0.2      Baso # 0.06      nRBC 0      Gran % 64.5      Lymph % 24.3      Mono % 8.2      Eosinophil % 2.1      Basophil % 0.7      Differential Method Automated     C-REACTIVE PROTEIN    CRP 0.20     SEDIMENTATION RATE          Imaging Results              MRI Brain W WO Contrast (In process)  Result time 11/19/24 21:06:52                     MRI Cervical Spine W WO Cont (In process)                      Medications   melatonin tablet 6 mg (has no administration in time range)   ondansetron injection 4 mg (has no administration in time range)   acetaminophen tablet 650 mg (has no administration in time range)   aluminum-magnesium hydroxide-simethicone 200-200-20 mg/5 mL suspension 30 mL (has no administration in time range)   acetaminophen tablet 650 mg (has no administration in time range)   HYDROcodone-acetaminophen 5-325 mg per tablet 1 tablet (has no administration in time range)   naloxone 0.4 mg/mL injection 0.02 mg (has no administration in time range)   potassium bicarbonate disintegrating tablet 50 mEq (has no administration in time range)   potassium bicarbonate disintegrating tablet 35 mEq (has no administration in time range)   potassium bicarbonate disintegrating tablet 60 mEq (has no administration in time range)   magnesium oxide tablet 800 mg (has no administration in time range)   magnesium oxide tablet 800 mg (has no administration in time range)   potassium, sodium phosphates 280-160-250 mg packet 2 packet (has no administration in time range)   potassium, sodium phosphates 280-160-250 mg  packet 2 packet (has no administration in time range)   potassium, sodium phosphates 280-160-250 mg packet 2 packet (has no administration in time range)   famotidine tablet 20 mg (has no administration in time range)   methylPREDNISolone sodium succinate (SOLU-MEDROL) 1,000 mg in D5W 100 mL IVPB (1,000 mg Intravenous New Bag 11/19/24 2038)     Medical Decision Making  Differential includes but not limited to MS, optic neuritis, electrolyte abnormality, dehydration, intracranial hemorrhage, intracranial neoplasm    Emergent evaluation 34-year-old female presents emergency department with visual disturbance.  Patient is having an exacerbation of her optic neuritis.  I discussed the case with Neurology who recommends high dose steroids.  Patient will be given 1000 mg of IV Solu-Medrol here in the emergency department will be admitted for further evaluation of her symptoms.  Neurology recommends MRI of the brain and cervical spine with and without contrast.  Patient will be admitted by the hospitalist I discussed the case in detail with Dr. Rodrigez.    A dictation software program was used for this note.  Please expect some simple typographical  errors in this note.     Amount and/or Complexity of Data Reviewed  External Data Reviewed: labs and notes.  Labs: ordered. Decision-making details documented in ED Course.  ECG/medicine tests: ordered and independent interpretation performed. Decision-making details documented in ED Course.    Risk  OTC drugs.  Prescription drug management.  Decision regarding hospitalization.               ED Course as of 11/19/24 2124 Tue Nov 19, 2024 1904 I discussed the case with Dr. Rdz from Neurology who recommends MRI of the brain and cervical spine with and without contrast.  He recommends 1000 mg of IV Solu-Medrol now [JR]      ED Course User Index  [JR] Carlyle Clemens DO                           Clinical Impression:  Final diagnoses:  [H46.9] Optic neuritis (Primary)  [H53.9]  Visual disturbance          ED Disposition Condition    Admit Stable                Carlyle Clemens,   11/19/24 2124

## 2024-11-20 NOTE — PLAN OF CARE
Problem: Adult Inpatient Plan of Care  Goal: Plan of Care Review  11/20/2024 0932 by Garrett Zamora, RN  Outcome: Progressing  Flowsheets (Taken 11/20/2024 0932)  Plan of Care Reviewed With: patient  11/20/2024 0932 by Garrett Zamora, RN  Outcome: Progressing

## 2024-11-20 NOTE — ASSESSMENT & PLAN NOTE
-patient denies chest pain or shortness on breath.  She is asymptomatic   -likely due to steroids and maybe a component of anxiety post steroids and due to visual disturbance  -EKG on 11/20/2024 = pending  -TSH on 11/20/2024 = pending

## 2024-11-20 NOTE — ASSESSMENT & PLAN NOTE
I gram solumedrol now  MRI Brain and Cervical spine W WO Contrast  Consulted neurology [place] checks

## 2024-11-20 NOTE — H&P
[facility] - Emergency Dept  Hospital Medicine  History & Physical    Patient Name: Dalia Collado  MRN: [id number]  Patient Class: IP- Inpatient  Admission Date: 11/19/2024  Attending Physician: Tarik Rodrigez MD   Primary Care Provider: [facility]         Patient information was obtained from patient and ER records.     Subjective:     Principal Problem:Optic neuritis    Chief Complaint:   Chief Complaint   Patient presents with    Eye Problem     R eye optic nerve is very swollen and patient's vision in right eye is very dark and dim in left.  Patient also has issue with L optic nerve.  Patient sent by Dr. Pineda to be admitted to the hospital for IV steroids.        HPI: 24 year old pt getting admitted with optic neuritis  On October she was diagnosed with optic neuritis on L eye  She was admitted in [facility] [address] and had MRI/LP and ws started on iv steroids  She did well for about a month  Later she staretd having vision problems on R eye   She saw an Ophthalmologist  today and advised to come to ER and got admitted     Past Medical History:   Diagnosis Date    Depression        Past Surgical History:   Procedure Laterality Date    TUBAL LIGATION         Review of patient's allergies indicates:  No Known Allergies    No current facility-administered medications on file prior to encounter.     Current Outpatient Medications on File Prior to Encounter   Medication Sig    methylPREDNISolone sodium succinate (SOLU-MEDROL) 1,000 mg injection Mix contents of 1 syringe smoothie for 1 dose (Patient not taking: Reported on 11/19/2024)    [DISCONTINUED] cholecalciferol, vitamin D3, 1,250 mcg (50,000 unit) capsule Take 50,000 Units by mouth every 7 days.    [DISCONTINUED] naproxen (NAPROSYN) 250 MG tablet Take 1 tablet (250 mg total) by mouth every meal as needed (back pain).     Family History       Problem Relation (Age of Onset)    Arthritis Mother          Tobacco Use    Smoking status:  Never    Smokeless tobacco: Never   Substance and Sexual Activity    Alcohol use: Not on file    Drug use: Never    Sexual activity: Not on file     Review of Systems   Constitutional:  Negative for activity change and appetite change.   HENT:  Negative for congestion and dental problem.    Eyes:  Negative for discharge and itching.   Respiratory:  Negative for shortness of breath.    Cardiovascular:  Negative for chest pain.   Gastrointestinal:  Negative for abdominal distention and abdominal pain.   Endocrine: Negative for cold intolerance.   Genitourinary:  Negative for difficulty urinating and dysuria.   Musculoskeletal:  Negative for arthralgias and back pain.   Skin:  Negative for color change.   Neurological:  Negative for dizziness and facial asymmetry.   Hematological:  Negative for adenopathy.   Psychiatric/Behavioral:  Negative for agitation and behavioral problems.      Objective:     Vital Signs (Most Recent):  Temp: 98.5 °F (36.9 °C) (11/19/24 1603)  Pulse: 93 (11/19/24 1603)  Resp: 16 (11/19/24 1603)  BP: (!) 134/95 (11/19/24 1603)  SpO2: 97 % (11/19/24 1603) Vital Signs (24h Range):  Temp:  [98.5 °F (36.9 °C)] 98.5 °F (36.9 °C)  Pulse:  [93] 93  Resp:  [16] 16  SpO2:  [97 %] 97 %  BP: (134)/(95) 134/95     Weight: 110 kg (242 lb 8 oz)  Body mass index is 42.96 kg/m².     Physical Exam  Vitals and nursing note reviewed.   Constitutional:       General: She is not in acute distress.  HENT:      Head: Atraumatic.      Right Ear: External ear normal.      Left Ear: External ear normal.      Nose: Nose normal.      Mouth/Throat:      Mouth: Mucous membranes are moist.   Eyes:      Extraocular Movements: Extraocular movements intact.   Cardiovascular:      Rate and Rhythm: Normal rate.   Pulmonary:      Effort: Pulmonary effort is normal.   Musculoskeletal:         General: Normal range of motion.      Cervical back: Normal range of motion.   Skin:     General: Skin is dry.   Neurological:      Mental  "Status: She is alert and oriented to person, place, and time.   Psychiatric:         Behavior: Behavior normal.                Significant Labs: All pertinent labs within the past 24 hours have been reviewed.  CBC: No results for input(s): "WBC", "HGB", "HCT", "PLT" in the last 48 hours.  CMP: No results for input(s): "NA", "K", "CL", "CO2", "GLU", "BUN", "CREATININE", "CALCIUM", "PROT", "ALBUMIN", "BILITOT", "ALKPHOS", "AST", "ALT", "ANIONGAP", "EGFRNONAA" in the last 48 hours.    Invalid input(s): "ESTGFAFRICA"    Significant Imaging: I have reviewed all pertinent imaging results/findings within the past 24 hours.  Assessment/Plan:     * Optic neuritis  I gram solumedrol now  MRI Brain and Cervical spine W WO Contrast  Consulted neurology [place] checks      Morbid obesity  Body mass index is 42.96 kg/m². Morbid obesity complicates all aspects of disease management from diagnostic modalities to treatment.       VTE Risk Mitigation (From admission, onward)           Ordered     IP VTE HIGH RISK PATIENT  Once         11/19/24 2017     Place sequential compression device  Until discontinued         11/19/24 2017                                    Tarik Rodrigez MD  Department of Hospital Medicine  Formerly [facility] - Emergency Dept          "

## 2024-11-21 LAB
ALBUMIN SERPL BCP-MCNC: 3.9 G/DL (ref 3.5–5.2)
ALP SERPL-CCNC: 109 U/L (ref 55–135)
ALT SERPL W/O P-5'-P-CCNC: 14 U/L (ref 10–44)
ANION GAP SERPL CALC-SCNC: 8 MMOL/L (ref 8–16)
AST SERPL-CCNC: 8 U/L (ref 10–40)
BASOPHILS # BLD AUTO: 0.02 K/UL (ref 0–0.2)
BASOPHILS NFR BLD: 0.1 % (ref 0–1.9)
BILIRUB SERPL-MCNC: 0.4 MG/DL (ref 0.1–1)
BUN SERPL-MCNC: 12 MG/DL (ref 6–20)
CALCIUM SERPL-MCNC: 8.9 MG/DL (ref 8.7–10.5)
CHLORIDE SERPL-SCNC: 109 MMOL/L (ref 95–110)
CO2 SERPL-SCNC: 24 MMOL/L (ref 23–29)
CREAT SERPL-MCNC: 0.6 MG/DL (ref 0.5–1.4)
DIFFERENTIAL METHOD BLD: ABNORMAL
EOSINOPHIL # BLD AUTO: 0 K/UL (ref 0–0.5)
EOSINOPHIL NFR BLD: 0 % (ref 0–8)
ERYTHROCYTE [DISTWIDTH] IN BLOOD BY AUTOMATED COUNT: 13.4 % (ref 11.5–14.5)
EST. GFR  (NO RACE VARIABLE): >60 ML/MIN/1.73 M^2
GLUCOSE SERPL-MCNC: 155 MG/DL (ref 70–110)
HCT VFR BLD AUTO: 38.6 % (ref 37–48.5)
HGB BLD-MCNC: 13.1 G/DL (ref 12–16)
IMM GRANULOCYTES # BLD AUTO: 0.17 K/UL (ref 0–0.04)
IMM GRANULOCYTES NFR BLD AUTO: 0.7 % (ref 0–0.5)
LYMPHOCYTES # BLD AUTO: 1.5 K/UL (ref 1–4.8)
LYMPHOCYTES NFR BLD: 6.3 % (ref 18–48)
MAGNESIUM SERPL-MCNC: 2 MG/DL (ref 1.6–2.6)
MCH RBC QN AUTO: 30.2 PG (ref 27–31)
MCHC RBC AUTO-ENTMCNC: 33.9 G/DL (ref 32–36)
MCV RBC AUTO: 89 FL (ref 82–98)
MONOCYTES # BLD AUTO: 1.4 K/UL (ref 0.3–1)
MONOCYTES NFR BLD: 6.2 % (ref 4–15)
NEUTROPHILS # BLD AUTO: 20.2 K/UL (ref 1.8–7.7)
NEUTROPHILS NFR BLD: 86.7 % (ref 38–73)
NRBC BLD-RTO: 0 /100 WBC
OHS QRS DURATION: 102 MS
OHS QTC CALCULATION: 484 MS
PHOSPHATE SERPL-MCNC: 3.2 MG/DL (ref 2.7–4.5)
PLATELET # BLD AUTO: 336 K/UL (ref 150–450)
PLATELET BLD QL SMEAR: ABNORMAL
PMV BLD AUTO: 9.6 FL (ref 9.2–12.9)
POTASSIUM SERPL-SCNC: 3.8 MMOL/L (ref 3.5–5.1)
PROT SERPL-MCNC: 6.6 G/DL (ref 6–8.4)
RBC # BLD AUTO: 4.34 M/UL (ref 4–5.4)
SODIUM SERPL-SCNC: 141 MMOL/L (ref 136–145)
WBC # BLD AUTO: 23.25 K/UL (ref 3.9–12.7)

## 2024-11-21 PROCEDURE — 12000002 HC ACUTE/MED SURGE SEMI-PRIVATE ROOM

## 2024-11-21 PROCEDURE — 36415 COLL VENOUS BLD VENIPUNCTURE: CPT | Performed by: INTERNAL MEDICINE

## 2024-11-21 PROCEDURE — 80053 COMPREHEN METABOLIC PANEL: CPT | Performed by: INTERNAL MEDICINE

## 2024-11-21 PROCEDURE — 25500020 PHARM REV CODE 255: Performed by: INTERNAL MEDICINE

## 2024-11-21 PROCEDURE — 63600175 PHARM REV CODE 636 W HCPCS: Performed by: INTERNAL MEDICINE

## 2024-11-21 PROCEDURE — 25000003 PHARM REV CODE 250: Performed by: INTERNAL MEDICINE

## 2024-11-21 PROCEDURE — 85025 COMPLETE CBC W/AUTO DIFF WBC: CPT | Performed by: INTERNAL MEDICINE

## 2024-11-21 PROCEDURE — 83735 ASSAY OF MAGNESIUM: CPT | Performed by: INTERNAL MEDICINE

## 2024-11-21 PROCEDURE — 84100 ASSAY OF PHOSPHORUS: CPT | Performed by: INTERNAL MEDICINE

## 2024-11-21 PROCEDURE — A9585 GADOBUTROL INJECTION: HCPCS | Performed by: INTERNAL MEDICINE

## 2024-11-21 RX ORDER — GADOBUTROL 604.72 MG/ML
10 INJECTION INTRAVENOUS
Status: COMPLETED | OUTPATIENT
Start: 2024-11-21 | End: 2024-11-21

## 2024-11-21 RX ADMIN — DEXTROSE MONOHYDRATE 1000 MG: 50 INJECTION, SOLUTION INTRAVENOUS at 10:11

## 2024-11-21 RX ADMIN — GADOBUTROL 10 ML: 604.72 INJECTION INTRAVENOUS at 09:11

## 2024-11-21 RX ADMIN — FAMOTIDINE 20 MG: 20 TABLET ORAL at 08:11

## 2024-11-21 NOTE — ASSESSMENT & PLAN NOTE
Recurrent second episode and restarted IV high dose steroid   Possible need IVIG or biologics if another episode   Follow up with neuro immunologist at outpatient.  She is trying to get to see.  MRI Brain with and without contrast on 11/19/2024 =Interval resolution of previously described medial right temporal lobe FLAIR T2 hyperintensity. No acute intracranial abnormality observed.   -MRI Cervical spine W WO Contrast on 11/19/2024 =No demyelinating disease   -Solu-Medrol 1 g IV daily

## 2024-11-21 NOTE — PROGRESS NOTES
Count includes the [facility]  Department of Neurology  Progress Note  Date: 2024 4:16 PM          Patient Name: Dalia Collado   MRN: [id number]   : 1990    AGE: 34 y.o.    LOS: 2 days Hospital Day: 3  Admit date: 2024  3:59 PM       HPI: Dalia Collado is a 34 y.o. female with a history of optic neuritis, recently diagnosed MOGAD, presents to the hospital with right optic neuritis.       Patient had decreased vision in her right eye associated with painful eye movements started about 8 days ago and also worsening vision in the left eye for the past 4-5 days for which she presented to the ophthalmologist office and was diagnosed with optic neuritis of the right eye and was recommended to go to the ER.     Patient was admitted to [facility] about a month ago with left eye optic neuritis and workup with MRI brain showing FLAIR hyperintensity in the right temporal lobe without enhancement, enhancement of the left optic nerve was noted and lumbar puncture was done at that time with negative workup in CSF.  She was treated with 5 days of pulse dose steroids 1000 mg daily with some improvement in her vision pain with eye movements in left eye.  Later Serum MOG antibody titer was reported positive however does not followed up with neurology clinic sensory discharge and she is not on any immunosuppressants.     Now presented to the hospital with decreased vision in right greater than left eyes.  She denies any other associated symptoms including nausea, vomiting, gait imbalance, focal weakness or sensory changes, bowel bladder abnormalities.         2024: No acute events overnight. Patient was seen and examined by me this morning. Neuro exam unchanged.  She states the vision is slightly improved.    Vitals:  Patient Vitals for the past 24 hrs:   BP Temp Temp src Pulse Resp SpO2   24 1145 123/87 98.9 °F (37.2 °C) -- 67 19 100 %   24 0730 103/65 98.7 °F (37.1 °C)  "Oral 67 18 98 %   11/21/24 0513 117/73 98.7 °F (37.1 °C) -- 108 18 100 %   11/20/24 2339 120/78 98 °F (36.7 °C) -- (!) 114 17 99 %   11/20/24 1938 133/77 98.2 °F (36.8 °C) -- 106 18 98 %     PHYSICAL EXAM:     GENERAL APPEARANCE: Well-developed, well-nourished female in no acute distress.  HEENT: Normocephalic and atraumatic. PERRL. Oropharynx unremarkable.  PULM: Comfortable on room air.  CV: RRR.  ABDOMEN: Soft, nontender.  EXTREMITIES: No signs of vascular compromise. Pulses present. No cyanosis, clubbing or edema.  SKIN: Clear; no rashes, lesions or skin breaks in exposed areas.      NEURO:   MENTAL STATUS: Patient awake and oriented to time, place, and person. Affect normal.  CRANIAL NERVES II-XII: Pupils equal, round and reactive to light. Extraocular movements full and intact. No facial asymmetry.  MOTOR: Normal bulk. Tone normal and symmetrical throughout.  No abnormal movements. No tremor.   Strength 5/5 throughout unless specified below.  REFLEXES: DTRs 2+; normal and symmetric throughout.   SENSATION: Sensation grossly intact to fine touch.  COORDINATION: Finger-to-nose normal for age and symmetric.  STATION: Romberg deferred.  GAIT: Deferred.    CURRENT SCHEDULED MEDICATIONS:   famotidine  20 mg Oral BID    methylPREDNISolone injection (PEDS and ADULTS)  1,000 mg Intravenous Daily     CURRENT INFUSIONS:    DATA:  Recent Labs   Lab 11/19/24 2034 11/20/24  1213 11/21/24  0555    138 141   K 3.9 4.0 3.8    109 109   CO2 27 20* 24   BUN 7 10 12   CREATININE 0.7 0.7 0.6    195* 155*   CALCIUM 9.4 9.7 8.9   PHOS  --   --  3.2   MG  --  1.8 2.0   AST 14 11 8*   ALT 21 18 14     Recent Labs   Lab 11/19/24 2034 11/20/24  1213 11/21/24  0555   WBC 8.41 14.69* 23.25*   HGB 13.4 13.9 13.1   HCT 39.4 40.2 38.6    350 336     Lab Results   Component Value Date    PROTEINCSF 15 10/22/2024    GLUCCSF 99 (H) 10/22/2024     No results found for: "HGBA1C"         I have personally reviewed and " interpreted the pertinent imaging and lab results.  Imaging Results              MRI Brain W WO Contrast (Final result)  Result time 11/20/24 07:06:28      Final result by Long Obregon DO (11/20/24 07:06:28)                   Impression:      Interval resolution of previously described medial right temporal lobe FLAIR T2 hyperintensity.  No acute intracranial abnormality observed.      Electronically signed by: Long Obregon  Date:    11/20/2024  Time:    07:06               Narrative:    EXAMINATION:  MRI BRAIN W WO CONTRAST    CLINICAL HISTORY:  optic neuritis;    TECHNIQUE:  Multiplanar multisequence MR imaging of the brain was performed with and without IV contrast.    COMPARISON:  MR brain 10/20/2024.    FINDINGS:  Gray matter distinction is preserved throughout the brain parenchyma.  The ventricles are midline without mass effect. There is interval resolution of previously described FLAIR and T2 hyperintensity of the medial right temporal lobe.  No new abnormal areas of FLAIR or T2 hyperintensity.  No intra-axial hemorrhage or restricted diffusion.  No intra-axial fluid collection or mass.  There is no abnormal intracranial enhancement.  Bone marrow signal of the calvarium is within normal limits.  Major vascular flow voids are within normal limits.  The orbits globes and optic nerves are grossly unremarkable.  Paranasal sinuses are unremarkable.                                       MRI Cervical Spine W WO Cont (Final result)  Result time 11/20/24 00:03:31      Final result by Akil Walker MD (11/20/24 00:03:31)                   Impression:      No demyelinating disease.  Other findings above      Electronically signed by: Akil Walker  Date:    11/20/2024  Time:    00:03               Narrative:    EXAMINATION:  MRI CERVICAL SPINE W WO CONTRAST    CLINICAL HISTORY:  optic neuritis;    TECHNIQUE:  Multisequence, multiplanar MR imaging of the cervical spine with and without 10 cc  [facility]    COMPARISON:  10/22/2024    FINDINGS:  Slight reversal of the normal cervical lordosis.    Normal bone marrow signal without fracture or aggressive marrow infiltrative process.    Normal cord signal and contour.  No abnormal enhancement.    C5-6: Mild broad-based disc bulge abuts the ventral thecal sac.  Superimposed 3 mm right posterolateral disc herniation of the protrusion type with annular fissure.  Mild central canal stenosis and mild right foraminal stenosis.  No left foraminal stenosis.    Remaining levels are unremarkable.    Unremarkable prevertebral soft tissues                                           ASSESSMENT AND PLAN:     Optic neuritis  MOGAD     Plan:   Presented to the hospital with optic neuritis which likely secondary to MOGAD.  Workup during her previous hospitalization for NMO disease, MS profile has been negative.  MOG antibody titer was positive.    Treating with pulse dose steroids 1000 mg daily for 5 days.    MRI brain with and without contrast repeated now showed interval resolution of the medial temporal lobe FLAIR hyperintensity.  MRI orbits with and without contrast started and pending.    PT OT evaluate and treat   Outpatient follow-up with neuro immunologist.             Blake Rdz MD  Neurology/vascular Neurology  Date of Service: 11/21/2024  4:16 PM    Please note: This note was transcribed using voice recognition software. Because of this technology there are often uinintended grammatical, spelling, and other transcription errors. Please disregard these errors.

## 2024-11-21 NOTE — SUBJECTIVE & OBJECTIVE
Interval History:     Patient walk in the room   Eye symptoms resolving    Review of Systems  Objective:     Vital Signs (Most Recent):  Temp: 98.9 °F (37.2 °C) (11/21/24 1145)  Pulse: 67 (11/21/24 1145)  Resp: 19 (11/21/24 1145)  BP: 123/87 (11/21/24 1145)  SpO2: 100 % (11/21/24 1145) Vital Signs (24h Range):  Temp:  [98 °F (36.7 °C)-98.9 °F (37.2 °C)] 98.9 °F (37.2 °C)  Pulse:  [] 67  Resp:  [17-19] 19  SpO2:  [98 %-100 %] 100 %  BP: (103-133)/(65-87) 123/87     Weight: 112 kg (246 lb 14.6 oz)  Body mass index is 43.74 kg/m².    Intake/Output Summary (Last 24 hours) at 11/21/2024 1613  Last data filed at 11/21/2024 1523  Gross per 24 hour   Intake 720 ml   Output --   Net 720 ml         Physical Exam  Vitals and nursing note reviewed.   HENT:      Head: Normocephalic and atraumatic.   Eyes:      Conjunctiva/sclera: Conjunctivae normal.   Neck:      Vascular: No JVD.   Cardiovascular:      Rate and Rhythm: Normal rate.      Heart sounds: Normal heart sounds.   Pulmonary:      Effort: Pulmonary effort is normal.      Breath sounds: Normal breath sounds.   Abdominal:      Palpations: Abdomen is soft.   Musculoskeletal:         General: Normal range of motion.   Skin:     General: Skin is warm.   Neurological:      Mental Status: She is alert and oriented to person, place, and time.   Psychiatric:         Mood and Affect: Mood normal.             Significant Labs: All pertinent labs within the past 24 hours have been reviewed.  CBC:   Recent Labs   Lab 11/19/24 2034 11/20/24 1213 11/21/24  0555   WBC 8.41 14.69* 23.25*   HGB 13.4 13.9 13.1   HCT 39.4 40.2 38.6    350 336     CMP:   Recent Labs   Lab 11/19/24 2034 11/20/24 1213 11/21/24  0555    138 141   K 3.9 4.0 3.8    109 109   CO2 27 20* 24    195* 155*   BUN 7 10 12   CREATININE 0.7 0.7 0.6   CALCIUM 9.4 9.7 8.9   PROT 7.0 7.2 6.6   ALBUMIN 4.1 4.1 3.9   BILITOT 0.8 0.6 0.4   ALKPHOS 126 122 109   AST 14 11 8*   ALT 21 18 14  "  ANIONGAP 7* 9 8     Cardiac Markers: No results for input(s): "CKMB", "MYOGLOBIN", "BNP", "TROPISTAT" in the last 48 hours.    Significant Imaging: I have reviewed all pertinent imaging results/findings within the past 24 hours.  "

## 2024-11-21 NOTE — PROGRESS NOTES
[facility] Medicine  Progress Note    Patient Name: Dalia Collado  MRN: [id number]  Patient Class: IP- Inpatient   Admission Date: 11/19/2024  Length of Stay: 2 days  Attending Physician: Willian Quijano MD  Primary Care Provider: Chelsea, Primary Doctor        Subjective:     Principal Problem:Optic neuritis        HPI:  24 year old pt getting admitted with optic neuritis  On October she was diagnosed with optic neuritis on L eye  She was admitted in [facility] and had MRI/LP and ws started on iv steroids  She did well for about a month  Later she staretd having vision problems on R eye   She saw an Ophthalmologist  today and advised to come to ER and got admitted     Overview/Hospital Course:  No notes on file    Interval History:     Patient walk in the room   Eye symptoms resolving    Review of Systems  Objective:     Vital Signs (Most Recent):  Temp: 98.9 °F (37.2 °C) (11/21/24 1145)  Pulse: 67 (11/21/24 1145)  Resp: 19 (11/21/24 1145)  BP: 123/87 (11/21/24 1145)  SpO2: 100 % (11/21/24 1145) Vital Signs (24h Range):  Temp:  [98 °F (36.7 °C)-98.9 °F (37.2 °C)] 98.9 °F (37.2 °C)  Pulse:  [] 67  Resp:  [17-19] 19  SpO2:  [98 %-100 %] 100 %  BP: (103-133)/(65-87) 123/87     Weight: 112 kg (246 lb 14.6 oz)  Body mass index is 43.74 kg/m².    Intake/Output Summary (Last 24 hours) at 11/21/2024 1613  Last data filed at 11/21/2024 1523  Gross per 24 hour   Intake 720 ml   Output --   Net 720 ml         Physical Exam  Vitals and nursing note reviewed.   HENT:      Head: Normocephalic and atraumatic.   Eyes:      Conjunctiva/sclera: Conjunctivae normal.   Neck:      Vascular: No JVD.   Cardiovascular:      Rate and Rhythm: Normal rate.      Heart sounds: Normal heart sounds.   Pulmonary:      Effort: Pulmonary effort is normal.      Breath sounds: Normal breath sounds.   Abdominal:      Palpations: Abdomen is soft.   Musculoskeletal:         General: Normal range of motion.   Skin:      "General: Skin is warm.   Neurological:      Mental Status: She is alert and oriented to person, place, and time.   Psychiatric:         Mood and Affect: Mood normal.             Significant Labs: All pertinent labs within the past 24 hours have been reviewed.  CBC:   Recent Labs   Lab 11/19/24 2034 11/20/24  1213 11/21/24  0555   WBC 8.41 14.69* 23.25*   HGB 13.4 13.9 13.1   HCT 39.4 40.2 38.6    350 336     CMP:   Recent Labs   Lab 11/19/24 2034 11/20/24  1213 11/21/24  0555    138 141   K 3.9 4.0 3.8    109 109   CO2 27 20* 24    195* 155*   BUN 7 10 12   CREATININE 0.7 0.7 0.6   CALCIUM 9.4 9.7 8.9   PROT 7.0 7.2 6.6   ALBUMIN 4.1 4.1 3.9   BILITOT 0.8 0.6 0.4   ALKPHOS 126 122 109   AST 14 11 8*   ALT 21 18 14   ANIONGAP 7* 9 8     Cardiac Markers: No results for input(s): "CKMB", "MYOGLOBIN", "BNP", "TROPISTAT" in the last 48 hours.    Significant Imaging: I have reviewed all pertinent imaging results/findings within the past 24 hours.    Assessment/Plan:      * Optic neuritis  Recurrent second episode and restarted IV high dose steroid   Possible need IVIG or biologics if another episode   Follow up with neuro immunologist at outpatient.  She is trying to get to see.  MRI Brain with and without contrast on 11/19/2024 =Interval resolution of previously described medial right temporal lobe FLAIR T2 hyperintensity. No acute intracranial abnormality observed.   -MRI Cervical spine W WO Contrast on 11/19/2024 =No demyelinating disease   -Solu-Medrol 1 g IV daily        Tachycardia  Resolved   TSH normal      Morbid obesity  Body mass index is 43.74 kg/m². Morbid obesity complicates all aspects of disease management from diagnostic modalities to treatment.       VTE Risk Mitigation (From admission, onward)           Ordered     IP VTE HIGH RISK PATIENT  Once         11/19/24 2017     Place sequential compression device  Until discontinued         11/19/24 2017                    Discharge " Planning   JORDEN: 11/25/2024     Code Status: Full Code   Is the patient medically ready for discharge?:     Reason for patient still in hospital (select all that apply): Treatment  Discharge Plan A: Home with family                  Willian Quijano MD  Department of Hospital Medicine   [facility]

## 2024-11-21 NOTE — NURSING
Arrived to unit via wheelchair, AAOx4 answered questions appropriately  v/s stable, Spouse at bedside, tele monitor in place Box #[id number]. Denies any needs at this time. Educate pt to call for assistance, call light and personal items within reach, wheels locked, bed in lowest position, side rails up x2, bed alarm refused- refusal form signed and in chart.

## 2024-11-22 VITALS
HEART RATE: 72 BPM | RESPIRATION RATE: 18 BRPM | OXYGEN SATURATION: 99 % | TEMPERATURE: 98 F | BODY MASS INDEX: 43.75 KG/M2 | DIASTOLIC BLOOD PRESSURE: 96 MMHG | HEIGHT: 63 IN | WEIGHT: 246.94 LBS | SYSTOLIC BLOOD PRESSURE: 144 MMHG

## 2024-11-22 LAB
ALBUMIN SERPL BCP-MCNC: 3.7 G/DL (ref 3.5–5.2)
ALP SERPL-CCNC: 94 U/L (ref 55–135)
ALT SERPL W/O P-5'-P-CCNC: 12 U/L (ref 10–44)
ANION GAP SERPL CALC-SCNC: 8 MMOL/L (ref 8–16)
AST SERPL-CCNC: 7 U/L (ref 10–40)
BASOPHILS # BLD AUTO: 0.03 K/UL (ref 0–0.2)
BASOPHILS NFR BLD: 0.2 % (ref 0–1.9)
BILIRUB SERPL-MCNC: 0.5 MG/DL (ref 0.1–1)
BUN SERPL-MCNC: 12 MG/DL (ref 6–20)
CALCIUM SERPL-MCNC: 8.5 MG/DL (ref 8.7–10.5)
CHLORIDE SERPL-SCNC: 107 MMOL/L (ref 95–110)
CO2 SERPL-SCNC: 24 MMOL/L (ref 23–29)
CREAT SERPL-MCNC: 0.6 MG/DL (ref 0.5–1.4)
DIFFERENTIAL METHOD BLD: ABNORMAL
EOSINOPHIL # BLD AUTO: 0 K/UL (ref 0–0.5)
EOSINOPHIL NFR BLD: 0 % (ref 0–8)
ERYTHROCYTE [DISTWIDTH] IN BLOOD BY AUTOMATED COUNT: 13.2 % (ref 11.5–14.5)
EST. GFR  (NO RACE VARIABLE): >60 ML/MIN/1.73 M^2
GLUCOSE SERPL-MCNC: 140 MG/DL (ref 70–110)
HCT VFR BLD AUTO: 38.1 % (ref 37–48.5)
HGB BLD-MCNC: 13 G/DL (ref 12–16)
IMM GRANULOCYTES # BLD AUTO: 0.19 K/UL (ref 0–0.04)
IMM GRANULOCYTES NFR BLD AUTO: 1 % (ref 0–0.5)
LYMPHOCYTES # BLD AUTO: 1.6 K/UL (ref 1–4.8)
LYMPHOCYTES NFR BLD: 8.2 % (ref 18–48)
MAGNESIUM SERPL-MCNC: 1.9 MG/DL (ref 1.6–2.6)
MCH RBC QN AUTO: 30.2 PG (ref 27–31)
MCHC RBC AUTO-ENTMCNC: 34.1 G/DL (ref 32–36)
MCV RBC AUTO: 88 FL (ref 82–98)
MONOCYTES # BLD AUTO: 1 K/UL (ref 0.3–1)
MONOCYTES NFR BLD: 5 % (ref 4–15)
NEUTROPHILS # BLD AUTO: 16.5 K/UL (ref 1.8–7.7)
NEUTROPHILS NFR BLD: 85.6 % (ref 38–73)
NRBC BLD-RTO: 0 /100 WBC
PLATELET # BLD AUTO: 304 K/UL (ref 150–450)
PMV BLD AUTO: 9.8 FL (ref 9.2–12.9)
POTASSIUM SERPL-SCNC: 3.7 MMOL/L (ref 3.5–5.1)
PROT SERPL-MCNC: 6.2 G/DL (ref 6–8.4)
RBC # BLD AUTO: 4.31 M/UL (ref 4–5.4)
SODIUM SERPL-SCNC: 139 MMOL/L (ref 136–145)
WBC # BLD AUTO: 19.3 K/UL (ref 3.9–12.7)

## 2024-11-22 PROCEDURE — 85025 COMPLETE CBC W/AUTO DIFF WBC: CPT | Performed by: INTERNAL MEDICINE

## 2024-11-22 PROCEDURE — 63600175 PHARM REV CODE 636 W HCPCS: Performed by: INTERNAL MEDICINE

## 2024-11-22 PROCEDURE — 25000003 PHARM REV CODE 250: Performed by: INTERNAL MEDICINE

## 2024-11-22 PROCEDURE — 80053 COMPREHEN METABOLIC PANEL: CPT | Performed by: INTERNAL MEDICINE

## 2024-11-22 PROCEDURE — 83735 ASSAY OF MAGNESIUM: CPT | Performed by: INTERNAL MEDICINE

## 2024-11-22 PROCEDURE — 36415 COLL VENOUS BLD VENIPUNCTURE: CPT | Performed by: INTERNAL MEDICINE

## 2024-11-22 RX ORDER — PREDNISONE 50 MG/1
1000 TABLET ORAL DAILY
Qty: 40 TABLET | Refills: 0 | Status: SHIPPED | OUTPATIENT
Start: 2024-11-22 | End: 2024-11-24

## 2024-11-22 RX ADMIN — DEXTROSE MONOHYDRATE 1000 MG: 50 INJECTION, SOLUTION INTRAVENOUS at 09:11

## 2024-11-22 RX ADMIN — FAMOTIDINE 20 MG: 20 TABLET ORAL at 09:11

## 2024-11-22 NOTE — PLAN OF CARE
Problem: Adult Inpatient Plan of Care  Goal: Plan of Care Review  11/22/2024 1607 by Eliana Gibbs RN  Outcome: Met  11/22/2024 1409 by Eliana Gibbs RN  Outcome: Progressing  Goal: Patient-Specific Goal (Individualized)  11/22/2024 1607 by Eliana Gibbs RN  Outcome: Met  11/22/2024 1409 by Eliana Gibbs RN  Outcome: Progressing  Goal: Absence of Hospital-Acquired Illness or Injury  11/22/2024 1607 by Eliana Gibbs RN  Outcome: Met  11/22/2024 1409 by Eliana Gibbs RN  Outcome: Progressing  Goal: Optimal Comfort and Wellbeing  11/22/2024 1607 by Eliana Gibbs RN  Outcome: Met  11/22/2024 1409 by Eliana Gibbs RN  Outcome: Progressing  Goal: Readiness for Transition of Care  11/22/2024 1607 by Eliana Gibbs RN  Outcome: Met  11/22/2024 1409 by Eliana Gibbs RN  Outcome: Progressing     Problem: Bariatric Environmental Safety  Goal: Safety Maintained with Care  11/22/2024 1607 by Eliana Gibbs RN  Outcome: Met  11/22/2024 1409 by Eliana Gibbs RN  Outcome: Progressing     Problem: Infection  Goal: Absence of Infection Signs and Symptoms  11/22/2024 1607 by Eliana Gibbs RN  Outcome: Met  11/22/2024 1409 by Eliana Gibbs RN  Outcome: Progressing

## 2024-11-22 NOTE — ASSESSMENT & PLAN NOTE
Recurrent second episode and restarted IV high dose steroid   Possible need IVIG or biologics if another episode   Follow up with neuro immunologist at outpatient.  She is trying to get to see.  MRI Brain with and without contrast on 11/19/2024 =Interval resolution of previously described medial right temporal lobe FLAIR T2 hyperintensity. No acute intracranial abnormality observed.   -MRI Cervical spine W WO Contrast on 11/19/2024 =No demyelinating disease   -Solu-Medrol 1 g IV daily for total five days   May need to give PO the last day tomorrow

## 2024-11-22 NOTE — ASSESSMENT & PLAN NOTE
Body mass index is 43.74 kg/m². Morbid obesity complicates all aspects of disease management from diagnostic modalities to treatment.    left 4th

## 2024-11-22 NOTE — SUBJECTIVE & OBJECTIVE
Interval History:     Seen with team   Less eys symptoms   She wants to get DC tomorrow for personal important reason     Review of Systems  Objective:     Vital Signs (Most Recent):  Temp: 98.2 °F (36.8 °C) (11/22/24 1117)  Pulse: 68 (11/22/24 1117)  Resp: 17 (11/22/24 1117)  BP: 132/84 (11/22/24 1117)  SpO2: 99 % (11/22/24 1117) Vital Signs (24h Range):  Temp:  [97.8 °F (36.6 °C)-98.4 °F (36.9 °C)] 98.2 °F (36.8 °C)  Pulse:  [55-93] 68  Resp:  [16-19] 17  SpO2:  [96 %-99 %] 99 %  BP: (116-132)/(66-87) 132/84     Weight: 112 kg (246 lb 14.6 oz)  Body mass index is 43.74 kg/m².    Intake/Output Summary (Last 24 hours) at 11/22/2024 1312  Last data filed at 11/21/2024 2053  Gross per 24 hour   Intake 360 ml   Output --   Net 360 ml         Physical Exam  Vitals and nursing note reviewed.   HENT:      Head: Normocephalic and atraumatic.   Eyes:      Conjunctiva/sclera: Conjunctivae normal.   Neck:      Vascular: No JVD.   Cardiovascular:      Heart sounds: Normal heart sounds.   Pulmonary:      Effort: Pulmonary effort is normal.   Abdominal:      Palpations: Abdomen is soft.   Skin:     General: Skin is warm.   Neurological:      Mental Status: She is alert and oriented to person, place, and time.   Psychiatric:         Mood and Affect: Mood normal.             Significant Labs: All pertinent labs within the past 24 hours have been reviewed.  BMP:   Recent Labs   Lab 11/22/24  0431   *      K 3.7      CO2 24   BUN 12   CREATININE 0.6   CALCIUM 8.5*   MG 1.9     CBC:   Recent Labs   Lab 11/21/24  0555 11/22/24  0431   WBC 23.25* 19.30*   HGB 13.1 13.0   HCT 38.6 38.1    304     CMP:   Recent Labs   Lab 11/21/24  0555 11/22/24  0431    139   K 3.8 3.7    107   CO2 24 24   * 140*   BUN 12 12   CREATININE 0.6 0.6   CALCIUM 8.9 8.5*   PROT 6.6 6.2   ALBUMIN 3.9 3.7   BILITOT 0.4 0.5   ALKPHOS 109 94   AST 8* 7*   ALT 14 12   ANIONGAP 8 8       Significant Imaging: I have  reviewed all pertinent imaging results/findings within the past 24 hours.

## 2024-11-22 NOTE — HOSPITAL COURSE
24 year old patient was admitted with recurrent  optic neuritis  On October she was diagnosed with optic neuritis on L eye and was  admitted in [facility] and had MRI/LP and ws started on iv steroids 5 days and better.  Started having vision problems on R eye again and admitted here again .  Seen by neurology and MRI orbit was done and negative and repeat MRI of brain and spines are done negative for MS findings .  Started high dose IV steroid for total 5 days and neurology  and symptoms better.  However patient has serious person and problem and wants to leave the hospital and patient received 3 days of IV Solu-Medrol 1000 mg and discharged with 2 days' course of 1000 mg daily p.o. prednisone to complete 5 days course .

## 2024-11-22 NOTE — DISCHARGE SUMMARY
[facility] Medicine  Discharge Summary      Patient Name: Dalia Collado  MRN: [id number]  HAILEY: [id number]  Patient Class: IP- Inpatient  Admission Date: 11/19/2024  Hospital Length of Stay: 3 days  Discharge Date and Time:  11/22/2024 4:00 PM  Attending Physician: Willian uQijano MD   Discharging Provider: Willian Quijano MD  Primary Care Provider: Chelsea, Primary Doctor    Primary Care Team: Networked reference to record PCT     HPI:   24 year old pt getting admitted with optic neuritis  On October she was diagnosed with optic neuritis on L eye  She was admitted in [facility] and had MRI/LP and ws started on iv steroids  She did well for about a month  Later she staretd having vision problems on R eye   She saw an Ophthalmologist  today and advised to come to ER and got admitted     * No surgery found *      Hospital Course:   24 year old patient was admitted with recurrent  optic neuritis  On October she was diagnosed with optic neuritis on L eye and was  admitted in [facility] and had MRI/LP and ws started on iv steroids 5 days and better.  Started having vision problems on R eye again and admitted here again .  Seen by neurology and MRI orbit was done and negative and repeat MRI of brain and spines are done negative for MS findings .  Started high dose IV steroid for total 5 days and neurology  and symptoms better.  However patient has serious person and problem and wants to leave the hospital and patient received 3 days of IV Solu-Medrol 1000 mg and discharged with 2 days' course of 1000 mg daily p.o. prednisone to complete 5 days course .        Goals of Care Treatment Preferences:  Code Status: Full Code      SDOH Screening:  The patient was screened for food insecurity, housing instability, transportation needs, utility difficulties, and interpersonal safety. The social determinant(s) of health identified as a concern this admission are:  Food insecurity  Transportation  difficulties        Social Drivers of Health with Concerns     Food Insecurity: Food Insecurity Present (11/19/2024)   Transportation Needs: Unmet Transportation Needs (11/19/2024)        Consults:   Consults (From admission, onward)          Status Ordering Provider     Inpatient consult to Neurology  Once        Provider:  Daryl Rdz MD    Completed ELIE MAURO            No new Assessment & Plan notes have been filed under this hospital service since the last note was generated.  Service: Hospital Medicine    Final Active Diagnoses:    Diagnosis Date Noted POA    PRINCIPAL PROBLEM:  Optic neuritis [H46.9] 10/20/2024 Yes    Tachycardia [R00.0] 11/20/2024 No    Morbid obesity [E66.01] 11/19/2024 Yes      Problems Resolved During this Admission:       Discharged Condition: good    Disposition: Home or Self Care    Follow Up:   Follow-up Information       Geraldo Burks MD. Schedule an appointment as soon as possible for a visit in 1 week(s).    Specialty: Ophthalmology  Contact information:  [address] [place] [address]  [address]  [facility]  [place] [address]  [phone]               Blake Rdz MD Follow up in 1 week(s).    Specialty: Neurology  Contact information:  [address] [facility]  [place] [address]  [phone]               patient neuro immunologist Follow up in 1 week(s).                           Patient Instructions:      Diet Adult Regular     Activity as tolerated       Significant Diagnostic Studies: Labs: CMP   Recent Labs   Lab 11/21/24  0555 11/22/24  0431    139   K 3.8 3.7    107   CO2 24 24   * 140*   BUN 12 12   CREATININE 0.6 0.6   CALCIUM 8.9 8.5*   PROT 6.6 6.2   ALBUMIN 3.9 3.7   BILITOT 0.4 0.5   ALKPHOS 109 94   AST 8* 7*   ALT 14 12   ANIONGAP 8 8    and CBC   Recent Labs   Lab 11/21/24  0555 11/22/24  0431   WBC 23.25* 19.30*   HGB 13.1 13.0   HCT 38.6 38.1    304       Pending Diagnostic Studies:       None            Medications:  Reconciled Home Medications:      Medication List        START taking these medications      predniSONE 50 MG Tab  Commonly known as: DELTASONE  Take 20 tablets (1,000 mg total) by mouth once daily. for 2 days            STOP taking these medications      methylPREDNISolone sodium succinate 1,000 mg injection  Commonly known as: SOLU-MEDROL              Indwelling Lines/Drains at time of discharge:   Lines/Drains/Airways       None                 General: Patient resting comfortably in no acute distress. Appears as stated age. Calm  Eyes: EOM intact. No conjunctivae injection. No scleral icterus.  ENT: Hearing grossly intact. No discharge from ears. No nasal discharge.   CVS: RRR. No LE edema BL.  Lungs: CTA BL, no wheezing or crackles. Good breath sounds. No accessory muscle use. No acute respiratory distress  Neuro: non focal , Follows commands. Responds appropriately   Time spent on the discharge of patient: 33 minutes         Willian Quijano MD  Department of Hospital Medicine  [facility]

## 2024-11-22 NOTE — PLAN OF CARE
Pt clear for DC from CM standpoint. Discharging home.    Sent email to [facility] to schedule hospital follow up. Added to AVS.       11/22/24 7455   Final Note   Assessment Type Final Discharge Note   Anticipated Discharge Disposition Home   Hospital Resources/Appts/Education Provided Provided patient/caregiver with written discharge plan information

## 2024-11-22 NOTE — PLAN OF CARE
POC reviewed with patient this shift.  A/O x4.  Respirations unlabored on RA.  Skin w/d.  Continent of b/b.  Ambulates to restroom without difficulty.  Pt reports vision to left eye normal but vision to rt eye continues to be blurry.  Tolerates meds whole with water without difficulty.  VSS.  See flowsheet for full assessment.  Able to verbalize wants/needs.  No s/s of distress.  Fall/safety precautions maintained.      Problem: Adult Inpatient Plan of Care  Goal: Plan of Care Review  Outcome: Progressing     Problem: Infection  Goal: Absence of Infection Signs and Symptoms  Outcome: Progressing

## 2024-11-22 NOTE — PROGRESS NOTES
[facility] Medicine  Progress Note    Patient Name: Dalia Collado  MRN: [id number]  Patient Class: IP- Inpatient   Admission Date: 11/19/2024  Length of Stay: 3 days  Attending Physician: Willian Quijano MD  Primary Care Provider: Chelsea, Primary Doctor        Subjective:     Principal Problem:Optic neuritis        HPI:  24 year old pt getting admitted with optic neuritis  On October she was diagnosed with optic neuritis on L eye  She was admitted in [facility] and had MRI/LP and ws started on iv steroids  She did well for about a month  Later she staretd having vision problems on R eye   She saw an Ophthalmologist  today and advised to come to ER and got admitted     Overview/Hospital Course:  24 year old patient was admitted with recurrent  optic neuritis  On October she was diagnosed with optic neuritis on L eye and was  admitted in [facility] and had MRI/LP and ws started on iv steroids 5 days and better.  Started having vision problems on R eye again and admitted here again .  Seen by neurology and MRI orbit was done and negative and repeat MRI of brain and spines are done negative for MS findings .  Started high dose IV steroid and better       Interval History:     Seen with team   Less eys symptoms   She wants to get DC tomorrow for personal important reason     Review of Systems  Objective:     Vital Signs (Most Recent):  Temp: 98.2 °F (36.8 °C) (11/22/24 1117)  Pulse: 68 (11/22/24 1117)  Resp: 17 (11/22/24 1117)  BP: 132/84 (11/22/24 1117)  SpO2: 99 % (11/22/24 1117) Vital Signs (24h Range):  Temp:  [97.8 °F (36.6 °C)-98.4 °F (36.9 °C)] 98.2 °F (36.8 °C)  Pulse:  [55-93] 68  Resp:  [16-19] 17  SpO2:  [96 %-99 %] 99 %  BP: (116-132)/(66-87) 132/84     Weight: 112 kg (246 lb 14.6 oz)  Body mass index is 43.74 kg/m².    Intake/Output Summary (Last 24 hours) at 11/22/2024 1312  Last data filed at 11/21/2024 2053  Gross per 24 hour   Intake 360 ml   Output --   Net 360 ml          Physical Exam  Vitals and nursing note reviewed.   HENT:      Head: Normocephalic and atraumatic.   Eyes:      Conjunctiva/sclera: Conjunctivae normal.   Neck:      Vascular: No JVD.   Cardiovascular:      Heart sounds: Normal heart sounds.   Pulmonary:      Effort: Pulmonary effort is normal.   Abdominal:      Palpations: Abdomen is soft.   Skin:     General: Skin is warm.   Neurological:      Mental Status: She is alert and oriented to person, place, and time.   Psychiatric:         Mood and Affect: Mood normal.             Significant Labs: All pertinent labs within the past 24 hours have been reviewed.  BMP:   Recent Labs   Lab 11/22/24  0431   *      K 3.7      CO2 24   BUN 12   CREATININE 0.6   CALCIUM 8.5*   MG 1.9     CBC:   Recent Labs   Lab 11/21/24  0555 11/22/24  0431   WBC 23.25* 19.30*   HGB 13.1 13.0   HCT 38.6 38.1    304     CMP:   Recent Labs   Lab 11/21/24 0555 11/22/24  0431    139   K 3.8 3.7    107   CO2 24 24   * 140*   BUN 12 12   CREATININE 0.6 0.6   CALCIUM 8.9 8.5*   PROT 6.6 6.2   ALBUMIN 3.9 3.7   BILITOT 0.4 0.5   ALKPHOS 109 94   AST 8* 7*   ALT 14 12   ANIONGAP 8 8       Significant Imaging: I have reviewed all pertinent imaging results/findings within the past 24 hours.    Assessment/Plan:      * Optic neuritis  Recurrent second episode and restarted IV high dose steroid   Possible need IVIG or biologics if another episode   Follow up with neuro immunologist at outpatient.  She is trying to get to see.  MRI Brain with and without contrast on 11/19/2024 =Interval resolution of previously described medial right temporal lobe FLAIR T2 hyperintensity. No acute intracranial abnormality observed.   -MRI Cervical spine W WO Contrast on 11/19/2024 =No demyelinating disease   -Solu-Medrol 1 g IV daily for total five days   May need to give PO the last day tomorrow         Tachycardia  Resolved   TSH normal      Morbid obesity  Body mass index  is 43.74 kg/m². Morbid obesity complicates all aspects of disease management from diagnostic modalities to treatment.       VTE Risk Mitigation (From admission, onward)           Ordered     IP VTE HIGH RISK PATIENT  Once         11/19/24 2017     Place sequential compression device  Until discontinued         11/19/24 2017                    Discharge Planning   JORDEN: 11/25/2024     Code Status: Full Code   Is the patient medically ready for discharge?:     Reason for patient still in hospital (select all that apply): Treatment  Discharge Plan A: Home with family                  Willian Quijano MD  Department of Hospital Medicine   [facility]

## 2024-11-25 ENCOUNTER — TELEPHONE (OUTPATIENT)
Dept: OPTOMETRY | Facility: CLINIC | Age: 34
End: 2024-11-25
Payer: MEDICAID

## 2024-11-25 ENCOUNTER — TELEPHONE (OUTPATIENT)
Dept: OPHTHALMOLOGY | Facility: CLINIC | Age: 34
End: 2024-11-25
Payer: MEDICAID

## 2024-11-25 NOTE — TELEPHONE ENCOUNTER
Called pt to schedule f/u.   No answer, LVM for pt to call back to schedule with Dr. Burks on Monday 12-2-24.

## 2024-11-25 NOTE — TELEPHONE ENCOUNTER
----- Message from Kylee Moses sent at 2024  4:17 PM CST -----  Appointment Request  Staceybridgett Collado  Sent:   1:39 PM  To: [facility]       Dalia Collado  MRN: [id number] : 1990  Pt Home: [phone]    Entered: [phone]      Message    Appointment Request From: Dalia Collado    With Provider: Geraldo Burks MD [[facility]]    Preferred Date Range: Any    Preferred Times: Any Time    Reason for visit: Office Visit    Comments:  Follow up after hospital stay

## 2025-01-15 ENCOUNTER — HOSPITAL ENCOUNTER (INPATIENT)
Facility: HOSPITAL | Age: 35
LOS: 4 days | Discharge: HOME | DRG: 059 | End: 2025-01-19
Attending: EMERGENCY MEDICINE
Payer: MEDICAID

## 2025-01-15 DIAGNOSIS — R20.0 NUMBNESS AND TINGLING OF BOTH LOWER EXTREMITIES: ICD-10-CM

## 2025-01-15 DIAGNOSIS — R33.9 URINARY RETENTION: Primary | ICD-10-CM

## 2025-01-15 DIAGNOSIS — R00.0 TACHYCARDIA: ICD-10-CM

## 2025-01-15 DIAGNOSIS — R07.9 CHEST PAIN: ICD-10-CM

## 2025-01-15 DIAGNOSIS — G37.81 MYELIN OLIGODENDROCYTE GLYCOPROTEIN ANTIBODY DISORDER (MOGAD): ICD-10-CM

## 2025-01-15 DIAGNOSIS — R20.2 NUMBNESS AND TINGLING OF BOTH LOWER EXTREMITIES: ICD-10-CM

## 2025-01-15 LAB
ALBUMIN SERPL BCP-MCNC: 3.9 G/DL (ref 3.5–5.2)
ALP SERPL-CCNC: 149 U/L (ref 55–135)
ALT SERPL W/O P-5'-P-CCNC: 12 U/L (ref 10–44)
ANION GAP SERPL CALC-SCNC: 8 MMOL/L (ref 8–16)
AST SERPL-CCNC: 12 U/L (ref 10–40)
B-HCG UR QL: NEGATIVE
BASOPHILS # BLD AUTO: 0.07 K/UL (ref 0–0.2)
BASOPHILS NFR BLD: 0.9 % (ref 0–1.9)
BILIRUB SERPL-MCNC: 0.4 MG/DL (ref 0.1–1)
BILIRUB UR QL STRIP: NEGATIVE
BUN SERPL-MCNC: 8 MG/DL (ref 6–20)
CALCIUM SERPL-MCNC: 8.5 MG/DL (ref 8.7–10.5)
CHLORIDE SERPL-SCNC: 108 MMOL/L (ref 95–110)
CK SERPL-CCNC: 44 U/L (ref 20–180)
CLARITY UR: CLEAR
CO2 SERPL-SCNC: 24 MMOL/L (ref 23–29)
COLOR UR: YELLOW
CREAT SERPL-MCNC: 0.7 MG/DL (ref 0.5–1.4)
CRP SERPL-MCNC: 0.4 MG/DL
CTP QC/QA: YES
DIFFERENTIAL METHOD BLD: NORMAL
EOSINOPHIL # BLD AUTO: 0.3 K/UL (ref 0–0.5)
EOSINOPHIL NFR BLD: 3.1 % (ref 0–8)
ERYTHROCYTE [DISTWIDTH] IN BLOOD BY AUTOMATED COUNT: 13 % (ref 11.5–14.5)
ERYTHROCYTE [SEDIMENTATION RATE] IN BLOOD BY WESTERGREN METHOD: 20 MM/HR (ref 0–20)
EST. GFR  (NO RACE VARIABLE): >60 ML/MIN/1.73 M^2
GLUCOSE SERPL-MCNC: 129 MG/DL (ref 70–110)
GLUCOSE UR QL STRIP: NEGATIVE
HCT VFR BLD AUTO: 39.1 % (ref 37–48.5)
HGB BLD-MCNC: 13.1 G/DL (ref 12–16)
HGB UR QL STRIP: NEGATIVE
IMM GRANULOCYTES # BLD AUTO: 0.03 K/UL (ref 0–0.04)
IMM GRANULOCYTES NFR BLD AUTO: 0.4 % (ref 0–0.5)
KETONES UR QL STRIP: NEGATIVE
LEUKOCYTE ESTERASE UR QL STRIP: ABNORMAL
LIPASE SERPL-CCNC: 21 U/L (ref 4–60)
LYMPHOCYTES # BLD AUTO: 1.9 K/UL (ref 1–4.8)
LYMPHOCYTES NFR BLD: 23.5 % (ref 18–48)
MAGNESIUM SERPL-MCNC: 1.8 MG/DL (ref 1.6–2.6)
MCH RBC QN AUTO: 30 PG (ref 27–31)
MCHC RBC AUTO-ENTMCNC: 33.5 G/DL (ref 32–36)
MCV RBC AUTO: 90 FL (ref 82–98)
MICROSCOPIC COMMENT: ABNORMAL
MONOCYTES # BLD AUTO: 0.7 K/UL (ref 0.3–1)
MONOCYTES NFR BLD: 8.4 % (ref 4–15)
NEUTROPHILS # BLD AUTO: 5.2 K/UL (ref 1.8–7.7)
NEUTROPHILS NFR BLD: 63.7 % (ref 38–73)
NITRITE UR QL STRIP: NEGATIVE
NRBC BLD-RTO: 0 /100 WBC
PH UR STRIP: 6 [PH] (ref 5–8)
PLATELET # BLD AUTO: 398 K/UL (ref 150–450)
PMV BLD AUTO: 9.5 FL (ref 9.2–12.9)
POTASSIUM SERPL-SCNC: 3.7 MMOL/L (ref 3.5–5.1)
PROT SERPL-MCNC: 7 G/DL (ref 6–8.4)
PROT UR QL STRIP: NEGATIVE
RBC # BLD AUTO: 4.37 M/UL (ref 4–5.4)
RBC #/AREA URNS HPF: 1 /HPF (ref 0–4)
SODIUM SERPL-SCNC: 140 MMOL/L (ref 136–145)
SP GR UR STRIP: 1.01 (ref 1–1.03)
SQUAMOUS #/AREA URNS HPF: 2 /HPF
URN SPEC COLLECT METH UR: ABNORMAL
UROBILINOGEN UR STRIP-ACNC: NEGATIVE EU/DL
WBC # BLD AUTO: 8.18 K/UL (ref 3.9–12.7)
WBC #/AREA URNS HPF: 6 /HPF (ref 0–5)

## 2025-01-15 PROCEDURE — 96372 THER/PROPH/DIAG INJ SC/IM: CPT | Performed by: INTERNAL MEDICINE

## 2025-01-15 PROCEDURE — 25500020 PHARM REV CODE 255: Performed by: INTERNAL MEDICINE

## 2025-01-15 PROCEDURE — A9585 GADOBUTROL INJECTION: HCPCS | Performed by: INTERNAL MEDICINE

## 2025-01-15 PROCEDURE — 25000003 PHARM REV CODE 250

## 2025-01-15 PROCEDURE — 81025 URINE PREGNANCY TEST: CPT

## 2025-01-15 PROCEDURE — 85651 RBC SED RATE NONAUTOMATED: CPT | Performed by: EMERGENCY MEDICINE

## 2025-01-15 PROCEDURE — G0378 HOSPITAL OBSERVATION PER HR: HCPCS

## 2025-01-15 PROCEDURE — 83690 ASSAY OF LIPASE: CPT

## 2025-01-15 PROCEDURE — 12000002 HC ACUTE/MED SURGE SEMI-PRIVATE ROOM

## 2025-01-15 PROCEDURE — 99285 EMERGENCY DEPT VISIT HI MDM: CPT | Mod: 25

## 2025-01-15 PROCEDURE — 81001 URINALYSIS AUTO W/SCOPE: CPT

## 2025-01-15 PROCEDURE — 85025 COMPLETE CBC W/AUTO DIFF WBC: CPT

## 2025-01-15 PROCEDURE — 63600175 PHARM REV CODE 636 W HCPCS: Performed by: INTERNAL MEDICINE

## 2025-01-15 PROCEDURE — 86140 C-REACTIVE PROTEIN: CPT

## 2025-01-15 PROCEDURE — 51798 US URINE CAPACITY MEASURE: CPT

## 2025-01-15 PROCEDURE — 80053 COMPREHEN METABOLIC PANEL: CPT

## 2025-01-15 PROCEDURE — 25000003 PHARM REV CODE 250: Performed by: INTERNAL MEDICINE

## 2025-01-15 PROCEDURE — 82550 ASSAY OF CK (CPK): CPT

## 2025-01-15 PROCEDURE — 83735 ASSAY OF MAGNESIUM: CPT

## 2025-01-15 PROCEDURE — 36415 COLL VENOUS BLD VENIPUNCTURE: CPT | Performed by: EMERGENCY MEDICINE

## 2025-01-15 RX ORDER — ENOXAPARIN SODIUM 100 MG/ML
40 INJECTION SUBCUTANEOUS EVERY 24 HOURS
Status: DISCONTINUED | OUTPATIENT
Start: 2025-01-15 | End: 2025-01-19 | Stop reason: HOSPADM

## 2025-01-15 RX ORDER — ACETAMINOPHEN 325 MG/1
650 TABLET ORAL EVERY 8 HOURS PRN
Status: DISCONTINUED | OUTPATIENT
Start: 2025-01-15 | End: 2025-01-19 | Stop reason: HOSPADM

## 2025-01-15 RX ORDER — ACETAMINOPHEN 500 MG
1000 TABLET ORAL
Status: COMPLETED | OUTPATIENT
Start: 2025-01-15 | End: 2025-01-15

## 2025-01-15 RX ORDER — ACETAMINOPHEN 325 MG/1
650 TABLET ORAL EVERY 4 HOURS PRN
Status: DISCONTINUED | OUTPATIENT
Start: 2025-01-15 | End: 2025-01-19 | Stop reason: HOSPADM

## 2025-01-15 RX ORDER — GADOBUTROL 604.72 MG/ML
10 INJECTION INTRAVENOUS
Status: COMPLETED | OUTPATIENT
Start: 2025-01-15 | End: 2025-01-15

## 2025-01-15 RX ORDER — SODIUM,POTASSIUM PHOSPHATES 280-250MG
2 POWDER IN PACKET (EA) ORAL
Status: DISCONTINUED | OUTPATIENT
Start: 2025-01-15 | End: 2025-01-19 | Stop reason: HOSPADM

## 2025-01-15 RX ORDER — LANOLIN ALCOHOL/MO/W.PET/CERES
800 CREAM (GRAM) TOPICAL
Status: DISCONTINUED | OUTPATIENT
Start: 2025-01-15 | End: 2025-01-19 | Stop reason: HOSPADM

## 2025-01-15 RX ORDER — HYDROCODONE BITARTRATE AND ACETAMINOPHEN 5; 325 MG/1; MG/1
1 TABLET ORAL EVERY 6 HOURS PRN
Status: DISCONTINUED | OUTPATIENT
Start: 2025-01-15 | End: 2025-01-19 | Stop reason: HOSPADM

## 2025-01-15 RX ORDER — ALUMINUM HYDROXIDE, MAGNESIUM HYDROXIDE, AND SIMETHICONE 1200; 120; 1200 MG/30ML; MG/30ML; MG/30ML
30 SUSPENSION ORAL 4 TIMES DAILY PRN
Status: DISCONTINUED | OUTPATIENT
Start: 2025-01-15 | End: 2025-01-19 | Stop reason: HOSPADM

## 2025-01-15 RX ORDER — TALC
6 POWDER (GRAM) TOPICAL NIGHTLY PRN
Status: DISCONTINUED | OUTPATIENT
Start: 2025-01-15 | End: 2025-01-16

## 2025-01-15 RX ORDER — FAMOTIDINE 20 MG/1
20 TABLET, FILM COATED ORAL 2 TIMES DAILY
Status: DISCONTINUED | OUTPATIENT
Start: 2025-01-15 | End: 2025-01-19 | Stop reason: HOSPADM

## 2025-01-15 RX ORDER — ONDANSETRON HYDROCHLORIDE 2 MG/ML
4 INJECTION, SOLUTION INTRAVENOUS EVERY 6 HOURS PRN
Status: DISCONTINUED | OUTPATIENT
Start: 2025-01-15 | End: 2025-01-19 | Stop reason: HOSPADM

## 2025-01-15 RX ORDER — NALOXONE HCL 0.4 MG/ML
0.02 VIAL (ML) INJECTION
Status: DISCONTINUED | OUTPATIENT
Start: 2025-01-15 | End: 2025-01-19 | Stop reason: HOSPADM

## 2025-01-15 RX ADMIN — SODIUM CHLORIDE 1000 MG: 9 INJECTION, SOLUTION INTRAVENOUS at 10:01

## 2025-01-15 RX ADMIN — ENOXAPARIN SODIUM 40 MG: 40 INJECTION SUBCUTANEOUS at 10:01

## 2025-01-15 RX ADMIN — GADOBUTROL 10 ML: 604.72 INJECTION INTRAVENOUS at 07:01

## 2025-01-15 RX ADMIN — ACETAMINOPHEN 1000 MG: 500 TABLET, FILM COATED ORAL at 07:01

## 2025-01-15 RX ADMIN — FAMOTIDINE 20 MG: 20 TABLET, FILM COATED ORAL at 10:01

## 2025-01-15 NOTE — ED PROVIDER NOTES
Encounter Date: 1/15/2025       History     Chief Complaint   Patient presents with    Dysuria     C/o difficulty urinating. No fever     Patient is a 34 y.o. female with past medical history of depression and MOGAD who presents to ED via self for concern for difficulty urinating which began 1 week(s) ago. Patient reports last Monday about a week ago she was having sexual intercourse with her fiance and they tried to new position where she is on her back and she had her legs up on his shoulders.  Patient denies any pain during this encounter but reports that has the only new or different thing that happened prior to her symptoms starting.  Patient reports the next morning she felt the urge to urinate but had difficulty urinating.  Patient reports this has continued for the last week and she feels like she is not emptying her bladder and continues to feel the urge to urinate after urinating.  Patient endorses she has started to get a cold sensation in both her feet that has progressed up both legs into her low back.  Patient endorses numbness and tingling in her low back and posterior legs bilaterally.  Patient endorses pain in her upper back.  Patient denies chest pain or shortness of breath.  Patient denies the numbness or tingling on her front side.  Patient endorses the shakiness in her arms over the last few days.  Patient endorses headaches for the last week with some lightheadedness.  Patient denies dizziness or syncope.  Patient denies currently having headache.  Patient reports she has a headache earlier today that she took aspirin for in his headache went away.  Patient endorses nausea but denies vomiting or diarrhea.  Patient denies abdominal pain.  Patient reports she had bowel incontinence once 4 days ago where she felt the urge to has a bowel movement but it was unable to hold it.  Patient denies fever.  Patient denies illicit drug use or IVDA.  Patient is awake and alert no acute distress.      Review  of patient's allergies indicates:  No Known Allergies  Past Medical History:   Diagnosis Date    Depression      Past Surgical History:   Procedure Laterality Date    TUBAL LIGATION       Family History   Problem Relation Name Age of Onset    Arthritis Mother       Social History     Tobacco Use    Smoking status: Never    Smokeless tobacco: Never   Substance Use Topics    Drug use: Never     Review of Systems   Constitutional:  Negative for fever.   HENT:  Negative for sore throat, trouble swallowing and voice change.    Respiratory: Negative.  Negative for shortness of breath.    Cardiovascular:  Negative for chest pain.   Gastrointestinal:  Positive for nausea. Negative for abdominal distention, diarrhea and vomiting.   Genitourinary:  Positive for difficulty urinating and dysuria. Negative for genital sores, hematuria, menstrual problem, pelvic pain, vaginal bleeding, vaginal discharge and vaginal pain.   Musculoskeletal:  Positive for back pain. Negative for gait problem, joint swelling, myalgias, neck pain and neck stiffness.   Skin: Negative.  Negative for color change, pallor and rash.   Neurological:  Positive for light-headedness, numbness and headaches. Negative for dizziness, tremors, seizures, syncope, facial asymmetry, speech difficulty and weakness.   Hematological:  Does not bruise/bleed easily.       Physical Exam     Initial Vitals [01/15/25 1614]   BP Pulse Resp Temp SpO2   (!) 151/91 (!) 119 17 98.7 °F (37.1 °C) 100 %      MAP       --         Physical Exam    Nursing note and vitals reviewed.  Constitutional: She appears well-developed and well-nourished. She is not diaphoretic. No distress.   HENT:   Head: Normocephalic and atraumatic.   Right Ear: External ear normal.   Left Ear: External ear normal.   Nose: Nose normal.   Eyes: Conjunctivae and EOM are normal.   Neck:   Normal range of motion.  Cardiovascular:  Normal rate, regular rhythm, normal heart sounds and intact distal pulses.      Exam reveals no gallop and no friction rub.       No murmur heard.  Pulmonary/Chest: Breath sounds normal. No respiratory distress. She has no wheezes. She has no rhonchi. She has no rales. She exhibits no tenderness.   Abdominal: Abdomen is soft and protuberant. She exhibits no distension and no mass. There is abdominal tenderness in the right upper quadrant, right lower quadrant, epigastric area and periumbilical area. There is no rebound and no guarding.   Musculoskeletal:         General: Normal range of motion.      Cervical back: Normal and normal range of motion.      Thoracic back: Tenderness and bony tenderness present. No swelling, edema, deformity, signs of trauma or lacerations. Normal range of motion.      Lumbar back: No swelling, edema, deformity, signs of trauma, lacerations, tenderness or bony tenderness. Normal range of motion. Negative right straight leg raise test.     Neurological: She is alert and oriented to person, place, and time. She has normal strength. She is not disoriented. She displays tremor. She displays no atrophy. She exhibits normal muscle tone. She displays a negative Romberg sign. Coordination and gait normal. GCS score is 15. GCS eye subscore is 4. GCS verbal subscore is 5. GCS motor subscore is 6.   Normal finger-to-nose, normal rapid alternating movements    Normal strength and pulses in bilateral upper and lower extremities   Skin: Skin is warm and dry. Capillary refill takes less than 2 seconds.   Psychiatric: She has a normal mood and affect. Her behavior is normal. Judgment and thought content normal.         ED Course   Procedures  Labs Reviewed   URINALYSIS, REFLEX TO URINE CULTURE - Abnormal       Result Value    Specimen UA Urine, Clean Catch      Color, UA Yellow      Appearance, UA Clear      pH, UA 6.0      Specific Gravity, UA 1.015      Protein, UA Negative      Glucose, UA Negative      Ketones, UA Negative      Bilirubin (UA) Negative      Occult Blood UA  Negative      Nitrite, UA Negative      Urobilinogen, UA Negative      Leukocytes, UA 1+ (*)     Narrative:     Specimen Source->Urine   URINALYSIS MICROSCOPIC - Abnormal    RBC, UA 1      WBC, UA 6 (*)     Squam Epithel, UA 2      Microscopic Comment SEE COMMENT      Narrative:     Specimen Source->Urine   COMPREHENSIVE METABOLIC PANEL - Abnormal    Sodium 140      Potassium 3.7      Chloride 108      CO2 24      Glucose 129 (*)     BUN 8      Creatinine 0.7      Calcium 8.5 (*)     Total Protein 7.0      Albumin 3.9      Total Bilirubin 0.4      Alkaline Phosphatase 149 (*)     AST 12      ALT 12      eGFR >60.0      Anion Gap 8     CBC W/ AUTO DIFFERENTIAL    WBC 8.18      RBC 4.37      Hemoglobin 13.1      Hematocrit 39.1      MCV 90      MCH 30.0      MCHC 33.5      RDW 13.0      Platelets 398      MPV 9.5      Immature Granulocytes 0.4      Gran # (ANC) 5.2      Immature Grans (Abs) 0.03      Lymph # 1.9      Mono # 0.7      Eos # 0.3      Baso # 0.07      nRBC 0      Gran % 63.7      Lymph % 23.5      Mono % 8.4      Eosinophil % 3.1      Basophil % 0.9      Differential Method Automated     LIPASE    Lipase 21     CK    CPK 44     MAGNESIUM    Magnesium 1.8     SEDIMENTATION RATE   C-REACTIVE PROTEIN   SEDIMENTATION RATE   C-REACTIVE PROTEIN   POCT URINE PREGNANCY    POC Preg Test, Ur Negative       Acceptable Yes            Imaging Results    None          Medications   acetaminophen tablet 1,000 mg (has no administration in time range)   melatonin tablet 6 mg (has no administration in time range)   ondansetron injection 4 mg (has no administration in time range)   acetaminophen tablet 650 mg (has no administration in time range)   aluminum-magnesium hydroxide-simethicone 200-200-20 mg/5 mL suspension 30 mL (has no administration in time range)   acetaminophen tablet 650 mg (has no administration in time range)   HYDROcodone-acetaminophen 5-325 mg per tablet 1 tablet (has no administration in  time range)   naloxone 0.4 mg/mL injection 0.02 mg (has no administration in time range)   potassium bicarbonate disintegrating tablet 50 mEq (has no administration in time range)   potassium bicarbonate disintegrating tablet 35 mEq (has no administration in time range)   potassium bicarbonate disintegrating tablet 60 mEq (has no administration in time range)   magnesium oxide tablet 800 mg (has no administration in time range)   magnesium oxide tablet 800 mg (has no administration in time range)   potassium, sodium phosphates 280-160-250 mg packet 2 packet (has no administration in time range)   potassium, sodium phosphates 280-160-250 mg packet 2 packet (has no administration in time range)   potassium, sodium phosphates 280-160-250 mg packet 2 packet (has no administration in time range)   enoxaparin injection 40 mg (has no administration in time range)     Medical Decision Making  MDM    Patient presents for emergent evaluation of acute urinary retention that poses a possible threat to life and/or bodily function.    Differential diagnosis included but not limited to UTI, cauda equina, MOGAD exacerbation, electrolyte abnormality, dehydration , rhabdomyolysis, acute kidney injury, stroke.  In the ED patient found to have acute clear lung sounds bilaterally with no increased work of breathing.  Patient has a soft abdomen with generalized tenderness to palpation mostly on the right side of her abdomen.  Patient endorses a dull sensation with a poking with a sharp object on the posterior legs and reports a sharp sensation with a poking on the front of her legs.  Patient reports the sensation is the same in bilateral lower extremities and bilateral upper extremities.  Patient has a normal pulses, cap refill, and sensation distally in all 4 extremities.  Patient has normal strength in all 4 extremities.  Patient is awake and alert in no acute distress.  Patient has a negative Romberg exam.  All of patient's symptoms  are bilaterally.    Labs significant for glucose 129, calcium 8.5, alkaline phosphatase 149, WBC without leukocytosis, CPK 44, magnesium 1.8, lipase 21, UA leukocytes 1+, with WBC 6.  Negative UPT  RN bladder scan was 233.    Patient's symptoms are concerning with her history of MOGAD.  Patient will likely needs Neurology consult and MRI scans.  [facility] medicine consulted for admission.    Admit MDM  I discussed the patient presentation labs, findings with ED attending Dr. Chisholm.    I discussed the patient presentation labs, findings with the consultant Dr. Rodrigez ([facility]) who agreed to admit the patient.    Patient was managed in the ED with oral Tylenol.    The response to treatment was good.    Patient was admitted in stable condition.    NP uses Epic and voice recognition software prone to occasional and minor errors that may persist in the medical record.      Amount and/or Complexity of Data Reviewed  Labs: ordered. Decision-making details documented in ED Course.    Risk  OTC drugs.               ED Course as of 01/15/25 1858   Wed Stefan 15, 2025   1725 Leukocyte Esterase, UA(!): 1+ [MP]   1725 NITRITE UA: Negative [MP]   1725 WBC, UA(!): 6 [MP]   1725 Squam Epithel, UA: 2 [MP]   1822 CBC W/ AUTO DIFFERENTIAL [MP]   1834 Glucose(!): 129 [MP]   1834 Calcium(!): 8.5 [MP]   1834 ALP(!): 149 [MP]   1834 CPK: 44 [MP]   1834 Magnesium : 1.8 [MP]   1834 Lipase: 21 [MP]      ED Course User Index  [MP] Nata Azevedo NP                           Clinical Impression:  Final diagnoses:  [R00.0] Tachycardia  [R33.9] Urinary retention (Primary)  [R20.0, R20.2] Numbness and tingling of both lower extremities  [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

## 2025-01-16 LAB
ALBUMIN SERPL BCP-MCNC: 4 G/DL (ref 3.5–5.2)
ALP SERPL-CCNC: 148 U/L (ref 55–135)
ALT SERPL W/O P-5'-P-CCNC: 12 U/L (ref 10–44)
ANION GAP SERPL CALC-SCNC: 9 MMOL/L (ref 8–16)
AST SERPL-CCNC: 11 U/L (ref 10–40)
BASOPHILS # BLD AUTO: 0.04 K/UL (ref 0–0.2)
BASOPHILS NFR BLD: 0.4 % (ref 0–1.9)
BILIRUB SERPL-MCNC: 0.6 MG/DL (ref 0.1–1)
BUN SERPL-MCNC: 9 MG/DL (ref 6–20)
CALCIUM SERPL-MCNC: 9 MG/DL (ref 8.7–10.5)
CHLORIDE SERPL-SCNC: 106 MMOL/L (ref 95–110)
CO2 SERPL-SCNC: 24 MMOL/L (ref 23–29)
CREAT SERPL-MCNC: 0.7 MG/DL (ref 0.5–1.4)
DIFFERENTIAL METHOD BLD: ABNORMAL
EOSINOPHIL # BLD AUTO: 0 K/UL (ref 0–0.5)
EOSINOPHIL NFR BLD: 0 % (ref 0–8)
ERYTHROCYTE [DISTWIDTH] IN BLOOD BY AUTOMATED COUNT: 12.9 % (ref 11.5–14.5)
EST. GFR  (NO RACE VARIABLE): >60 ML/MIN/1.73 M^2
GLUCOSE SERPL-MCNC: 206 MG/DL (ref 70–110)
HCT VFR BLD AUTO: 39.9 % (ref 37–48.5)
HGB BLD-MCNC: 12.8 G/DL (ref 12–16)
IMM GRANULOCYTES # BLD AUTO: 0.04 K/UL (ref 0–0.04)
IMM GRANULOCYTES NFR BLD AUTO: 0.4 % (ref 0–0.5)
LYMPHOCYTES # BLD AUTO: 0.7 K/UL (ref 1–4.8)
LYMPHOCYTES NFR BLD: 6.9 % (ref 18–48)
MAGNESIUM SERPL-MCNC: 1.8 MG/DL (ref 1.6–2.6)
MCH RBC QN AUTO: 28.5 PG (ref 27–31)
MCHC RBC AUTO-ENTMCNC: 32.1 G/DL (ref 32–36)
MCV RBC AUTO: 89 FL (ref 82–98)
MONOCYTES # BLD AUTO: 0 K/UL (ref 0.3–1)
MONOCYTES NFR BLD: 0.3 % (ref 4–15)
NEUTROPHILS # BLD AUTO: 9 K/UL (ref 1.8–7.7)
NEUTROPHILS NFR BLD: 92 % (ref 38–73)
NRBC BLD-RTO: 0 /100 WBC
PLATELET # BLD AUTO: 417 K/UL (ref 150–450)
PMV BLD AUTO: 9.9 FL (ref 9.2–12.9)
POTASSIUM SERPL-SCNC: 3.8 MMOL/L (ref 3.5–5.1)
PROT SERPL-MCNC: 7.2 G/DL (ref 6–8.4)
RBC # BLD AUTO: 4.49 M/UL (ref 4–5.4)
SODIUM SERPL-SCNC: 139 MMOL/L (ref 136–145)
WBC # BLD AUTO: 9.74 K/UL (ref 3.9–12.7)

## 2025-01-16 PROCEDURE — G0378 HOSPITAL OBSERVATION PER HR: HCPCS

## 2025-01-16 PROCEDURE — A9585 GADOBUTROL INJECTION: HCPCS

## 2025-01-16 PROCEDURE — 85025 COMPLETE CBC W/AUTO DIFF WBC: CPT | Performed by: INTERNAL MEDICINE

## 2025-01-16 PROCEDURE — 25000003 PHARM REV CODE 250: Performed by: PSYCHIATRY & NEUROLOGY

## 2025-01-16 PROCEDURE — 93010 ELECTROCARDIOGRAM REPORT: CPT | Mod: ,,, | Performed by: GENERAL PRACTICE

## 2025-01-16 PROCEDURE — 93005 ELECTROCARDIOGRAM TRACING: CPT | Performed by: GENERAL PRACTICE

## 2025-01-16 PROCEDURE — 12000002 HC ACUTE/MED SURGE SEMI-PRIVATE ROOM

## 2025-01-16 PROCEDURE — 96372 THER/PROPH/DIAG INJ SC/IM: CPT | Mod: 59 | Performed by: INTERNAL MEDICINE

## 2025-01-16 PROCEDURE — 63600175 PHARM REV CODE 636 W HCPCS: Mod: JZ,TB | Performed by: PSYCHIATRY & NEUROLOGY

## 2025-01-16 PROCEDURE — 51701 INSERT BLADDER CATHETER: CPT

## 2025-01-16 PROCEDURE — 25500020 PHARM REV CODE 255

## 2025-01-16 PROCEDURE — 51798 US URINE CAPACITY MEASURE: CPT

## 2025-01-16 PROCEDURE — 63600175 PHARM REV CODE 636 W HCPCS: Performed by: INTERNAL MEDICINE

## 2025-01-16 PROCEDURE — 36415 COLL VENOUS BLD VENIPUNCTURE: CPT | Performed by: INTERNAL MEDICINE

## 2025-01-16 PROCEDURE — 83735 ASSAY OF MAGNESIUM: CPT | Performed by: INTERNAL MEDICINE

## 2025-01-16 PROCEDURE — 99223 1ST HOSP IP/OBS HIGH 75: CPT | Mod: ,,, | Performed by: PSYCHIATRY & NEUROLOGY

## 2025-01-16 PROCEDURE — 25000003 PHARM REV CODE 250: Performed by: INTERNAL MEDICINE

## 2025-01-16 PROCEDURE — 80053 COMPREHEN METABOLIC PANEL: CPT | Performed by: INTERNAL MEDICINE

## 2025-01-16 RX ORDER — GABAPENTIN 300 MG/1
300 CAPSULE ORAL 3 TIMES DAILY
Status: DISCONTINUED | OUTPATIENT
Start: 2025-01-16 | End: 2025-01-19 | Stop reason: HOSPADM

## 2025-01-16 RX ORDER — ZOLPIDEM TARTRATE 5 MG/1
5 TABLET ORAL NIGHTLY PRN
Status: DISCONTINUED | OUTPATIENT
Start: 2025-01-16 | End: 2025-01-19 | Stop reason: HOSPADM

## 2025-01-16 RX ORDER — GADOBUTROL 604.72 MG/ML
10 INJECTION INTRAVENOUS
Status: COMPLETED | OUTPATIENT
Start: 2025-01-16 | End: 2025-01-16

## 2025-01-16 RX ORDER — BACLOFEN 5 MG/1
5 TABLET ORAL 3 TIMES DAILY PRN
Status: DISCONTINUED | OUTPATIENT
Start: 2025-01-16 | End: 2025-01-19 | Stop reason: HOSPADM

## 2025-01-16 RX ADMIN — SODIUM CHLORIDE 1000 MG: 900 INJECTION INTRAVENOUS at 03:01

## 2025-01-16 RX ADMIN — HYDROCODONE BITARTRATE AND ACETAMINOPHEN 1 TABLET: 5; 325 TABLET ORAL at 07:01

## 2025-01-16 RX ADMIN — FAMOTIDINE 20 MG: 20 TABLET, FILM COATED ORAL at 08:01

## 2025-01-16 RX ADMIN — GABAPENTIN 300 MG: 300 CAPSULE ORAL at 09:01

## 2025-01-16 RX ADMIN — ENOXAPARIN SODIUM 40 MG: 40 INJECTION SUBCUTANEOUS at 04:01

## 2025-01-16 RX ADMIN — FAMOTIDINE 20 MG: 20 TABLET, FILM COATED ORAL at 09:01

## 2025-01-16 RX ADMIN — GADOBUTROL 10 ML: 604.72 INJECTION INTRAVENOUS at 08:01

## 2025-01-16 NOTE — PLAN OF CARE
[facility]  Initial Discharge Assessment    Assessment completed at bedside with Pt, and all information on FaceSheet confirmed, including demographics, PCP, pharmacy and insurance. Pt has not addressed advance directives. She currently lives with her spouse in [place]. Pt does not currently have a PCP and said her last appointment was last year. She denies any DME/HH/HD/Blood Thinners. Papo Barber (Significant other) [phone] (Mobile) will transport her back home at discharge. She denies any recent hospitalizations. Plan for discharge is to return home. Case Management to continue to follow for discharge planning needs.        Primary Care Provider: No, Primary Doctor    Admission Diagnosis: Myelin oligodendrocyte glycoprotein antibody disorder (MOGAD) [G37.81]    Admission Date: 1/15/2025  Expected Discharge Date: 1/19/2025    Transition of Care Barriers: None    Payor: MEDICAID / Plan: [employer] / Product Type: Managed Medicaid /     Extended Emergency Contact Information  Primary Emergency Contact: Papo Barber  Address: [address] [place] [address] [place]  Mobile Phone: [phone]  Relation: Significant other  Preferred language: English   needed? No    Discharge Plan A: Home with family  Discharge Plan B: Home with family      [id number] - [place], [place] - [address].  [address] [place] [address]  Phone: [phone] Fax: [phone]      Initial Assessment (most recent)       Adult Discharge Assessment - 01/16/25 1411          Discharge Assessment    Assessment Type Discharge Planning Assessment     Confirmed/corrected address, phone number and insurance Yes     Confirmed Demographics Correct on Facesheet     Source of Information patient     When was your last doctors appointment? --   last year    Does patient/caregiver understand observation status Yes     Reason For Admission Myelin  oligodendrocyte glycoprotein antibody disorder (MOGAD)     People in Home spouse     Do you expect to return to your current living situation? Yes     Do you have help at home or someone to help you manage your care at home? Yes     Who are your caregiver(s) and their phone number(s)? Papo Barber (Significant other)  [phone] (Mobile)     Prior to hospitilization cognitive status: Alert/Oriented     Current cognitive status: Alert/Oriented     Walking or Climbing Stairs Difficulty no     Dressing/Bathing Difficulty no     Home Accessibility not wheelchair accessible     Home Layout Able to live on 1st floor     Equipment Currently Used at Home none     Readmission within 30 days? No     Patient currently being followed by outpatient case management? No     Do you currently have service(s) that help you manage your care at home? No     Do you take prescription medications? Yes     Do you have prescription coverage? Yes     Coverage Payor: MEDICAID - [employer] -     Do you have any problems affording any of your prescribed medications? No     Is the patient taking medications as prescribed? yes     Who is going to help you get home at discharge? Papo Barber (Significant other)  [phone] (Mobile)     How do you get to doctors appointments? car, drives self     Are you on dialysis? No     Do you take coumadin? No     Discharge Plan A Home with family     Discharge Plan B Home with family     DME Needed Upon Discharge  none     Discharge Plan discussed with: Patient     Transition of Care Barriers None

## 2025-01-16 NOTE — SUBJECTIVE & OBJECTIVE
Past Medical History:   Diagnosis Date    Depression        Past Surgical History:   Procedure Laterality Date    TUBAL LIGATION         Review of patient's allergies indicates:  No Known Allergies    No current facility-administered medications on file prior to encounter.     No current outpatient medications on file prior to encounter.     Family History       Problem Relation (Age of Onset)    Arthritis Mother          Tobacco Use    Smoking status: Never    Smokeless tobacco: Never   Substance and Sexual Activity    Alcohol use: Not on file    Drug use: Never    Sexual activity: Not on file     Review of Systems   Constitutional:  Negative for activity change and appetite change.   HENT:  Negative for congestion and dental problem.    Eyes:  Negative for discharge and itching.   Respiratory:  Negative for shortness of breath.    Cardiovascular:  Negative for chest pain.   Gastrointestinal:  Negative for abdominal distention and abdominal pain.   Endocrine: Negative for cold intolerance.   Genitourinary:  Negative for difficulty urinating and dysuria.   Musculoskeletal:  Negative for arthralgias and back pain.   Skin:  Negative for color change.   Neurological:  Positive for weakness and numbness. Negative for dizziness and facial asymmetry.   Hematological:  Negative for adenopathy.   Psychiatric/Behavioral:  Negative for agitation and behavioral problems.      Objective:     Vital Signs (Most Recent):  Temp: 98.7 °F (37.1 °C) (01/15/25 1614)  Pulse: (!) 112 (01/15/25 2119)  Resp: 17 (01/15/25 2119)  BP: (!) 149/97 (01/15/25 2119)  SpO2: 99 % (01/15/25 2119) Vital Signs (24h Range):  Temp:  [98.7 °F (37.1 °C)] 98.7 °F (37.1 °C)  Pulse:  [100-119] 112  Resp:  [17] 17  SpO2:  [99 %-100 %] 99 %  BP: (149-151)/(91-97) 149/97     Weight: 99.8 kg (220 lb)  Body mass index is 38.97 kg/m².     Physical Exam  Vitals and nursing note reviewed.   Constitutional:       General: She is not in acute distress.  HENT:       Head: Atraumatic.      Right Ear: External ear normal.      Left Ear: External ear normal.      Nose: Nose normal.      Mouth/Throat:      Mouth: Mucous membranes are moist.   Cardiovascular:      Rate and Rhythm: Normal rate.   Pulmonary:      Effort: Pulmonary effort is normal.   Musculoskeletal:         General: Normal range of motion.      Cervical back: Normal range of motion.   Skin:     General: Skin is dry.   Neurological:      Mental Status: She is alert and oriented to person, place, and time.                Significant Labs: All pertinent labs within the past 24 hours have been reviewed.  CBC:   Recent Labs   Lab 01/15/25  1739   WBC 8.18   HGB 13.1   HCT 39.1        CMP:   Recent Labs   Lab 01/15/25  1739      K 3.7      CO2 24   *   BUN 8   CREATININE 0.7   CALCIUM 8.5*   PROT 7.0   ALBUMIN 3.9   BILITOT 0.4   ALKPHOS 149*   AST 12   ALT 12   ANIONGAP 8       Significant Imaging: I have reviewed all pertinent imaging results/findings within the past 24 hours.

## 2025-01-16 NOTE — PROGRESS NOTES
[facility]  Progress Note    Patient Name: Dalia Collado  MRN: [id number]  Patient Class: OP- Observation   Admission Date: 1/15/2025  Length of Stay: 0 days  Attending Physician: Aliya Jacobs MD  Primary Care Provider: [place], Primary Doctor        Subjective     Principal Problem:Myelin oligodendrocyte glycoprotein antibody disorder (MOGAD)        HPI:  34 year old pt getting admitted with possible complications from MOGAD  Pt was diagnosed with bilateral optic neuritis and subsequently MOGAD on Nov 2024  Pt was symptoms free since then until 1 week ago    Started having difficulty in urination and urge to urinate again and again 1 week ago  This was followed by numbness/tingling/cold sensation on legs  Pt tried to contact her Neurologist and seems like Neuro MD left the practice and moved out of state  She came to ER and got admitted         Overview/Hospital Course:  No notes on file    Interval History:  Patient seen and examined this morning.  He reports her pain is mainly in her feet.  She is without any dyspnea, chest pain, subjective fever or chills.  Pending formal Neurology consultation.    Review of Systems   Constitutional:  Negative for activity change and appetite change.   HENT:  Negative for congestion and dental problem.    Eyes:  Negative for discharge and itching.   Respiratory:  Negative for shortness of breath.    Cardiovascular:  Negative for chest pain.   Gastrointestinal:  Negative for abdominal distention and abdominal pain.   Endocrine: Negative for cold intolerance.   Genitourinary:  Negative for difficulty urinating and dysuria.   Musculoskeletal:  Negative for arthralgias and back pain.   Skin:  Negative for color change.   Neurological:  Positive for weakness and numbness. Negative for dizziness and facial asymmetry.   Hematological:  Negative for adenopathy.   Psychiatric/Behavioral:  Negative for agitation and behavioral problems.       Objective:     Vital Signs (Most Recent):  Temp: 98.4 °F (36.9 °C) (01/16/25 1107)  Pulse: 103 (01/16/25 1107)  Resp: 15 (01/16/25 1107)  BP: 124/81 (01/16/25 1107)  SpO2: 98 % (01/16/25 1107) Vital Signs (24h Range):  Temp:  [98.4 °F (36.9 °C)-99.1 °F (37.3 °C)] 98.4 °F (36.9 °C)  Pulse:  [] 103  Resp:  [14-21] 15  SpO2:  [96 %-100 %] 98 %  BP: (124-151)/(65-97) 124/81     Weight: 110.8 kg (244 lb 4.8 oz)  Body mass index is 43.28 kg/m².    Intake/Output Summary (Last 24 hours) at 1/16/2025 1444  Last data filed at 1/16/2025 1243  Gross per 24 hour   Intake 480 ml   Output 1758 ml   Net -1278 ml         Physical Exam  Vitals and nursing note reviewed.   Constitutional:       General: She is not in acute distress.  HENT:      Head: Atraumatic.      Right Ear: External ear normal.      Left Ear: External ear normal.      Nose: Nose normal.      Mouth/Throat:      Mouth: Mucous membranes are moist.   Cardiovascular:      Rate and Rhythm: Normal rate.   Pulmonary:      Effort: Pulmonary effort is normal.   Musculoskeletal:         General: Normal range of motion.      Cervical back: Normal range of motion.   Skin:     General: Skin is dry.   Neurological:      Mental Status: She is alert and oriented to person, place, and time.             Significant Labs: All pertinent labs within the past 24 hours have been reviewed.  BMP:   Recent Labs   Lab 01/16/25  0445   *      K 3.8      CO2 24   BUN 9   CREATININE 0.7   CALCIUM 9.0   MG 1.8     CBC:   Recent Labs   Lab 01/15/25  1739 01/16/25  0445   WBC 8.18 9.74   HGB 13.1 12.8   HCT 39.1 39.9    417       Significant Imaging: I have reviewed all pertinent imaging results/findings within the past 24 hours.    Assessment and Plan     * Myelin oligodendrocyte glycoprotein antibody disorder (MOGAD)  Getting admitted with possible complications from MOGAD  MRI spine pending  Formal neurology consultation pending, getting high dose  steroids      Morbid obesity  Body mass index is 38.97 kg/m². Morbid obesity complicates all aspects of disease management from diagnostic modalities to treatment.     Optic neuritis  H/o aware         VTE Risk Mitigation (From admission, onward)           Ordered     enoxaparin injection 40 mg  Daily         01/15/25 1852     IP VTE HIGH RISK PATIENT  Once         01/15/25 1852     Place sequential compression device  Until discontinued         01/15/25 1852                    Discharge Planning   JORDEN: 1/19/2025     Code Status: Full Code   Medical Readiness for Discharge Date:   Discharge Plan A: Home with family   Discharge Delays: (!) Procedure Scheduling (IR, OR, Labs, Echo, Cath, Echo, EEG)                    Aliya Jcaobs MD  Department of Hospital Medicine   [facility]

## 2025-01-16 NOTE — H&P
[facility] - Emergency Dept  Hospital Medicine  History & Physical    Patient Name: Dalia Collado  MRN: [id number]  Patient Class: OP- Observation  Admission Date: 1/15/2025  Attending Physician: Tarik Rodrigez MD   Primary Care Provider: [facility]         Patient information was obtained from patient, past medical records, and ER records.     Subjective:     Principal Problem:Myelin oligodendrocyte glycoprotein antibody disorder (MOGAD)    Chief Complaint:   Chief Complaint   Patient presents with    Dysuria     C/o difficulty urinating. No fever        HPI: 34 year old pt getting admitted with possible complications from MOGAD  Pt was diagnosed with bilateral optic neuritis and subsequently MOGAD on Nov 2024  Pt was symptoms free since then until 1 week ago    Started having difficulty in urination and urge to urinate again and again 1 week ago  This was followed by numbness/tingling/cold sensation on legs  Pt tried to contact her Neurologist and seems like Neuro MD left the practice and moved out of state  She came to ER and got admitted         Past Medical History:   Diagnosis Date    Depression        Past Surgical History:   Procedure Laterality Date    TUBAL LIGATION         Review of patient's allergies indicates:  No Known Allergies    No current facility-administered medications on file prior to encounter.     No current outpatient medications on file prior to encounter.     Family History       Problem Relation (Age of Onset)    Arthritis Mother          Tobacco Use    Smoking status: Never    Smokeless tobacco: Never   Substance and Sexual Activity    Alcohol use: Not on file    Drug use: Never    Sexual activity: Not on file     Review of Systems   Constitutional:  Negative for activity change and appetite change.   HENT:  Negative for congestion and dental problem.    Eyes:  Negative for discharge and itching.   Respiratory:  Negative for shortness of breath.     Cardiovascular:  Negative for chest pain.   Gastrointestinal:  Negative for abdominal distention and abdominal pain.   Endocrine: Negative for cold intolerance.   Genitourinary:  Negative for difficulty urinating and dysuria.   Musculoskeletal:  Negative for arthralgias and back pain.   Skin:  Negative for color change.   Neurological:  Positive for weakness and numbness. Negative for dizziness and facial asymmetry.   Hematological:  Negative for adenopathy.   Psychiatric/Behavioral:  Negative for agitation and behavioral problems.      Objective:     Vital Signs (Most Recent):  Temp: 98.7 °F (37.1 °C) (01/15/25 1614)  Pulse: (!) 112 (01/15/25 2119)  Resp: 17 (01/15/25 2119)  BP: (!) 149/97 (01/15/25 2119)  SpO2: 99 % (01/15/25 2119) Vital Signs (24h Range):  Temp:  [98.7 °F (37.1 °C)] 98.7 °F (37.1 °C)  Pulse:  [100-119] 112  Resp:  [17] 17  SpO2:  [99 %-100 %] 99 %  BP: (149-151)/(91-97) 149/97     Weight: 99.8 kg (220 lb)  Body mass index is 38.97 kg/m².     Physical Exam  Vitals and nursing note reviewed.   Constitutional:       General: She is not in acute distress.  HENT:      Head: Atraumatic.      Right Ear: External ear normal.      Left Ear: External ear normal.      Nose: Nose normal.      Mouth/Throat:      Mouth: Mucous membranes are moist.   Cardiovascular:      Rate and Rhythm: Normal rate.   Pulmonary:      Effort: Pulmonary effort is normal.   Musculoskeletal:         General: Normal range of motion.      Cervical back: Normal range of motion.   Skin:     General: Skin is dry.   Neurological:      Mental Status: She is alert and oriented to person, place, and time.                Significant Labs: All pertinent labs within the past 24 hours have been reviewed.  CBC:   Recent Labs   Lab 01/15/25  1739   WBC 8.18   HGB 13.1   HCT 39.1        CMP:   Recent Labs   Lab 01/15/25  1739      K 3.7      CO2 24   *   BUN 8   CREATININE 0.7   CALCIUM 8.5*   PROT 7.0   ALBUMIN 3.9    BILITOT 0.4   ALKPHOS 149*   AST 12   ALT 12   ANIONGAP 8       Significant Imaging: I have reviewed all pertinent imaging results/findings within the past 24 hours.    Assessment/Plan:     * Myelin oligodendrocyte glycoprotein antibody disorder (MOGAD)  Getting admitted with possible complications from MOGAD  MRI  brain W WO Contrast  1000 mg iv solumedrol now  Consulted Neuro MD  May need extensive imaging of spine too       Optic neuritis  H/o aware       Morbid obesity  Body mass index is 38.97 kg/m². Morbid obesity complicates all aspects of disease management from diagnostic modalities to treatment.       VTE Risk Mitigation (From admission, onward)           Ordered     enoxaparin injection 40 mg  Daily         01/15/25 1852     IP VTE HIGH RISK PATIENT  Once         01/15/25 1852     Place sequential compression device  Until discontinued         01/15/25 1852                       On 01/15/2025, patient should be placed in hospital observation services under my care.             Tarik Rodrigez MD  Department of Hospital Medicine  [facility] - Emergency Dept

## 2025-01-16 NOTE — PLAN OF CARE
Per MRI tech, patient to wait 24 hrs since last MRI d/t contrast given.      01/16/25 1300   Post-Acute Status   Discharge Delays (!) Procedure Scheduling (IR, OR, Labs, Echo, Cath, Echo, EEG)

## 2025-01-16 NOTE — HPI
34 year old pt getting admitted with possible complications from MOGAD  Pt was diagnosed with bilateral optic neuritis and subsequently MOGAD on Nov 2024  Pt was symptoms free since then until 1 week ago    Started having difficulty in urination and urge to urinate again and again 1 week ago  This was followed by numbness/tingling/cold sensation on legs  Pt tried to contact her Neurologist and seems like Neuro MD left the practice and moved out of state  She came to ER and got admitted

## 2025-01-16 NOTE — ASSESSMENT & PLAN NOTE
Getting admitted with possible complications from MOGAD  MRI  brain W WO Contrast  1000 mg iv solumedrol now  Consulted Neuro MD  May need extensive imaging of spine too

## 2025-01-16 NOTE — ASSESSMENT & PLAN NOTE
Body mass index is 38.97 kg/m². Morbid obesity complicates all aspects of disease management from diagnostic modalities to treatment.

## 2025-01-16 NOTE — ASSESSMENT & PLAN NOTE
Getting admitted with possible complications from MOGAD  MRI spine pending  Formal neurology consultation pending, getting high dose steroids

## 2025-01-16 NOTE — CARE UPDATE
Preliminary chart review, formal consultation to follow    34-year-old woman with diagnosis of MOGAD (antibody positive, 98% specificity) presenting with one-week history of dysuria, urgency x1 week, followed by paresthesias involving the posterior aspect of her legs bilaterally.  She was given 1 g IV methylprednisolone in the ER yesterday in the 15th.    MRI of the brain with and without contrast was obtained which does not show any acute lesion or abnormal enhancement (personal review).  We will try to obtain MRI of the thoracic and lumbar spine, as the symptoms she endorses localizes to the conus.  May have to wait given exposure to contrast last night.    Check postvoid residuals, continue steroids over the next 2-4 days  PT assessment, assess for gait abnormalities  Formal consult to follow today

## 2025-01-17 LAB
ALBUMIN SERPL BCP-MCNC: 4 G/DL (ref 3.5–5.2)
ALP SERPL-CCNC: 131 U/L (ref 55–135)
ALT SERPL W/O P-5'-P-CCNC: 10 U/L (ref 10–44)
ANION GAP SERPL CALC-SCNC: 9 MMOL/L (ref 8–16)
AST SERPL-CCNC: 9 U/L (ref 10–40)
BASOPHILS # BLD AUTO: 0.02 K/UL (ref 0–0.2)
BASOPHILS NFR BLD: 0.1 % (ref 0–1.9)
BILIRUB SERPL-MCNC: 0.4 MG/DL (ref 0.1–1)
BUN SERPL-MCNC: 17 MG/DL (ref 6–20)
CALCIUM SERPL-MCNC: 9.8 MG/DL (ref 8.7–10.5)
CHLORIDE SERPL-SCNC: 106 MMOL/L (ref 95–110)
CO2 SERPL-SCNC: 25 MMOL/L (ref 23–29)
CREAT SERPL-MCNC: 0.8 MG/DL (ref 0.5–1.4)
DIFFERENTIAL METHOD BLD: ABNORMAL
EOSINOPHIL # BLD AUTO: 0 K/UL (ref 0–0.5)
EOSINOPHIL NFR BLD: 0 % (ref 0–8)
ERYTHROCYTE [DISTWIDTH] IN BLOOD BY AUTOMATED COUNT: 13.2 % (ref 11.5–14.5)
EST. GFR  (NO RACE VARIABLE): >60 ML/MIN/1.73 M^2
GLUCOSE SERPL-MCNC: 154 MG/DL (ref 70–110)
HCT VFR BLD AUTO: 42 % (ref 37–48.5)
HGB BLD-MCNC: 13.4 G/DL (ref 12–16)
IMM GRANULOCYTES # BLD AUTO: 0.11 K/UL (ref 0–0.04)
IMM GRANULOCYTES NFR BLD AUTO: 0.7 % (ref 0–0.5)
LYMPHOCYTES # BLD AUTO: 1 K/UL (ref 1–4.8)
LYMPHOCYTES NFR BLD: 6.4 % (ref 18–48)
MAGNESIUM SERPL-MCNC: 2 MG/DL (ref 1.6–2.6)
MCH RBC QN AUTO: 29.7 PG (ref 27–31)
MCHC RBC AUTO-ENTMCNC: 31.9 G/DL (ref 32–36)
MCV RBC AUTO: 93 FL (ref 82–98)
MONOCYTES # BLD AUTO: 0.5 K/UL (ref 0.3–1)
MONOCYTES NFR BLD: 2.8 % (ref 4–15)
NEUTROPHILS # BLD AUTO: 14.6 K/UL (ref 1.8–7.7)
NEUTROPHILS NFR BLD: 90 % (ref 38–73)
NRBC BLD-RTO: 0 /100 WBC
PLATELET # BLD AUTO: 395 K/UL (ref 150–450)
PMV BLD AUTO: 9.6 FL (ref 9.2–12.9)
POCT GLUCOSE: 159 MG/DL (ref 70–110)
POCT GLUCOSE: 171 MG/DL (ref 70–110)
POCT GLUCOSE: 219 MG/DL (ref 70–110)
POTASSIUM SERPL-SCNC: 4 MMOL/L (ref 3.5–5.1)
PROT SERPL-MCNC: 7.3 G/DL (ref 6–8.4)
RBC # BLD AUTO: 4.51 M/UL (ref 4–5.4)
SODIUM SERPL-SCNC: 140 MMOL/L (ref 136–145)
WBC # BLD AUTO: 16.19 K/UL (ref 3.9–12.7)

## 2025-01-17 PROCEDURE — 82962 GLUCOSE BLOOD TEST: CPT

## 2025-01-17 PROCEDURE — 12000002 HC ACUTE/MED SURGE SEMI-PRIVATE ROOM

## 2025-01-17 PROCEDURE — 97161 PT EVAL LOW COMPLEX 20 MIN: CPT

## 2025-01-17 PROCEDURE — 63600175 PHARM REV CODE 636 W HCPCS: Performed by: INTERNAL MEDICINE

## 2025-01-17 PROCEDURE — 25000003 PHARM REV CODE 250: Performed by: INTERNAL MEDICINE

## 2025-01-17 PROCEDURE — 63600175 PHARM REV CODE 636 W HCPCS: Mod: JZ,TB | Performed by: PSYCHIATRY & NEUROLOGY

## 2025-01-17 PROCEDURE — 36415 COLL VENOUS BLD VENIPUNCTURE: CPT | Performed by: INTERNAL MEDICINE

## 2025-01-17 PROCEDURE — 80053 COMPREHEN METABOLIC PANEL: CPT | Performed by: INTERNAL MEDICINE

## 2025-01-17 PROCEDURE — 25000003 PHARM REV CODE 250: Performed by: PSYCHIATRY & NEUROLOGY

## 2025-01-17 PROCEDURE — 85025 COMPLETE CBC W/AUTO DIFF WBC: CPT | Performed by: INTERNAL MEDICINE

## 2025-01-17 PROCEDURE — 83735 ASSAY OF MAGNESIUM: CPT | Performed by: INTERNAL MEDICINE

## 2025-01-17 RX ADMIN — ZOLPIDEM TARTRATE 5 MG: 5 TABLET, FILM COATED ORAL at 09:01

## 2025-01-17 RX ADMIN — SODIUM CHLORIDE 1000 MG: 900 INJECTION INTRAVENOUS at 09:01

## 2025-01-17 RX ADMIN — ENOXAPARIN SODIUM 40 MG: 40 INJECTION SUBCUTANEOUS at 04:01

## 2025-01-17 RX ADMIN — FAMOTIDINE 20 MG: 20 TABLET, FILM COATED ORAL at 09:01

## 2025-01-17 RX ADMIN — GABAPENTIN 300 MG: 300 CAPSULE ORAL at 09:01

## 2025-01-17 RX ADMIN — FAMOTIDINE 20 MG: 20 TABLET, FILM COATED ORAL at 08:01

## 2025-01-17 RX ADMIN — GABAPENTIN 300 MG: 300 CAPSULE ORAL at 02:01

## 2025-01-17 RX ADMIN — GABAPENTIN 300 MG: 300 CAPSULE ORAL at 08:01

## 2025-01-17 NOTE — ASSESSMENT & PLAN NOTE
Getting admitted with possible complications from MOGAD  MRI spine showing detrimental interval development of diffuse cord signal alteration involving the majority of the visualized portion of the lower cervical and thoracic spinal cord associated with mild postcontrast enhancement.  The findings are consistent with active demyelinating process/transverse myelitis.   Neurology following continuing high dose steroids, 5 day course  Did well with physical therapy  Full strength throughout

## 2025-01-17 NOTE — HOSPITAL COURSE
This is a 34-year-old lady previous diagnosis of myelin oligodendrocyte glycoprotein antibody disorder with recent presentation optic neuritis given pulse steroids in November.  She did well for several weeks until 1 week ago when she is mainly complaining of urinary retention, spasticity and numbness and tingling of her bilateral legs and feet.  She is admitted for the same.  Neurologist is consulted inpatient is again initiated on pulse dose steroids.  She was given 5 days of 1000 mg she has continued to be seen by Neurology.  She was also seen by Physical therapy.  Her symptoms have largely improved.  She does have some residual numbness and tingling in her feet.  She is initiated on and gabapentin for symptomatic relief.  Also sent with a prescription for vitamin-D.  She will see Dr. Stewart [facility] and follow up.  She was also sent for ambulatory referral for Urology urinary retention although she never retained more than 250 cc in his able to void.  Directions were given to the patient regarding her steroid taper and these were sent to her pharmacy.  She is seen and examined prior to discharge in stable condition to do so.

## 2025-01-17 NOTE — HPI
34-year-old woman with diagnosis of MOGAD (antibody positive, 98% specificity) presenting with one-week history of dysuria, urgency x1 week, followed by paresthesias involving the posterior aspect of her legs bilaterally.  She was given 1 g IV methylprednisolone in the ER yesterday in the 15th.     MRI of the brain with and without contrast was obtained which does not show any acute lesion or abnormal enhancement (personal review).  We will try to obtain MRI of the thoracic and lumbar spine, as the symptoms she endorses localizes to the conus.  May have to wait given exposure to contrast last night.    Since November of 2024, she has had the onset of neurological symptoms suggestive of demyelinating disease.  Initially presented with an episode of optic neuritis on the left, this was relatively mild and responded well to treatment.  She then had a 2nd episode of optic neuritis involving the right eye, this time more severely impairing her vision on the right eye as well as partially on the left.  After treatment with IV steroids however she did ultimately improve, and feels that at this point her vision is close if not fully back to baseline.    Over the past 7-10 days, she has noted progressive difficulty urinating, painful paresthesias involving the posterior aspects of left more so than right legs, and some intermittent electric shock-like paresthesias involving the right side of her chest.  With respect to her urination, she notes that it is becoming increasingly more difficult to void; she is able to appreciate feeling full, but often has difficulty initiating her stream, and feels she has only incompletely emptying her bladder.  This will result in her urinating small volumes more frequently.  She denies any painful cramps or spasms.  She has not had any urinary incontinence, but did have 1 episode of bowel incontinence, felt the urge but was unable to hold it.  She does not have any history of kidney  stones, no hematuria.    Paresthesias involve a painful, both sharp pins and needles and throbbing pain in the bottoms of her feet, up the posterior aspects of her calves and thighs up into her buttocks.  She has some intermittent, relatively milder residual headache or eye pain, does not appreciate is consistently brought on with eye movement or position.  She may experience some muscle tightness and jerks or spasms of her muscles at times.  She denies any cognitive issues

## 2025-01-17 NOTE — PT/OT/SLP EVAL
Physical Therapy Evaluation and Discharge Note    Patient Name:  Dalia Collado   MRN:  [id number]    Recommendations:     Discharge Recommendations: No Therapy Indicated  Discharge Equipment Recommendations: none   Barriers to discharge: None    Assessment:     Dalia Collado is a 34 y.o. female admitted with a medical diagnosis of Myelin oligodendrocyte glycoprotein antibody disorder (MOGAD). .  At this time, patient is functioning at their prior level of function and does not require further acute PT services.     Recent Surgery: * No surgery found *      Plan:     During this hospitalization, patient does not require further acute PT services.  Please re-consult if situation changes.      Subjective     Chief Complaint: no  Patient/Family Comments/goals: Return home with   Pain/Comfort:       Patients cultural, spiritual, Religion conflicts given the current situation:      Living Environment:  Pt lives at home with her   Prior to admission, patients level of function was independent .  Equipment used at home: none.  DME owned (not currently used): none.  Upon discharge, patient will have assistance from her spouse.    Objective:     Communicated with nurse prior to session.  Patient found supine with   upon PT entry to room.    General Precautions: Standard,      Orthopedic Precautions:    Braces:    Respiratory Status: Room air    Exams:  Cognitive Exam:  Patient is oriented to Person, Place, Time, and Situation  RLE Strength: WNL  LLE Strength: WNL    Functional Mobility:  Bed Mobility:     Supine to Sit: independence  Sit to Supine: independence  Transfers:     Sit to Stand:  independence with no AD  Gait: 50 ft independent    AM-PAC 6 CLICK MOBILITY  Total Score:        Treatment and Education:  PT eval and DC    AM-PAC 6 CLICK MOBILITY  Total Score:      Patient left supine with all lines intact and call button in reach.    GOALS:   Multidisciplinary Problems       Physical Therapy  Goals       Not on file                    History:     Past Medical History:   Diagnosis Date    Depression        Past Surgical History:   Procedure Laterality Date    TUBAL LIGATION         Time Tracking:     PT Received On: 01/17/25  PT Start Time: 0940     PT Stop Time: 0948  PT Total Time (min): 8 min     Billable Minutes: Evaluation 8 minutes      01/17/2025

## 2025-01-17 NOTE — SUBJECTIVE & OBJECTIVE
Interval History:  Patient is seen and examined this morning.  She is resting comfortably in no acute distress.  She reports he only symptoms remaining are bilateral feet numbness.  She does wishes to stay in the hospital to complete her 5 day course.    Review of Systems   Constitutional:  Negative for activity change and appetite change.   HENT:  Negative for congestion and dental problem.    Eyes:  Negative for discharge and itching.   Respiratory:  Negative for shortness of breath.    Cardiovascular:  Negative for chest pain.   Gastrointestinal:  Negative for abdominal distention and abdominal pain.   Endocrine: Negative for cold intolerance.   Genitourinary:  Negative for difficulty urinating and dysuria.   Musculoskeletal:  Negative for arthralgias and back pain.   Skin:  Negative for color change.   Neurological:  Positive for weakness and numbness. Negative for dizziness and facial asymmetry.   Hematological:  Negative for adenopathy.   Psychiatric/Behavioral:  Negative for agitation and behavioral problems.      Objective:     Vital Signs (Most Recent):  Temp: 98.1 °F (36.7 °C) (01/17/25 1123)  Pulse: 102 (01/17/25 1123)  Resp: 15 (01/17/25 1123)  BP: (!) 139/93 (01/17/25 1123)  SpO2: 100 % (01/17/25 1123) Vital Signs (24h Range):  Temp:  [97.7 °F (36.5 °C)-98.4 °F (36.9 °C)] 98.1 °F (36.7 °C)  Pulse:  [] 102  Resp:  [15-18] 15  SpO2:  [95 %-100 %] 100 %  BP: (112-139)/(74-97) 139/93     Weight: 110.8 kg (244 lb 4.8 oz)  Body mass index is 43.28 kg/m².    Intake/Output Summary (Last 24 hours) at 1/17/2025 1232  Last data filed at 1/17/2025 1031  Gross per 24 hour   Intake 720 ml   Output 500 ml   Net 220 ml         Physical Exam  Vitals and nursing note reviewed.   Constitutional:       General: She is not in acute distress.  HENT:      Head: Atraumatic.      Right Ear: External ear normal.      Left Ear: External ear normal.      Nose: Nose normal.      Mouth/Throat:      Mouth: Mucous membranes are  moist.   Cardiovascular:      Rate and Rhythm: Normal rate and regular rhythm.   Pulmonary:      Effort: Pulmonary effort is normal. No respiratory distress.      Breath sounds: No wheezing or rales.   Musculoskeletal:         General: Normal range of motion.      Cervical back: Normal range of motion.      Right lower leg: No edema.      Left lower leg: No edema.   Skin:     General: Skin is dry.   Neurological:      General: No focal deficit present.      Mental Status: She is alert and oriented to person, place, and time.      Cranial Nerves: No cranial nerve deficit.      Motor: No weakness.   Psychiatric:         Mood and Affect: Mood normal.         Behavior: Behavior normal.             Significant Labs: All pertinent labs within the past 24 hours have been reviewed.  BMP:   Recent Labs   Lab 01/17/25  0401   *      K 4.0      CO2 25   BUN 17   CREATININE 0.8   CALCIUM 9.8   MG 2.0     CBC:   Recent Labs   Lab 01/15/25  1739 01/16/25  0445 01/17/25  0401   WBC 8.18 9.74 16.19*   HGB 13.1 12.8 13.4   HCT 39.1 39.9 42.0    417 395       Significant Imaging: I have reviewed all pertinent imaging results/findings within the past 24 hours.

## 2025-01-17 NOTE — NURSING
Requested that pt void so that post void residual could be measured. States that she is unable to at present. Reminded pt to call nurse after voiding to measure post void residual. Voices understanding.

## 2025-01-17 NOTE — CONSULTS
[facility]  Neurology  Consult Note    Patient Name: Dalia Collado  MRN: [id number]  Admission Date: 1/15/2025  Hospital Length of Stay: 0 days  Code Status: Full Code   Attending Provider: Aliya Jacobs MD   Consulting Provider: Jono Chun DO  Primary Care Physician: Chelsea, Primary Doctor  Principal Problem:Myelin oligodendrocyte glycoprotein antibody disorder (MOGAD)    Consults   Subjective:     Chief Complaint:  MOGAD     HPI:     34-year-old woman with diagnosis of MOGAD (antibody positive, 98% specificity) presenting with one-week history of dysuria, urgency x1 week, followed by paresthesias involving the posterior aspect of her legs bilaterally.  She was given 1 g IV methylprednisolone in the ER yesterday in the 15th.     MRI of the brain with and without contrast was obtained which does not show any acute lesion or abnormal enhancement (personal review).  We will try to obtain MRI of the thoracic and lumbar spine, as the symptoms she endorses localizes to the conus.  May have to wait given exposure to contrast last night.    Since November of 2024, she has had the onset of neurological symptoms suggestive of demyelinating disease.  Initially presented with an episode of optic neuritis on the left, this was relatively mild and responded well to treatment.  She then had a 2nd episode of optic neuritis involving the right eye, this time more severely impairing her vision on the right eye as well as partially on the left.  After treatment with IV steroids however she did ultimately improve, and feels that at this point her vision is close if not fully back to baseline.    Over the past 7-10 days, she has noted progressive difficulty urinating, painful paresthesias involving the posterior aspects of left more so than right legs, and some intermittent electric shock-like paresthesias involving the right side of her chest.  With respect to her urination, she notes that it is becoming  increasingly more difficult to void; she is able to appreciate feeling full, but often has difficulty initiating her stream, and feels she has only incompletely emptying her bladder.  This will result in her urinating small volumes more frequently.  She denies any painful cramps or spasms.  She has not had any urinary incontinence, but did have 1 episode of bowel incontinence, felt the urge but was unable to hold it.  She does not have any history of kidney stones, no hematuria.    Paresthesias involve a painful, both sharp pins and needles and throbbing pain in the bottoms of her feet, up the posterior aspects of her calves and thighs up into her buttocks.  She has some intermittent, relatively milder residual headache or eye pain, does not appreciate is consistently brought on with eye movement or position.  She may experience some muscle tightness and jerks or spasms of her muscles at times.  She denies any cognitive issues     Past Medical History:   Diagnosis Date    Depression        Past Surgical History:   Procedure Laterality Date    TUBAL LIGATION         Review of patient's allergies indicates:  No Known Allergies    Current Neurological Medications:      No current facility-administered medications on file prior to encounter.     No current outpatient medications on file prior to encounter.     Family History       Problem Relation (Age of Onset)    Arthritis Mother          Tobacco Use    Smoking status: Never    Smokeless tobacco: Never   Substance and Sexual Activity    Alcohol use: Not on file    Drug use: Never    Sexual activity: Not on file     Review of Systems  Objective:     Vital Signs (Most Recent):  Temp: 97.7 °F (36.5 °C) (01/17/25 0806)  Pulse: 82 (01/17/25 0806)  Resp: 16 (01/17/25 0806)  BP: 125/82 (01/17/25 0806)  SpO2: 95 % (01/17/25 0806) Vital Signs (24h Range):  Temp:  [97.7 °F (36.5 °C)-98.4 °F (36.9 °C)] 97.7 °F (36.5 °C)  Pulse:  [] 82  Resp:  [15-18] 16  SpO2:  [95 %-100  %] 95 %  BP: (112-138)/(74-97) 125/82     Weight: 110.8 kg (244 lb 4.8 oz)  Body mass index is 43.28 kg/m².     Physical Exam  Vitals reviewed.   HENT:      Head: Normocephalic and atraumatic.      Mouth/Throat:      Mouth: Mucous membranes are moist.      Pharynx: Oropharynx is clear.   Eyes:      Extraocular Movements: EOM normal.      Pupils: Pupils are equal, round, and reactive to light.   Cardiovascular:      Rate and Rhythm: Normal rate.   Pulmonary:      Effort: Pulmonary effort is normal.   Musculoskeletal:         General: Normal range of motion.      Cervical back: Normal range of motion.   Skin:     General: Skin is warm and dry.   Neurological:      Mental Status: She is alert and oriented to person, place, and time.      Motor: Motor strength is normal.     Coordination: Finger-Nose-Finger Test normal.      Deep Tendon Reflexes:      Reflex Scores:       Tricep reflexes are 3+ on the right side and 4+ on the left side.       Bicep reflexes are 4+ on the right side and 4+ on the left side.       Brachioradialis reflexes are 3+ on the right side and 3+ on the left side.       Patellar reflexes are 4+ on the right side and 4+ on the left side.       Achilles reflexes are 3+ on the right side and 3+ on the left side.  Psychiatric:         Mood and Affect: Mood normal.         Speech: Speech normal.         Behavior: Behavior normal.         Thought Content: Thought content normal.         Judgment: Judgment normal.          NEUROLOGICAL EXAMINATION:     MENTAL STATUS   Oriented to person, place, and time.   Registration: recalls 3 of 3 objects. Recall at 5 minutes: recalls 3 of 3 objects. Follows 3 step commands.   Attention: normal. Concentration: normal.   Speech: speech is normal   Level of consciousness: alert  Knowledge: good.   Able to name object. Able to repeat. Normal comprehension.     CRANIAL NERVES     CN II   Visual fields full to confrontation.     CN III, IV, VI   Pupils are equal, round,  "and reactive to light.  Extraocular motions are normal.   Right pupil: Shape: regular. Consensual response: APD.   Left pupil: Shape: regular. Consensual response: intact.   CN III: no CN III palsy  CN VI: no CN VI palsy    CN V   Facial sensation intact.     CN VII   Facial expression full, symmetric.     CN VIII   CN VIII normal.     CN IX, X   CN IX normal.   CN X normal.   Palate: symmetric    CN XI   CN XI normal.     CN XII   CN XII normal.     MOTOR EXAM   Muscle bulk: normal  Overall muscle tone: increased  Right arm pronator drift: absent  Left arm pronator drift: absent    Strength   Strength 5/5 throughout.     REFLEXES     Reflexes   Right brachioradialis: 3+  Left brachioradialis: 3+  Right biceps: 4+  Left biceps: 4+  Right triceps: 3+  Left triceps: 4+  Right patellar: 4+  Left patellar: 4+  Right achilles: 3+  Left achilles: 3+  Right plantar: normal  Left plantar: equivocal  Right Andersen: present  Left Andersen: present  Right ankle clonus: present  Left ankle clonus: present    SENSORY EXAM   Light touch normal.        Decreased pin sensitivity posterior aspects of her calves, intact over the thighs and dorsum of the feet     GAIT AND COORDINATION      Coordination   Finger to nose coordination: normal    Tremor   Resting tremor: absent  Action tremor: absent      Significant Labs: Hemoglobin A1c: No results for input(s): "HGBA1C" in the last 720 hours.  CBC:   Recent Labs   Lab 01/15/25  1739 01/16/25  0445 01/17/25  0401   WBC 8.18 9.74 16.19*   HGB 13.1 12.8 13.4   HCT 39.1 39.9 42.0    417 395     CMP:   Recent Labs   Lab 01/15/25  1739 01/16/25  0445 01/17/25  0401   * 206* 154*    139 140   K 3.7 3.8 4.0    106 106   CO2 24 24 25   BUN 8 9 17   CREATININE 0.7 0.7 0.8   CALCIUM 8.5* 9.0 9.8   MG 1.8 1.8 2.0   PROT 7.0 7.2 7.3   ALBUMIN 3.9 4.0 4.0   BILITOT 0.4 0.6 0.4   ALKPHOS 149* 148* 131   AST 12 11 9*   ALT 12 12 10   ANIONGAP 8 9 9     Inflammatory Markers: "   Recent Labs   Lab 01/15/25  1739   SEDRATE 20   CRP 0.40     Urine Studies:   Recent Labs   Lab 01/15/25  1655   COLORU Yellow   APPEARANCEUA Clear   PHUR 6.0   SPECGRAV 1.015   PROTEINUA Negative   GLUCUA Negative   KETONESU Negative   BILIRUBINUA Negative   OCCULTUA Negative   NITRITE Negative   UROBILINOGEN Negative   LEUKOCYTESUR 1+*   RBCUA 1   WBCUA 6*   SQUAMEPITHEL 2      Latest Reference Range & Units Most Recent   CNS Demyelinating Disease Eval  SEE BELOW  10/20/24 22:14   MOG-IgG1 Negative  Reactive !  10/20/24 22:14   MOG-IgG1 TITER <1:20 titer Positive 1:100 (H)  10/20/24 21:52   NIF IFA, S Negative  Negative  10/22/24 04:43   CCP Antibodies <5.0 U/mL <0.5  10/23/24 16:54   !: Data is abnormal  (H): Data is abnormally high   Latest Reference Range & Units Most Recent   AMPA-R Ab CBA, Serum Negative  Negative  10/22/24 04:43   CASPR2-IgG CBA Negative  Negative  10/22/24 04:43   CRMP-5 IgG Negative  Negative  10/22/24 04:43   Encephalopathy, Interpretation  SEE BELOW  10/22/24 04:43   BAYRON-B-R Ab CBA, Serum Negative  Negative  10/22/24 04:43   GFAP IFA, Serum Negative  Negative  10/22/24 04:43   LGI1-IgG CBA Negative  Negative  10/22/24 04:43   mGluR1 Ab, IFA, Serum Negative  Negative  10/22/24 04:43   NMDA-R Ab CBA, Serum Negative  Negative  10/22/24 04:43   NMO/AQP4 FACS,S Negative  Negative  10/20/24 22:14   PAVAL AGNA-1, Serum Negative  Negative  10/22/24 04:43   PAVAL CORDELL-1, Serum Negative  Negative  10/22/24 04:43   PAVAL CORDELL-2, Serum Negative  Negative  10/22/24 04:43   PAVAL CORDELL-3, Serum Negative  Negative  10/22/24 04:43   PAVAL,  Amphiphysin Ab, Serum Negative  Negative  10/22/24 04:43   PAVAL, PCA-1, Serum Negative  Negative  10/22/24 04:43   PAVAL, PCA-2, Serum Negative  Negative  10/22/24 04:43   PAVAL, PCA-Tr, Serum Negative  Negative  10/22/24 04:43   Rheumatoid Factor 0.0 - 15.0 IU/mL <13.0  10/23/24 16:54   IgLON5 CBA, SERUM Negative  Negative  10/22/24 04:43   Hepatitis C Ab  Non-reactive  Non-reactive  10/20/24 22:14   Influenza B, Molecular Negative, Invalid  Negative  8/24/23 08:16   Eschericia coli K1 Not Detected  Not Detected  10/22/24 17:30   Haemophilus influenzae Not Detected  Not Detected  10/22/24 17:30   Listeria monocytogenes Not Detected  Not Detected  10/22/24 17:30   Neisseria meningtidis Not Detected  Not Detected  10/22/24 17:30   Streptococcus agalactiae Not Detected  Not Detected  10/22/24 17:30   Streptococcus pneumoniae Not Detected  Not Detected  10/22/24 17:30   Cytomegalovirus Not Detected  Not Detected  10/22/24 17:30   Enterovirus Not Detected  Not Detected  10/22/24 17:30   Herpes Simplex Virus 1 Not Detected  Not Detected  10/22/24 17:30   Herpes Simplex Virus 2 Not Detected  Not Detected  10/22/24 17:30   Human Parechovirus Not Detected  Not Detected  10/22/24 17:30   Varicella Zoster Virus Not Detected  Not Detected  10/22/24 17:30   Cryptococcus neoformans/gattii Not Detected  Not Detected  10/22/24 17:30   Human Herpes Virus 6 Not Detected  Not Detected  10/22/24 17:30   SARS COV-2 MOLECULAR Negative, Invalid  Negative  8/24/23 08:16   HIV 1/2 Ag/Ab Non-reactive  Non-reactive  10/20/24 22:14       Significant Imaging: I have reviewed and interpreted all pertinent imaging results/findings within the past 24 hours.    MRI brain 01/15/2025:  FINDINGS:  No abnormal diffusion restriction to suggest an acute infarct, and no abnormal GRE signal intensity to suggest an intracranial bleed.  There is no hydrocephalus, herniation or midline shift, and the basal/suprasellar cisterns are patent.  No acute osseous abnormality is identified. There is no cerebral/cerebellar atrophy for age.     The brain has normal ventricles with symmetrical sulci, unremarkable internal structure, and near normal signal intensity throughout.  No focal abnormal contrast enhancement is seen.  The T2 FLAIR hyperintense foci seen in the right mesial temporal lobe on the MRI from 10/20/2024,  which resolved on the MRI of 11/19/2024 are not seen on this exam.  No new additional sites of T2 FLAIR signal abnormality.     The pituitary gland and infundibulum is normal in size without abnormal signal pattern. The craniocervical junction is unremarkable. The temporal bones, internal and external meati, and semicircular canals appear normal. The orbital contents are normal.  There is fluid partially opacifying the right-sided mastoid air cells.  The left-sided mastoid air cells and paranasal sinuses are essentially clear.     Impression:     1.  No acute intracranial process.     2.  Fluid in the right-sided mastoid air cells, consider correlation for mastoiditis.        MRI thoracic spine 01/16/2025:  Impression:  Lower cervical cord too much of the thoracic cord, with mild enhancement consistent with an active lesion.         Detrimental interval development of diffuse cord signal alteration involving the majority of the visualized portion of the lower cervical and thoracic spinal cord associated with mild postcontrast enhancement.  The findings are consistent with active demyelinating process/transverse myelitis.     Bilateral L5 spondylolysis and associated spondylolisthesis at L5-S1.  See above details.     MRI lumbar spine 01/16/2025:  Impression:     Detrimental interval development of diffuse cord signal alteration involving the majority of the visualized portion of the lower cervical and thoracic spinal cord associated with mild postcontrast enhancement.  The findings are consistent with active demyelinating process/transverse myelitis.     Bilateral L5 spondylolysis and associated spondylolisthesis at L5-S1.  See above details.       Assessment and Plan:     * Myelin oligodendrocyte glycoprotein antibody disorder (MOGAD)  Recent diagnosis MOGAD, presents with subacute onset of urinary dysfunction, lower extremity painful paresthesias in a sacral distribution, diffuse hyper Niya and hyperreflexia.   Imaging studies demonstrate longitudinally extensive spinal lesion extending from the lower cervical through much of the thoracic spinal cord.  Mild residual enhancement consistent with an active lesion.    Plan to complete 5 days IV Solu-Medrol, 1000 mg daily (today day 3)  -monitor sugars for hyperglycemia  -for symptomatic treatment, will start gabapentin 300 mg b.i.d., titrate as tolerated to effective dose.  Discussed potential side effects including drowsiness.  -baclofen 5 mg 3 times daily, titrate as needed for muscle spasms and hypertonicity  - physical therapy assessment, relief of spasticity   - her urinary issues are really most consistent with an acute urinary retention; she has to strain in order to void, with frequent, relatively low volume urinations.  Given the complexity of managing urinary retention acutely in a woman will request urology consult for assistance        VTE Risk Mitigation (From admission, onward)           Ordered     enoxaparin injection 40 mg  Daily         01/15/25 1852     IP VTE HIGH RISK PATIENT  Once         01/15/25 1852     Place sequential compression device  Until discontinued         01/15/25 1852                    Thank you for your consult. I will follow-up with patient. Please contact us if you have any additional questions.    Jono Chun, DO  Neurology  [facility]

## 2025-01-17 NOTE — SUBJECTIVE & OBJECTIVE
Past Medical History:   Diagnosis Date    Depression        Past Surgical History:   Procedure Laterality Date    TUBAL LIGATION         Review of patient's allergies indicates:  No Known Allergies    Current Neurological Medications:      No current facility-administered medications on file prior to encounter.     No current outpatient medications on file prior to encounter.     Family History       Problem Relation (Age of Onset)    Arthritis Mother          Tobacco Use    Smoking status: Never    Smokeless tobacco: Never   Substance and Sexual Activity    Alcohol use: Not on file    Drug use: Never    Sexual activity: Not on file     Review of Systems  Objective:     Vital Signs (Most Recent):  Temp: 97.7 °F (36.5 °C) (01/17/25 0806)  Pulse: 82 (01/17/25 0806)  Resp: 16 (01/17/25 0806)  BP: 125/82 (01/17/25 0806)  SpO2: 95 % (01/17/25 0806) Vital Signs (24h Range):  Temp:  [97.7 °F (36.5 °C)-98.4 °F (36.9 °C)] 97.7 °F (36.5 °C)  Pulse:  [] 82  Resp:  [15-18] 16  SpO2:  [95 %-100 %] 95 %  BP: (112-138)/(74-97) 125/82     Weight: 110.8 kg (244 lb 4.8 oz)  Body mass index is 43.28 kg/m².     Physical Exam  Vitals reviewed.   HENT:      Head: Normocephalic and atraumatic.      Mouth/Throat:      Mouth: Mucous membranes are moist.      Pharynx: Oropharynx is clear.   Eyes:      Extraocular Movements: EOM normal.      Pupils: Pupils are equal, round, and reactive to light.   Cardiovascular:      Rate and Rhythm: Normal rate.   Pulmonary:      Effort: Pulmonary effort is normal.   Musculoskeletal:         General: Normal range of motion.      Cervical back: Normal range of motion.   Skin:     General: Skin is warm and dry.   Neurological:      Mental Status: She is alert and oriented to person, place, and time.      Motor: Motor strength is normal.     Coordination: Finger-Nose-Finger Test normal.      Deep Tendon Reflexes:      Reflex Scores:       Tricep reflexes are 3+ on the right side and 4+ on the left  side.       Bicep reflexes are 4+ on the right side and 4+ on the left side.       Brachioradialis reflexes are 3+ on the right side and 3+ on the left side.       Patellar reflexes are 4+ on the right side and 4+ on the left side.       Achilles reflexes are 3+ on the right side and 3+ on the left side.  Psychiatric:         Mood and Affect: Mood normal.         Speech: Speech normal.         Behavior: Behavior normal.         Thought Content: Thought content normal.         Judgment: Judgment normal.          NEUROLOGICAL EXAMINATION:     MENTAL STATUS   Oriented to person, place, and time.   Registration: recalls 3 of 3 objects. Recall at 5 minutes: recalls 3 of 3 objects. Follows 3 step commands.   Attention: normal. Concentration: normal.   Speech: speech is normal   Level of consciousness: alert  Knowledge: good.   Able to name object. Able to repeat. Normal comprehension.     CRANIAL NERVES     CN II   Visual fields full to confrontation.     CN III, IV, VI   Pupils are equal, round, and reactive to light.  Extraocular motions are normal.   Right pupil: Shape: regular. Consensual response: APD.   Left pupil: Shape: regular. Consensual response: intact.   CN III: no CN III palsy  CN VI: no CN VI palsy    CN V   Facial sensation intact.     CN VII   Facial expression full, symmetric.     CN VIII   CN VIII normal.     CN IX, X   CN IX normal.   CN X normal.   Palate: symmetric    CN XI   CN XI normal.     CN XII   CN XII normal.     MOTOR EXAM   Muscle bulk: normal  Overall muscle tone: increased  Right arm pronator drift: absent  Left arm pronator drift: absent    Strength   Strength 5/5 throughout.     REFLEXES     Reflexes   Right brachioradialis: 3+  Left brachioradialis: 3+  Right biceps: 4+  Left biceps: 4+  Right triceps: 3+  Left triceps: 4+  Right patellar: 4+  Left patellar: 4+  Right achilles: 3+  Left achilles: 3+  Right plantar: normal  Left plantar: equivocal  Right Andersen: present  Left Andersen:  "present  Right ankle clonus: present  Left ankle clonus: present    SENSORY EXAM   Light touch normal.        Decreased pin sensitivity posterior aspects of her calves, intact over the thighs and dorsum of the feet     GAIT AND COORDINATION      Coordination   Finger to nose coordination: normal    Tremor   Resting tremor: absent  Action tremor: absent      Significant Labs: Hemoglobin A1c: No results for input(s): "HGBA1C" in the last 720 hours.  CBC:   Recent Labs   Lab 01/15/25  1739 01/16/25  0445 01/17/25  0401   WBC 8.18 9.74 16.19*   HGB 13.1 12.8 13.4   HCT 39.1 39.9 42.0    417 395     CMP:   Recent Labs   Lab 01/15/25  1739 01/16/25  0445 01/17/25  0401   * 206* 154*    139 140   K 3.7 3.8 4.0    106 106   CO2 24 24 25   BUN 8 9 17   CREATININE 0.7 0.7 0.8   CALCIUM 8.5* 9.0 9.8   MG 1.8 1.8 2.0   PROT 7.0 7.2 7.3   ALBUMIN 3.9 4.0 4.0   BILITOT 0.4 0.6 0.4   ALKPHOS 149* 148* 131   AST 12 11 9*   ALT 12 12 10   ANIONGAP 8 9 9     Inflammatory Markers:   Recent Labs   Lab 01/15/25  1739   SEDRATE 20   CRP 0.40     Urine Studies:   Recent Labs   Lab 01/15/25  1655   COLORU Yellow   APPEARANCEUA Clear   PHUR 6.0   SPECGRAV 1.015   PROTEINUA Negative   GLUCUA Negative   KETONESU Negative   BILIRUBINUA Negative   OCCULTUA Negative   NITRITE Negative   UROBILINOGEN Negative   LEUKOCYTESUR 1+*   RBCUA 1   WBCUA 6*   SQUAMEPITHEL 2      Latest Reference Range & Units Most Recent   CNS Demyelinating Disease Eval  SEE BELOW  10/20/24 22:14   MOG-IgG1 Negative  Reactive !  10/20/24 22:14   MOG-IgG1 TITER <1:20 titer Positive 1:100 (H)  10/20/24 21:52   NIF IFA, S Negative  Negative  10/22/24 04:43   CCP Antibodies <5.0 U/mL <0.5  10/23/24 16:54   !: Data is abnormal  (H): Data is abnormally high   Latest Reference Range & Units Most Recent   AMPA-R Ab CBA, Serum Negative  Negative  10/22/24 04:43   CASPR2-IgG CBA Negative  Negative  10/22/24 04:43   CRMP-5 IgG Negative  Negative  10/22/24 " 04:43   Encephalopathy, Interpretation  SEE BELOW  10/22/24 04:43   BAYRON-B-R Ab CBA, Serum Negative  Negative  10/22/24 04:43   GFAP IFA, Serum Negative  Negative  10/22/24 04:43   LGI1-IgG CBA Negative  Negative  10/22/24 04:43   mGluR1 Ab, IFA, Serum Negative  Negative  10/22/24 04:43   NMDA-R Ab CBA, Serum Negative  Negative  10/22/24 04:43   NMO/AQP4 FACS,S Negative  Negative  10/20/24 22:14   PAVAL AGNA-1, Serum Negative  Negative  10/22/24 04:43   PAVAL CODRELL-1, Serum Negative  Negative  10/22/24 04:43   PAVAL CORDELL-2, Serum Negative  Negative  10/22/24 04:43   PAVAL CORDELL-3, Serum Negative  Negative  10/22/24 04:43   PAVAL,  Amphiphysin Ab, Serum Negative  Negative  10/22/24 04:43   PAVAL, PCA-1, Serum Negative  Negative  10/22/24 04:43   PAVAL, PCA-2, Serum Negative  Negative  10/22/24 04:43   PAVAL, PCA-Tr, Serum Negative  Negative  10/22/24 04:43   Rheumatoid Factor 0.0 - 15.0 IU/mL <13.0  10/23/24 16:54   IgLON5 CBA, SERUM Negative  Negative  10/22/24 04:43   Hepatitis C Ab Non-reactive  Non-reactive  10/20/24 22:14   Influenza B, Molecular Negative, Invalid  Negative  8/24/23 08:16   Eschericia coli K1 Not Detected  Not Detected  10/22/24 17:30   Haemophilus influenzae Not Detected  Not Detected  10/22/24 17:30   Listeria monocytogenes Not Detected  Not Detected  10/22/24 17:30   Neisseria meningtidis Not Detected  Not Detected  10/22/24 17:30   Streptococcus agalactiae Not Detected  Not Detected  10/22/24 17:30   Streptococcus pneumoniae Not Detected  Not Detected  10/22/24 17:30   Cytomegalovirus Not Detected  Not Detected  10/22/24 17:30   Enterovirus Not Detected  Not Detected  10/22/24 17:30   Herpes Simplex Virus 1 Not Detected  Not Detected  10/22/24 17:30   Herpes Simplex Virus 2 Not Detected  Not Detected  10/22/24 17:30   Human Parechovirus Not Detected  Not Detected  10/22/24 17:30   Varicella Zoster Virus Not Detected  Not Detected  10/22/24 17:30   Cryptococcus neoformans/gattii Not Detected   Not Detected  10/22/24 17:30   Human Herpes Virus 6 Not Detected  Not Detected  10/22/24 17:30   SARS COV-2 MOLECULAR Negative, Invalid  Negative  8/24/23 08:16   HIV 1/2 Ag/Ab Non-reactive  Non-reactive  10/20/24 22:14       Significant Imaging: I have reviewed and interpreted all pertinent imaging results/findings within the past 24 hours.    MRI brain 01/15/2025:  FINDINGS:  No abnormal diffusion restriction to suggest an acute infarct, and no abnormal GRE signal intensity to suggest an intracranial bleed.  There is no hydrocephalus, herniation or midline shift, and the basal/suprasellar cisterns are patent.  No acute osseous abnormality is identified. There is no cerebral/cerebellar atrophy for age.     The brain has normal ventricles with symmetrical sulci, unremarkable internal structure, and near normal signal intensity throughout.  No focal abnormal contrast enhancement is seen.  The T2 FLAIR hyperintense foci seen in the right mesial temporal lobe on the MRI from 10/20/2024, which resolved on the MRI of 11/19/2024 are not seen on this exam.  No new additional sites of T2 FLAIR signal abnormality.     The pituitary gland and infundibulum is normal in size without abnormal signal pattern. The craniocervical junction is unremarkable. The temporal bones, internal and external meati, and semicircular canals appear normal. The orbital contents are normal.  There is fluid partially opacifying the right-sided mastoid air cells.  The left-sided mastoid air cells and paranasal sinuses are essentially clear.     Impression:     1.  No acute intracranial process.     2.  Fluid in the right-sided mastoid air cells, consider correlation for mastoiditis.        MRI thoracic spine 01/16/2025:  Impression:  Lower cervical cord too much of the thoracic cord, with mild enhancement consistent with an active lesion.         Detrimental interval development of diffuse cord signal alteration involving the majority of the  visualized portion of the lower cervical and thoracic spinal cord associated with mild postcontrast enhancement.  The findings are consistent with active demyelinating process/transverse myelitis.     Bilateral L5 spondylolysis and associated spondylolisthesis at L5-S1.  See above details.     MRI lumbar spine 01/16/2025:  Impression:     Detrimental interval development of diffuse cord signal alteration involving the majority of the visualized portion of the lower cervical and thoracic spinal cord associated with mild postcontrast enhancement.  The findings are consistent with active demyelinating process/transverse myelitis.     Bilateral L5 spondylolysis and associated spondylolisthesis at L5-S1.  See above details.

## 2025-01-17 NOTE — PROGRESS NOTES
[facility]  Progress Note    Patient Name: Dalia Collado  MRN: [id number]  Patient Class: IP- Inpatient   Admission Date: 1/15/2025  Length of Stay: 0 days  Attending Physician: Aliya Jacobs MD  Primary Care Provider: Chelsea, Primary Doctor        Subjective     Principal Problem:Myelin oligodendrocyte glycoprotein antibody disorder (MOGAD)        HPI:  34 year old pt getting admitted with possible complications from MOGAD  Pt was diagnosed with bilateral optic neuritis and subsequently MOGAD on Nov 2024  Pt was symptoms free since then until 1 week ago    Started having difficulty in urination and urge to urinate again and again 1 week ago  This was followed by numbness/tingling/cold sensation on legs  Pt tried to contact her Neurologist and seems like Neuro MD left the practice and moved out of state  She came to ER and got admitted         Overview/Hospital Course:  This is a 34-year-old lady previous diagnosis of myelin oligodendrocyte glycoprotein antibody disorder with recent presentation optic neuritis given pulse steroids in November.  She did well for several weeks until 1 week ago when she is mainly complaining of urinary retention, spasticity and numbness and tingling of her bilateral legs and feet.  She is admitted for the same.  Neurologist is consulted inpatient is again initiated on pulse dose steroids.    Interval History:  Patient is seen and examined this morning.  She is resting comfortably in no acute distress.  She reports he only symptoms remaining are bilateral feet numbness.  She does wishes to stay in the hospital to complete her 5 day course.    Review of Systems   Constitutional:  Negative for activity change and appetite change.   HENT:  Negative for congestion and dental problem.    Eyes:  Negative for discharge and itching.   Respiratory:  Negative for shortness of breath.    Cardiovascular:  Negative for chest pain.   Gastrointestinal:  Negative  for abdominal distention and abdominal pain.   Endocrine: Negative for cold intolerance.   Genitourinary:  Negative for difficulty urinating and dysuria.   Musculoskeletal:  Negative for arthralgias and back pain.   Skin:  Negative for color change.   Neurological:  Positive for weakness and numbness. Negative for dizziness and facial asymmetry.   Hematological:  Negative for adenopathy.   Psychiatric/Behavioral:  Negative for agitation and behavioral problems.      Objective:     Vital Signs (Most Recent):  Temp: 98.1 °F (36.7 °C) (01/17/25 1123)  Pulse: 102 (01/17/25 1123)  Resp: 15 (01/17/25 1123)  BP: (!) 139/93 (01/17/25 1123)  SpO2: 100 % (01/17/25 1123) Vital Signs (24h Range):  Temp:  [97.7 °F (36.5 °C)-98.4 °F (36.9 °C)] 98.1 °F (36.7 °C)  Pulse:  [] 102  Resp:  [15-18] 15  SpO2:  [95 %-100 %] 100 %  BP: (112-139)/(74-97) 139/93     Weight: 110.8 kg (244 lb 4.8 oz)  Body mass index is 43.28 kg/m².    Intake/Output Summary (Last 24 hours) at 1/17/2025 1232  Last data filed at 1/17/2025 1031  Gross per 24 hour   Intake 720 ml   Output 500 ml   Net 220 ml         Physical Exam  Vitals and nursing note reviewed.   Constitutional:       General: She is not in acute distress.  HENT:      Head: Atraumatic.      Right Ear: External ear normal.      Left Ear: External ear normal.      Nose: Nose normal.      Mouth/Throat:      Mouth: Mucous membranes are moist.   Cardiovascular:      Rate and Rhythm: Normal rate and regular rhythm.   Pulmonary:      Effort: Pulmonary effort is normal. No respiratory distress.      Breath sounds: No wheezing or rales.   Musculoskeletal:         General: Normal range of motion.      Cervical back: Normal range of motion.      Right lower leg: No edema.      Left lower leg: No edema.   Skin:     General: Skin is dry.   Neurological:      General: No focal deficit present.      Mental Status: She is alert and oriented to person, place, and time.      Cranial Nerves: No cranial  nerve deficit.      Motor: No weakness.   Psychiatric:         Mood and Affect: Mood normal.         Behavior: Behavior normal.             Significant Labs: All pertinent labs within the past 24 hours have been reviewed.  BMP:   Recent Labs   Lab 01/17/25  0401   *      K 4.0      CO2 25   BUN 17   CREATININE 0.8   CALCIUM 9.8   MG 2.0     CBC:   Recent Labs   Lab 01/15/25  1739 01/16/25  0445 01/17/25  0401   WBC 8.18 9.74 16.19*   HGB 13.1 12.8 13.4   HCT 39.1 39.9 42.0    417 395       Significant Imaging: I have reviewed all pertinent imaging results/findings within the past 24 hours.    Assessment and Plan     * Myelin oligodendrocyte glycoprotein antibody disorder (MOGAD)  Getting admitted with possible complications from MOGAD  MRI spine showing detrimental interval development of diffuse cord signal alteration involving the majority of the visualized portion of the lower cervical and thoracic spinal cord associated with mild postcontrast enhancement.  The findings are consistent with active demyelinating process/transverse myelitis.   Neurology following continuing high dose steroids, 5 day course  Did well with physical therapy  Full strength throughout      Morbid obesity  Body mass index is 38.97 kg/m². Morbid obesity complicates all aspects of disease management from diagnostic modalities to treatment.     Optic neuritis  H/o aware         VTE Risk Mitigation (From admission, onward)           Ordered     enoxaparin injection 40 mg  Daily         01/15/25 1852     IP VTE HIGH RISK PATIENT  Once         01/15/25 1852     Place sequential compression device  Until discontinued         01/15/25 1852                    Discharge Planning   JORDEN: 1/19/2025     Code Status: Full Code   Medical Readiness for Discharge Date:   Discharge Plan A: Home with family   Discharge Delays: (!) Procedure Scheduling (IR, OR, Labs, Echo, Cath, Echo, EEG)                    Aliya Jacobs,  MD  Department of Hospital Medicine   [facility]

## 2025-01-17 NOTE — ASSESSMENT & PLAN NOTE
Recent diagnosis MOGAD, presents with subacute onset of urinary dysfunction, lower extremity painful paresthesias in a sacral distribution, diffuse hyper Niya and hyperreflexia.  Imaging studies demonstrate longitudinally extensive spinal lesion extending from the lower cervical through much of the thoracic spinal cord.  Mild residual enhancement consistent with an active lesion.    Plan to complete 5 days IV Solu-Medrol, 1000 mg daily (today day 3)  -monitor sugars for hyperglycemia  -for symptomatic treatment, will start gabapentin 300 mg b.i.d., titrate as tolerated to effective dose.  Discussed potential side effects including drowsiness.  -baclofen 5 mg 3 times daily, titrate as needed for muscle spasms and hypertonicity  - physical therapy assessment, relief of spasticity   - her urinary issues are really most consistent with an acute urinary retention; she has to strain in order to void, with frequent, relatively low volume urinations.  Given the complexity of managing urinary retention acutely in a woman will request urology consult for assistance

## 2025-01-18 LAB
25(OH)D3+25(OH)D2 SERPL-MCNC: 14 NG/ML (ref 30–96)
ALBUMIN SERPL BCP-MCNC: 3.7 G/DL (ref 3.5–5.2)
ALP SERPL-CCNC: 116 U/L (ref 55–135)
ALT SERPL W/O P-5'-P-CCNC: 9 U/L (ref 10–44)
ANION GAP SERPL CALC-SCNC: 8 MMOL/L (ref 8–16)
AST SERPL-CCNC: 9 U/L (ref 10–40)
BASOPHILS # BLD AUTO: 0.02 K/UL (ref 0–0.2)
BASOPHILS NFR BLD: 0.1 % (ref 0–1.9)
BILIRUB SERPL-MCNC: 0.4 MG/DL (ref 0.1–1)
BUN SERPL-MCNC: 17 MG/DL (ref 6–20)
CALCIUM SERPL-MCNC: 9.1 MG/DL (ref 8.7–10.5)
CHLORIDE SERPL-SCNC: 106 MMOL/L (ref 95–110)
CO2 SERPL-SCNC: 26 MMOL/L (ref 23–29)
CREAT SERPL-MCNC: 0.7 MG/DL (ref 0.5–1.4)
DIFFERENTIAL METHOD BLD: ABNORMAL
EOSINOPHIL # BLD AUTO: 0 K/UL (ref 0–0.5)
EOSINOPHIL NFR BLD: 0.1 % (ref 0–8)
ERYTHROCYTE [DISTWIDTH] IN BLOOD BY AUTOMATED COUNT: 13.1 % (ref 11.5–14.5)
EST. GFR  (NO RACE VARIABLE): >60 ML/MIN/1.73 M^2
GLUCOSE SERPL-MCNC: 150 MG/DL (ref 70–110)
HCT VFR BLD AUTO: 39.3 % (ref 37–48.5)
HGB BLD-MCNC: 12.6 G/DL (ref 12–16)
IMM GRANULOCYTES # BLD AUTO: 0.12 K/UL (ref 0–0.04)
IMM GRANULOCYTES NFR BLD AUTO: 0.6 % (ref 0–0.5)
LYMPHOCYTES # BLD AUTO: 1.1 K/UL (ref 1–4.8)
LYMPHOCYTES NFR BLD: 5.7 % (ref 18–48)
MAGNESIUM SERPL-MCNC: 2 MG/DL (ref 1.6–2.6)
MCH RBC QN AUTO: 29.1 PG (ref 27–31)
MCHC RBC AUTO-ENTMCNC: 32.1 G/DL (ref 32–36)
MCV RBC AUTO: 91 FL (ref 82–98)
MONOCYTES # BLD AUTO: 0.8 K/UL (ref 0.3–1)
MONOCYTES NFR BLD: 4.4 % (ref 4–15)
NEUTROPHILS # BLD AUTO: 17 K/UL (ref 1.8–7.7)
NEUTROPHILS NFR BLD: 89.1 % (ref 38–73)
NRBC BLD-RTO: 0 /100 WBC
PLATELET # BLD AUTO: 386 K/UL (ref 150–450)
PMV BLD AUTO: 10 FL (ref 9.2–12.9)
POCT GLUCOSE: 124 MG/DL (ref 70–110)
POCT GLUCOSE: 143 MG/DL (ref 70–110)
POCT GLUCOSE: 151 MG/DL (ref 70–110)
POTASSIUM SERPL-SCNC: 3.6 MMOL/L (ref 3.5–5.1)
PROT SERPL-MCNC: 6.6 G/DL (ref 6–8.4)
RBC # BLD AUTO: 4.33 M/UL (ref 4–5.4)
SODIUM SERPL-SCNC: 140 MMOL/L (ref 136–145)
WBC # BLD AUTO: 19.11 K/UL (ref 3.9–12.7)

## 2025-01-18 PROCEDURE — 25000003 PHARM REV CODE 250: Performed by: NURSE PRACTITIONER

## 2025-01-18 PROCEDURE — 63600175 PHARM REV CODE 636 W HCPCS: Performed by: INTERNAL MEDICINE

## 2025-01-18 PROCEDURE — 80053 COMPREHEN METABOLIC PANEL: CPT | Performed by: INTERNAL MEDICINE

## 2025-01-18 PROCEDURE — 12000002 HC ACUTE/MED SURGE SEMI-PRIVATE ROOM

## 2025-01-18 PROCEDURE — 85025 COMPLETE CBC W/AUTO DIFF WBC: CPT | Performed by: INTERNAL MEDICINE

## 2025-01-18 PROCEDURE — 83735 ASSAY OF MAGNESIUM: CPT | Performed by: INTERNAL MEDICINE

## 2025-01-18 PROCEDURE — 25000003 PHARM REV CODE 250: Performed by: PSYCHIATRY & NEUROLOGY

## 2025-01-18 PROCEDURE — 25000003 PHARM REV CODE 250: Performed by: INTERNAL MEDICINE

## 2025-01-18 PROCEDURE — 82306 VITAMIN D 25 HYDROXY: CPT | Performed by: INTERNAL MEDICINE

## 2025-01-18 PROCEDURE — 36415 COLL VENOUS BLD VENIPUNCTURE: CPT | Performed by: INTERNAL MEDICINE

## 2025-01-18 PROCEDURE — 63600175 PHARM REV CODE 636 W HCPCS: Mod: JZ,TB | Performed by: PSYCHIATRY & NEUROLOGY

## 2025-01-18 RX ORDER — ASPIRIN 325 MG
50000 TABLET, DELAYED RELEASE (ENTERIC COATED) ORAL WEEKLY
Status: DISCONTINUED | OUTPATIENT
Start: 2025-01-18 | End: 2025-01-19 | Stop reason: HOSPADM

## 2025-01-18 RX ADMIN — FAMOTIDINE 20 MG: 20 TABLET, FILM COATED ORAL at 08:01

## 2025-01-18 RX ADMIN — SODIUM CHLORIDE 1000 MG: 9 INJECTION, SOLUTION INTRAVENOUS at 11:01

## 2025-01-18 RX ADMIN — GABAPENTIN 300 MG: 300 CAPSULE ORAL at 09:01

## 2025-01-18 RX ADMIN — GABAPENTIN 300 MG: 300 CAPSULE ORAL at 02:01

## 2025-01-18 RX ADMIN — POTASSIUM BICARBONATE 50 MEQ: 978 TABLET, EFFERVESCENT ORAL at 03:01

## 2025-01-18 RX ADMIN — GABAPENTIN 300 MG: 300 CAPSULE ORAL at 08:01

## 2025-01-18 RX ADMIN — Medication 50000 UNITS: at 03:01

## 2025-01-18 RX ADMIN — FAMOTIDINE 20 MG: 20 TABLET, FILM COATED ORAL at 09:01

## 2025-01-18 RX ADMIN — ENOXAPARIN SODIUM 40 MG: 40 INJECTION SUBCUTANEOUS at 03:01

## 2025-01-18 NOTE — NURSING
Attempted to call in consult for Dr Schaefer, He stated that he has been up for 24 hours and now was not a good time, secure chat requested.     1150 Urology consult has been discontinued.

## 2025-01-18 NOTE — PROGRESS NOTES
[facility]  Neurology  Progress Note    Patient Name: Dalia Collado  MRN: [id number]  Admission Date: 1/15/2025  Hospital Length of Stay: 1 days  Code Status: Full Code   Attending Provider: Aliya Jacobs MD  Primary Care Physician: Chelsea, Primary Doctor   Principal Problem:Myelin oligodendrocyte glycoprotein antibody disorder (MOGAD)    Subjective:   34-year-old lady previous diagnosis of myelin oligodendrocyte glycoprotein antibody disorder with recent presentation optic neuritis given pulse steroids in November. She did well for several weeks until 1 week ago when she is mainly complaining of urinary retention, spasticity and numbness and tingling of her bilateral legs and feet followed by paresthesias involving the posterior aspect of her legs bilaterally.  She was given 1 g IV methylprednisolone in the ER yesterday in the 15th.     MRI of the brain with and without contrast was obtained which does not show any acute lesion or abnormal enhancement (personal review).  We will try to obtain MRI of the thoracic and lumbar spine, as the symptoms she endorses localizes to the conus.  May have to wait given exposure to contrast last night.    Interval History: Patient is seen and examined with Dr. Becerra via Telemedicine. NAD. Reports overall improvement of 90% with IV Solu-Medrol regimen.   Urinary retention spasticity improved numbness and tingling improving now reports only cold feeling and numbness to left foot.    Current Neurological Medications:     Current Facility-Administered Medications   Medication Dose Route Frequency Provider Last Rate Last Admin    acetaminophen tablet 650 mg  650 mg Oral Q8H PRN Tarik Rodrigez MD        acetaminophen tablet 650 mg  650 mg Oral Q4H PRN Tarik Rodrigez MD        aluminum-magnesium hydroxide-simethicone 200-200-20 mg/5 mL suspension 30 mL  30 mL Oral QID PRN Tarik Rodrigez MD        baclofen tablet 5 mg  5 mg Oral TID PRN Jono Chun,          cholecalciferol (vitamin D3) 1,250 mcg (50,000 unit) capsule 50,000 Units  50,000 Units Oral Weekly Tanya Remy NP   50,000 Units at 01/18/25 1555    enoxaparin injection 40 mg  40 mg Subcutaneous Daily Traik Rodrigez MD   40 mg at 01/18/25 1555    famotidine tablet 20 mg  20 mg Oral BID Tarik Rodrigez MD   20 mg at 01/18/25 0906    gabapentin capsule 300 mg  300 mg Oral TID Jono Chun DO   300 mg at 01/18/25 1439    HYDROcodone-acetaminophen 5-325 mg per tablet 1 tablet  1 tablet Oral Q6H PRTarik Garcia MD   1 tablet at 01/16/25 1936    magnesium oxide tablet 800 mg  800 mg Oral PRTarik Garcia MD        magnesium oxide tablet 800 mg  800 mg Oral PRN Tarik Rodrigez MD        methylPREDNISolone (SOLU-MEDROL) 1,000 mg in 0.9% NaCl 100 mL IVPB  1,000 mg Intravenous Daily Jono Chun DO   Stopped at 01/18/25 1157    naloxone 0.4 mg/mL injection 0.02 mg  0.02 mg Intravenous PRN Tarik Rodrigez MD        ondansetron injection 4 mg  4 mg Intravenous Q6H PRN Tarik Rodrigez MD        potassium bicarbonate disintegrating tablet 35 mEq  35 mEq Oral Tarik Alston MD        potassium bicarbonate disintegrating tablet 50 mEq  50 mEq Oral PRTarik Garcia MD   50 mEq at 01/18/25 1543    potassium bicarbonate disintegrating tablet 60 mEq  60 mEq Oral PRTarik Garcia MD        potassium, sodium phosphates 280-160-250 mg packet 2 packet  2 packet Oral PRTarik Garcia MD        potassium, sodium phosphates 280-160-250 mg packet 2 packet  2 packet Oral PRTarik Garcia MD        potassium, sodium phosphates 280-160-250 mg packet 2 packet  2 packet Oral PRTarik Garcia MD        zolpidem tablet 5 mg  5 mg Oral Nightly PRN Jono Chun DO   5 mg at 01/17/25 2103       Review of Systems   Constitutional: Negative.    HENT: Negative.     Respiratory: Negative.     Cardiovascular: Negative.    Gastrointestinal: Negative.    Genitourinary: Negative.    Musculoskeletal: Negative.    Skin: Negative.    Neurological:  Positive  for weakness and numbness.        Numbness left foot.    Psychiatric/Behavioral: Negative.       Objective:     Vital Signs (Most Recent):  Temp: 97.4 °F (36.3 °C) (01/18/25 1532)  Pulse: 78 (01/18/25 1532)  Resp: 14 (01/18/25 1532)  BP: 124/79 (01/18/25 1532)  SpO2: 96 % (01/18/25 1532) Vital Signs (24h Range):  Temp:  [97.4 °F (36.3 °C)-98.2 °F (36.8 °C)] 97.4 °F (36.3 °C)  Pulse:  [] 78  Resp:  [14-18] 14  SpO2:  [96 %-100 %] 96 %  BP: (120-146)/(77-99) 124/79     Weight: 110.8 kg (244 lb 4.8 oz)  Body mass index is 43.28 kg/m².    Physical Exam  Vitals reviewed.   Constitutional:       General: She is not in acute distress.     Appearance: Normal appearance. She is not ill-appearing.   HENT:      Head: Normocephalic and atraumatic.      Mouth/Throat:      Mouth: Mucous membranes are moist.   Eyes:      Extraocular Movements: Extraocular movements intact.      Pupils: Pupils are equal, round, and reactive to light.   Cardiovascular:      Rate and Rhythm: Normal rate and regular rhythm.      Pulses: Normal pulses.   Pulmonary:      Effort: Pulmonary effort is normal. No respiratory distress.      Breath sounds: Normal breath sounds.   Abdominal:      Palpations: Abdomen is soft.   Musculoskeletal:         General: Normal range of motion.      Cervical back: Normal range of motion and neck supple.   Skin:     General: Skin is warm and dry.   Neurological:      Mental Status: She is alert and oriented to person, place, and time.      GCS: GCS eye subscore is 4. GCS verbal subscore is 5. GCS motor subscore is 6.      Cranial Nerves: Cranial nerves 2-12 are intact.      Sensory: Sensory deficit present.      Motor: Motor function is intact. No tremor.      Coordination: Coordination is intact.      Gait: Gait is intact.      Deep Tendon Reflexes:      Reflex Scores:       Tricep reflexes are 3+ on the right side and 3+ on the left side.       Bicep reflexes are 3+ on the right side and 3+ on the left side.        Brachioradialis reflexes are 3+ on the right side and 3+ on the left side.       Patellar reflexes are 3+ on the right side and 3+ on the left side.       Achilles reflexes are 3+ on the right side and 3+ on the left side.     Comments: Clonus left ankle present  Decreased pin sensitivity to left foot    Psychiatric:         Mood and Affect: Mood normal.         NEUROLOGICAL EXAMINATION:     MENTAL STATUS   Oriented to person, place, and time.     CRANIAL NERVES   Cranial nerves II through XII intact.     CN III, IV, VI   Pupils are equal, round, and reactive to light.    REFLEXES     Reflexes   Right brachioradialis: 3+  Left brachioradialis: 3+  Right biceps: 3+  Left biceps: 3+  Right triceps: 3+  Left triceps: 3+  Right patellar: 3+  Left patellar: 3+  Right achilles: 3+  Left achilles: 3+    GAIT AND COORDINATION     Gait  Gait: normal      Significant Labs:   Recent Lab Results         01/18/25  1021   01/18/25  0740   01/18/25  0731        Albumin   3.7         ALP   116         ALT   9         Anion Gap   8         AST   9         Baso #   0.02         Basophil %   0.1         BILIRUBIN TOTAL   0.4  Comment: For infants and newborns, interpretation of results should be based  on gestational age, weight and in agreement with clinical  observations.    Premature Infant recommended reference ranges:  Up to 24 hours.............<8.0 mg/dL  Up to 48 hours............<12.0 mg/dL  3-5 days..................<15.0 mg/dL  6-29 days.................<15.0 mg/dL           BUN   17         Calcium   9.1         Chloride   106         CO2   26         Creatinine   0.7         Differential Method   Automated         eGFR   >60.0         Eos #   0.0         Eos %   0.1         Glucose   150         Gran # (ANC)   17.0         Gran %   89.1         Hematocrit   39.3         Hemoglobin   12.6         Immature Grans (Abs)   0.12  Comment: Mild elevation in immature granulocytes is non specific and   can be seen in a variety of  conditions including stress response,   acute inflammation, trauma and pregnancy. Correlation with other   laboratory and clinical findings is essential.           Immature Granulocytes   0.6         Lymph #   1.1         Lymph %   5.7         Magnesium    2.0         MCH   29.1         MCHC   32.1         MCV   91         Mono #   0.8         Mono %   4.4         MPV   10.0         nRBC   0         Platelet Count   386         POCT Glucose 124     151       Potassium   3.6         PROTEIN TOTAL   6.6         RBC   4.33         RDW   13.1         Sodium   140         Vitamin D   14  Comment: Vitamin D deficiency.........<10 ng/mL                              Vitamin D insufficiency......10-29 ng/mL       Vitamin D sufficiency........> or equal to 30 ng/mL  Vitamin D toxicity............>100 ng/mL           WBC   19.11                 Significant Imaging: I have reviewed and interpreted all pertinent imaging results/findings within the past 24 hours. With Dr. Becerra    MRI brain 01/15/2025:  FINDINGS:  No abnormal diffusion restriction to suggest an acute infarct, and no abnormal GRE signal intensity to suggest an intracranial bleed.  There is no hydrocephalus, herniation or midline shift, and the basal/suprasellar cisterns are patent.  No acute osseous abnormality is identified. There is no cerebral/cerebellar atrophy for age.     The brain has normal ventricles with symmetrical sulci, unremarkable internal structure, and near normal signal intensity throughout.  No focal abnormal contrast enhancement is seen.  The T2 FLAIR hyperintense foci seen in the right mesial temporal lobe on the MRI from 10/20/2024, which resolved on the MRI of 11/19/2024 are not seen on this exam.  No new additional sites of T2 FLAIR signal abnormality.     The pituitary gland and infundibulum is normal in size without abnormal signal pattern. The craniocervical junction is unremarkable. The temporal bones, internal and external meati, and  semicircular canals appear normal. The orbital contents are normal.  There is fluid partially opacifying the right-sided mastoid air cells.  The left-sided mastoid air cells and paranasal sinuses are essentially clear.     Impression:     1.  No acute intracranial process.     2.  Fluid in the right-sided mastoid air cells, consider correlation for mastoiditis.        MRI thoracic spine 01/16/2025:  Impression:  Lower cervical cord too much of the thoracic cord, with mild enhancement consistent with an active lesion.           Detrimental interval development of diffuse cord signal alteration involving the majority of the visualized portion of the lower cervical and thoracic spinal cord associated with mild postcontrast enhancement.  The findings are consistent with active demyelinating process/transverse myelitis.     Bilateral L5 spondylolysis and associated spondylolisthesis at L5-S1.  See above details.     MRI lumbar spine 01/16/2025:  Impression:     Detrimental interval development of diffuse cord signal alteration involving the majority of the visualized portion of the lower cervical and thoracic spinal cord associated with mild postcontrast enhancement.  The findings are consistent with active demyelinating process/transverse myelitis.     Bilateral L5 spondylolysis and associated spondylolisthesis at L5-S1.  See above details.      Assessment and Plan:     Myelin oligodendrocyte glycoprotein antibody disorder (MOGAD)  Recent diagnosis MOGAD, presents with subacute onset of urinary dysfunction, lower extremity painful paresthesias in a sacral distribution, diffuse hyper Niya and hyperreflexia.  Imaging studies demonstrate longitudinally extensive spinal lesion extending from the lower cervical through much of the thoracic spinal cord.  Mild residual enhancement consistent with an active lesion.     Plan to complete 5 days IV Solu-Medrol, 1000 mg daily (today day 3)  -monitor sugars for hyperglycemia  -for  symptomatic treatment, will start gabapentin 300 mg b.i.d., titrate as tolerated to effective dose.  Discussed potential side effects including drowsiness.  -baclofen 5 mg 3 times daily, titrate as needed for muscle spasms and hypertonicity  - physical therapy assessment, relief of spasticity   - her urinary issues are really most consistent with an acute urinary retention; she has to strain in order to void, with frequent, relatively low volume urinations.  Given the complexity of managing urinary retention acutely in a woman will request urology consult for assistance      Active Diagnoses:    Diagnosis Date Noted POA    PRINCIPAL PROBLEM:  Myelin oligodendrocyte glycoprotein antibody disorder (MOGAD) [G37.81] 01/15/2025 Yes    Morbid obesity [E66.01] 11/19/2024 Yes    Optic neuritis [H46.9] 10/20/2024 Yes      Problems Resolved During this Admission:       VTE Risk Mitigation (From admission, onward)           Ordered     enoxaparin injection 40 mg  Daily         01/15/25 1852     IP VTE HIGH RISK PATIENT  Once         01/15/25 1852     Place sequential compression device  Until discontinued         01/15/25 1852                  Myelin oligodendrocyte glycoprotein antibody disorder (MOGAD)    Recommendations:   -as above  -check vitamin-D level  - Start daily vitamin-D    -patient with 90% improvement of extremity weakness and numbness.  He used to have numbness and cold feeling to left foot.  Total resolution of urinary incontinence, urinary dysfunction, lower extremity painful paresthesias in a sacral distribution, diffuse hyper Niya and hyperreflexia.     -cleared for discharge after dose IV Solu-Medrol tomorrow.  - discharged home on prednisone taper.  Start with prednisone 60 mg by mouth daily for 2 weeks then prednisone 50 mg by mouth daily for 2 weeks, then prednisone 40 mg by mouth daily for 2 weeks, then prednisone 30 mg by mouth for 2 weeks, and prednisone 20 mg daily  - daily vitamin D   -patient to  follow-up with Dr. Emily Stewart at [facility] on [facility] for continuous MOGAD management        Tanya Remy NP  Neurology  [facility]

## 2025-01-18 NOTE — PROGRESS NOTES
[facility] Medicine  Progress Note    Patient Name: Dalia Collado  MRN: [id number]  Patient Class: IP- Inpatient   Admission Date: 1/15/2025  Length of Stay: 1 days  Attending Physician: Aliya Jacobs MD  Primary Care Provider: Chelsea, Primary Doctor        Subjective     Principal Problem:Myelin oligodendrocyte glycoprotein antibody disorder (MOGAD)        HPI:  34 year old pt getting admitted with possible complications from MOGAD  Pt was diagnosed with bilateral optic neuritis and subsequently MOGAD on Nov 2024  Pt was symptoms free since then until 1 week ago    Started having difficulty in urination and urge to urinate again and again 1 week ago  This was followed by numbness/tingling/cold sensation on legs  Pt tried to contact her Neurologist and seems like Neuro MD left the practice and moved out of state  She came to ER and got admitted         Overview/Hospital Course:  This is a 34-year-old lady previous diagnosis of myelin oligodendrocyte glycoprotein antibody disorder with recent presentation optic neuritis given pulse steroids in November.  She did well for several weeks until 1 week ago when she is mainly complaining of urinary retention, spasticity and numbness and tingling of her bilateral legs and feet.  She is admitted for the same.  Neurologist is consulted inpatient is again initiated on pulse dose steroids.    Interval History:  Patient seen and examined this morning.  She is resting comfortably in no acute distress.  Postvoid residuals approximating 200 cc.  Her only current complaint is the residual numbness and tingling in her feet.  She reports that the gabapentin is enough to resolve the symptoms in her upper extremities.  She is hemodynamically stable.    Review of Systems   Constitutional:  Negative for activity change and appetite change.   HENT:  Negative for congestion and dental problem.    Eyes:  Negative for discharge and itching.   Respiratory:   Negative for shortness of breath.    Cardiovascular:  Negative for chest pain.   Gastrointestinal:  Negative for abdominal distention and abdominal pain.   Endocrine: Negative for cold intolerance.   Genitourinary:  Negative for difficulty urinating and dysuria.   Musculoskeletal:  Negative for arthralgias and back pain.   Skin:  Negative for color change.   Neurological:  Positive for weakness and numbness. Negative for dizziness and facial asymmetry.   Hematological:  Negative for adenopathy.   Psychiatric/Behavioral:  Negative for agitation and behavioral problems.      Objective:     Vital Signs (Most Recent):  Temp: 98.2 °F (36.8 °C) (01/18/25 1058)  Pulse: 79 (01/18/25 1058)  Resp: 15 (01/18/25 1058)  BP: (!) 143/90 (01/18/25 1058)  SpO2: 100 % (01/18/25 1058) Vital Signs (24h Range):  Temp:  [97.3 °F (36.3 °C)-98.2 °F (36.8 °C)] 98.2 °F (36.8 °C)  Pulse:  [] 79  Resp:  [15-18] 15  SpO2:  [98 %-100 %] 100 %  BP: (120-146)/(77-99) 143/90     Weight: 110.8 kg (244 lb 4.8 oz)  Body mass index is 43.28 kg/m².    Intake/Output Summary (Last 24 hours) at 1/18/2025 1202  Last data filed at 1/18/2025 1120  Gross per 24 hour   Intake 960 ml   Output 500 ml   Net 460 ml         Physical Exam  Vitals and nursing note reviewed.   Constitutional:       General: She is not in acute distress.  HENT:      Head: Atraumatic.      Right Ear: External ear normal.      Left Ear: External ear normal.      Nose: Nose normal.      Mouth/Throat:      Mouth: Mucous membranes are moist.   Cardiovascular:      Rate and Rhythm: Normal rate and regular rhythm.   Pulmonary:      Effort: Pulmonary effort is normal. No respiratory distress.      Breath sounds: No wheezing or rales.   Musculoskeletal:         General: Normal range of motion.      Cervical back: Normal range of motion.      Right lower leg: No edema.      Left lower leg: No edema.   Skin:     General: Skin is dry.   Neurological:      General: No focal deficit present.       Mental Status: She is alert and oriented to person, place, and time.      Cranial Nerves: No cranial nerve deficit.      Motor: No weakness.   Psychiatric:         Mood and Affect: Mood normal.         Behavior: Behavior normal.             Significant Labs: All pertinent labs within the past 24 hours have been reviewed.  BMP:   Recent Labs   Lab 01/18/25  0740   *      K 3.6      CO2 26   BUN 17   CREATININE 0.7   CALCIUM 9.1   MG 2.0     CBC:   Recent Labs   Lab 01/17/25  0401 01/18/25  0740   WBC 16.19* 19.11*   HGB 13.4 12.6   HCT 42.0 39.3    386       Significant Imaging: I have reviewed all pertinent imaging results/findings within the past 24 hours.    Assessment and Plan     * Myelin oligodendrocyte glycoprotein antibody disorder (MOGAD)  Getting admitted with possible complications from MOGAD  MRI spine showing detrimental interval development of diffuse cord signal alteration involving the majority of the visualized portion of the lower cervical and thoracic spinal cord associated with mild postcontrast enhancement.  The findings are consistent with active demyelinating process/transverse myelitis.   Neurology following continuing high dose steroids, 5 day course  Did well with physical therapy  Full strength throughout      Morbid obesity  Body mass index is 38.97 kg/m². Morbid obesity complicates all aspects of disease management from diagnostic modalities to treatment.     Optic neuritis  H/o aware         VTE Risk Mitigation (From admission, onward)           Ordered     enoxaparin injection 40 mg  Daily         01/15/25 1852     IP VTE HIGH RISK PATIENT  Once         01/15/25 1852     Place sequential compression device  Until discontinued         01/15/25 1852                    Discharge Planning   JORDEN: 1/19/2025     Code Status: Full Code   Medical Readiness for Discharge Date:   Discharge Plan A: Home with family   Discharge Delays: (!) Procedure Scheduling (IR, OR,  Labs, Echo, Cath, Echo, EEG)                    Aliya Jacobs MD  Department of Hospital Medicine   [facility]

## 2025-01-18 NOTE — SUBJECTIVE & OBJECTIVE
Interval History:  Patient seen and examined this morning.  She is resting comfortably in no acute distress.  Postvoid residuals approximating 200 cc.  Her only current complaint is the residual numbness and tingling in her feet.  She reports that the gabapentin is enough to resolve the symptoms in her upper extremities.  She is hemodynamically stable.    Review of Systems   Constitutional:  Negative for activity change and appetite change.   HENT:  Negative for congestion and dental problem.    Eyes:  Negative for discharge and itching.   Respiratory:  Negative for shortness of breath.    Cardiovascular:  Negative for chest pain.   Gastrointestinal:  Negative for abdominal distention and abdominal pain.   Endocrine: Negative for cold intolerance.   Genitourinary:  Negative for difficulty urinating and dysuria.   Musculoskeletal:  Negative for arthralgias and back pain.   Skin:  Negative for color change.   Neurological:  Positive for weakness and numbness. Negative for dizziness and facial asymmetry.   Hematological:  Negative for adenopathy.   Psychiatric/Behavioral:  Negative for agitation and behavioral problems.      Objective:     Vital Signs (Most Recent):  Temp: 98.2 °F (36.8 °C) (01/18/25 1058)  Pulse: 79 (01/18/25 1058)  Resp: 15 (01/18/25 1058)  BP: (!) 143/90 (01/18/25 1058)  SpO2: 100 % (01/18/25 1058) Vital Signs (24h Range):  Temp:  [97.3 °F (36.3 °C)-98.2 °F (36.8 °C)] 98.2 °F (36.8 °C)  Pulse:  [] 79  Resp:  [15-18] 15  SpO2:  [98 %-100 %] 100 %  BP: (120-146)/(77-99) 143/90     Weight: 110.8 kg (244 lb 4.8 oz)  Body mass index is 43.28 kg/m².    Intake/Output Summary (Last 24 hours) at 1/18/2025 1202  Last data filed at 1/18/2025 1120  Gross per 24 hour   Intake 960 ml   Output 500 ml   Net 460 ml         Physical Exam  Vitals and nursing note reviewed.   Constitutional:       General: She is not in acute distress.  HENT:      Head: Atraumatic.      Right Ear: External ear normal.      Left  Ear: External ear normal.      Nose: Nose normal.      Mouth/Throat:      Mouth: Mucous membranes are moist.   Cardiovascular:      Rate and Rhythm: Normal rate and regular rhythm.   Pulmonary:      Effort: Pulmonary effort is normal. No respiratory distress.      Breath sounds: No wheezing or rales.   Musculoskeletal:         General: Normal range of motion.      Cervical back: Normal range of motion.      Right lower leg: No edema.      Left lower leg: No edema.   Skin:     General: Skin is dry.   Neurological:      General: No focal deficit present.      Mental Status: She is alert and oriented to person, place, and time.      Cranial Nerves: No cranial nerve deficit.      Motor: No weakness.   Psychiatric:         Mood and Affect: Mood normal.         Behavior: Behavior normal.             Significant Labs: All pertinent labs within the past 24 hours have been reviewed.  BMP:   Recent Labs   Lab 01/18/25  0740   *      K 3.6      CO2 26   BUN 17   CREATININE 0.7   CALCIUM 9.1   MG 2.0     CBC:   Recent Labs   Lab 01/17/25  0401 01/18/25  0740   WBC 16.19* 19.11*   HGB 13.4 12.6   HCT 42.0 39.3    386       Significant Imaging: I have reviewed all pertinent imaging results/findings within the past 24 hours.

## 2025-01-19 VITALS
HEART RATE: 60 BPM | SYSTOLIC BLOOD PRESSURE: 141 MMHG | BODY MASS INDEX: 43.29 KG/M2 | DIASTOLIC BLOOD PRESSURE: 94 MMHG | RESPIRATION RATE: 18 BRPM | OXYGEN SATURATION: 97 % | HEIGHT: 63 IN | TEMPERATURE: 98 F | WEIGHT: 244.31 LBS

## 2025-01-19 PROBLEM — H46.9 OPTIC NEURITIS: Status: RESOLVED | Noted: 2024-10-20 | Resolved: 2025-01-19

## 2025-01-19 LAB
ALBUMIN SERPL BCP-MCNC: 3.3 G/DL (ref 3.5–5.2)
ALP SERPL-CCNC: 99 U/L (ref 55–135)
ALT SERPL W/O P-5'-P-CCNC: 8 U/L (ref 10–44)
ANION GAP SERPL CALC-SCNC: 6 MMOL/L (ref 8–16)
AST SERPL-CCNC: 7 U/L (ref 10–40)
BASOPHILS # BLD AUTO: 0.01 K/UL (ref 0–0.2)
BASOPHILS NFR BLD: 0.1 % (ref 0–1.9)
BILIRUB SERPL-MCNC: 0.3 MG/DL (ref 0.1–1)
BUN SERPL-MCNC: 16 MG/DL (ref 6–20)
CALCIUM SERPL-MCNC: 8.7 MG/DL (ref 8.7–10.5)
CHLORIDE SERPL-SCNC: 106 MMOL/L (ref 95–110)
CO2 SERPL-SCNC: 28 MMOL/L (ref 23–29)
CREAT SERPL-MCNC: 0.6 MG/DL (ref 0.5–1.4)
DIFFERENTIAL METHOD BLD: ABNORMAL
EOSINOPHIL # BLD AUTO: 0 K/UL (ref 0–0.5)
EOSINOPHIL NFR BLD: 0 % (ref 0–8)
ERYTHROCYTE [DISTWIDTH] IN BLOOD BY AUTOMATED COUNT: 13.2 % (ref 11.5–14.5)
EST. GFR  (NO RACE VARIABLE): >60 ML/MIN/1.73 M^2
GLUCOSE SERPL-MCNC: 145 MG/DL (ref 70–110)
HCT VFR BLD AUTO: 35.3 % (ref 37–48.5)
HGB BLD-MCNC: 11.9 G/DL (ref 12–16)
IMM GRANULOCYTES # BLD AUTO: 0.16 K/UL (ref 0–0.04)
IMM GRANULOCYTES NFR BLD AUTO: 1.3 % (ref 0–0.5)
LYMPHOCYTES # BLD AUTO: 1 K/UL (ref 1–4.8)
LYMPHOCYTES NFR BLD: 7.6 % (ref 18–48)
MAGNESIUM SERPL-MCNC: 2.1 MG/DL (ref 1.6–2.6)
MCH RBC QN AUTO: 29.8 PG (ref 27–31)
MCHC RBC AUTO-ENTMCNC: 33.7 G/DL (ref 32–36)
MCV RBC AUTO: 89 FL (ref 82–98)
MONOCYTES # BLD AUTO: 0.8 K/UL (ref 0.3–1)
MONOCYTES NFR BLD: 6 % (ref 4–15)
NEUTROPHILS # BLD AUTO: 10.8 K/UL (ref 1.8–7.7)
NEUTROPHILS NFR BLD: 85 % (ref 38–73)
NRBC BLD-RTO: 0 /100 WBC
PLATELET # BLD AUTO: 315 K/UL (ref 150–450)
PMV BLD AUTO: 9.7 FL (ref 9.2–12.9)
POCT GLUCOSE: 134 MG/DL (ref 70–110)
POTASSIUM SERPL-SCNC: 4 MMOL/L (ref 3.5–5.1)
PROT SERPL-MCNC: 5.7 G/DL (ref 6–8.4)
RBC # BLD AUTO: 3.99 M/UL (ref 4–5.4)
SODIUM SERPL-SCNC: 140 MMOL/L (ref 136–145)
WBC # BLD AUTO: 12.68 K/UL (ref 3.9–12.7)

## 2025-01-19 PROCEDURE — 25000003 PHARM REV CODE 250: Performed by: PSYCHIATRY & NEUROLOGY

## 2025-01-19 PROCEDURE — 80053 COMPREHEN METABOLIC PANEL: CPT | Performed by: INTERNAL MEDICINE

## 2025-01-19 PROCEDURE — 63600175 PHARM REV CODE 636 W HCPCS: Mod: JZ,TB | Performed by: PSYCHIATRY & NEUROLOGY

## 2025-01-19 PROCEDURE — 99222 1ST HOSP IP/OBS MODERATE 55: CPT | Mod: FS,,, | Performed by: PSYCHIATRY & NEUROLOGY

## 2025-01-19 PROCEDURE — 25000003 PHARM REV CODE 250: Performed by: INTERNAL MEDICINE

## 2025-01-19 PROCEDURE — 85025 COMPLETE CBC W/AUTO DIFF WBC: CPT | Performed by: INTERNAL MEDICINE

## 2025-01-19 PROCEDURE — 83735 ASSAY OF MAGNESIUM: CPT | Performed by: INTERNAL MEDICINE

## 2025-01-19 PROCEDURE — 36415 COLL VENOUS BLD VENIPUNCTURE: CPT | Performed by: INTERNAL MEDICINE

## 2025-01-19 RX ORDER — CHOLECALCIFEROL (VITAMIN D3) 50 MCG
1 TABLET ORAL DAILY
Qty: 90 EACH | Refills: 0 | Status: SHIPPED | OUTPATIENT
Start: 2025-01-19

## 2025-01-19 RX ORDER — GABAPENTIN 300 MG/1
300 CAPSULE ORAL 3 TIMES DAILY
Qty: 180 CAPSULE | Refills: 0 | Status: SHIPPED | OUTPATIENT
Start: 2025-01-19 | End: 2026-01-19

## 2025-01-19 RX ORDER — PREDNISONE 20 MG/1
TABLET ORAL
Qty: 216 TABLET | Refills: 0 | Status: SHIPPED | OUTPATIENT
Start: 2025-01-19 | End: 2025-06-14

## 2025-01-19 RX ORDER — BACLOFEN 10 MG/1
5 TABLET ORAL 3 TIMES DAILY
Qty: 45 TABLET | Refills: 0 | Status: SHIPPED | OUTPATIENT
Start: 2025-01-19 | End: 2026-01-19

## 2025-01-19 RX ADMIN — SODIUM CHLORIDE 1000 MG: 9 INJECTION, SOLUTION INTRAVENOUS at 02:01

## 2025-01-19 RX ADMIN — FAMOTIDINE 20 MG: 20 TABLET, FILM COATED ORAL at 08:01

## 2025-01-19 RX ADMIN — GABAPENTIN 300 MG: 300 CAPSULE ORAL at 08:01

## 2025-01-19 NOTE — ASSESSMENT & PLAN NOTE
admitted with possible complications from MOGAD  MRI spine showing detrimental interval development of diffuse cord signal alteration involving the majority of the visualized portion of the lower cervical and thoracic spinal cord associated with mild postcontrast enhancement.  The findings are consistent with active demyelinating process/transverse myelitis.   Neurology followed, completed 5 day course  Sending with steroid taper, gabapentin, baclofen and vitamin D

## 2025-01-19 NOTE — PLAN OF CARE
01/19/25 0827   Final Note   Assessment Type Final Discharge Note   Anticipated Discharge Disposition Home   Post-Acute Status   Discharge Delays None known at this time         Chart and discharge orders reviewed.  Patient discharged home with no further case management needs.    Patient cleared for discharge from case management standpoint.

## 2025-01-19 NOTE — DISCHARGE SUMMARY
[facility]  Discharge Summary      Patient Name: Dalia Collado  MRN: [id number]  HAILEY: [id number]  Patient Class: IP- Inpatient  Admission Date: 1/15/2025  Hospital Length of Stay: 2 days  Discharge Date and Time:  01/19/2025 12:10 PM  Attending Physician: Aliya Jacobs MD   Discharging Provider: Aliya Jacobs MD  Primary Care Provider: Chelsea, Primary Doctor    Primary Care Team: Networked reference to record PCT     HPI:   34 year old pt getting admitted with possible complications from MOGAD  Pt was diagnosed with bilateral optic neuritis and subsequently MOGAD on Nov 2024  Pt was symptoms free since then until 1 week ago    Started having difficulty in urination and urge to urinate again and again 1 week ago  This was followed by numbness/tingling/cold sensation on legs  Pt tried to contact her Neurologist and seems like Neuro MD left the practice and moved out of state  She came to ER and got admitted         * No surgery found *      Hospital Course:   This is a 34-year-old lady previous diagnosis of myelin oligodendrocyte glycoprotein antibody disorder with recent presentation optic neuritis given pulse steroids in November.  She did well for several weeks until 1 week ago when she is mainly complaining of urinary retention, spasticity and numbness and tingling of her bilateral legs and feet.  She is admitted for the same.  Neurologist is consulted inpatient is again initiated on pulse dose steroids.  She was given 5 days of 1000 mg she has continued to be seen by Neurology.  She was also seen by Physical therapy.  Her symptoms have largely improved.  She does have some residual numbness and tingling in her feet.  She is initiated on and gabapentin for symptomatic relief.  Also sent with a prescription for vitamin-D.  She will see Dr. Stewart [facility] and follow up.  She was also sent for ambulatory referral for Urology urinary retention although she  never retained more than 250 cc in his able to void.  Directions were given to the patient regarding her steroid taper and these were sent to her pharmacy.  She is seen and examined prior to discharge in stable condition to do so.     Goals of Care Treatment Preferences:  Code Status: Full Code      SDOH Screening:  The patient was screened for utility difficulties, food insecurity, transport difficulties, housing insecurity, and interpersonal safety and there were no concerns identified this admission.     Consults:   Consults (From admission, onward)          Status Ordering Provider     Inpatient consult to Neurology  Once        Provider:  Jono Chun, JESSICA Saxena            * Myelin oligodendrocyte glycoprotein antibody disorder (MOGAD)  admitted with possible complications from MOGAD  MRI spine showing detrimental interval development of diffuse cord signal alteration involving the majority of the visualized portion of the lower cervical and thoracic spinal cord associated with mild postcontrast enhancement.  The findings are consistent with active demyelinating process/transverse myelitis.   Neurology followed, completed 5 day course  Sending with steroid taper, gabapentin, baclofen and vitamin D      Morbid obesity  Body mass index is 38.97 kg/m². Morbid obesity complicates all aspects of disease management from diagnostic modalities to treatment.       Final Active Diagnoses:    Diagnosis Date Noted POA    PRINCIPAL PROBLEM:  Myelin oligodendrocyte glycoprotein antibody disorder (MOGAD) [G37.81] 01/15/2025 Yes    Morbid obesity [E66.01] 11/19/2024 Yes      Problems Resolved During this Admission:    Diagnosis Date Noted Date Resolved POA    Optic neuritis [H46.9] 10/20/2024 01/19/2025 Yes       Discharged Condition: good    Disposition: Home or Self Care    Follow Up:   Follow-up Information       Emily Stewart MD Follow up in 2 week(s).    Specialty: Neurology  Contact  information:  [id number]  [facility] [id number]  [phone]                           Patient Instructions:      Ambulatory referral/consult to Urology   Standing Status: Future   Referral Priority: Routine Referral Type: Consultation   Referral Reason: Specialty Services Required   Requested Specialty: Urology   Number of Visits Requested: 1       Significant Diagnostic Studies: Labs: BMP:   Recent Labs   Lab 01/18/25  0740 01/19/25  0501   * 145*    140   K 3.6 4.0    106   CO2 26 28   BUN 17 16   CREATININE 0.7 0.6   CALCIUM 9.1 8.7   MG 2.0 2.1    and CBC   Recent Labs   Lab 01/18/25  0740 01/19/25  0501   WBC 19.11* 12.68   HGB 12.6 11.9*   HCT 39.3 35.3*    315       Pending Diagnostic Studies:       None           Medications:  Reconciled Home Medications:      Medication List        START taking these medications      baclofen 10 MG tablet  Commonly known as: LIORESAL  Take 0.5 tablets (5 mg total) by mouth 3 (three) times daily.     cholecalciferol (vitamin D3) 50 mcg (2,000 unit) Tab  Commonly known as: VITAMIN D3  Take 1 tablet (2,000 Units total) by mouth once daily.     gabapentin 300 MG capsule  Commonly known as: NEURONTIN  Take 1 capsule (300 mg total) by mouth 3 (three) times daily.     predniSONE 20 MG tablet  Commonly known as: DELTASONE  Take 3 tablets (60 mg total) by mouth once daily for 14 days, THEN 2.5 tablets (50 mg total) once daily for 14 days, THEN 2 tablets (40 mg total) once daily for 14 days, THEN 1.5 tablets (30 mg total) once daily for 14 days, THEN 1 tablet (20 mg total) once daily.  Start taking on: January 19, 2025              Indwelling Lines/Drains at time of discharge:   Lines/Drains/Airways       None                   Time spent on the discharge of patient: 35 minutes         Aliya Jacobs MD  Department of Hospital Medicine  [facility]

## 2025-01-19 NOTE — DISCHARGE INSTRUCTIONS
You will be on steroid taper as such:  Prednisone 60mg/day for two weeks THEN 50mg/day for two weeks THEN 40mg/day for two weeks THEN 30mg/day for two weeks THEN 20mg a day until you are seen at [facility] by neurologist.    Baclofen and gabapentin for symptoms as we discussed    Also sending you with Vitamin D supplementation

## 2025-01-20 LAB
OHS QRS DURATION: 108 MS
OHS QTC CALCULATION: 487 MS